# Patient Record
Sex: MALE | Race: BLACK OR AFRICAN AMERICAN | NOT HISPANIC OR LATINO | Employment: OTHER | URBAN - METROPOLITAN AREA
[De-identification: names, ages, dates, MRNs, and addresses within clinical notes are randomized per-mention and may not be internally consistent; named-entity substitution may affect disease eponyms.]

---

## 2017-10-02 ENCOUNTER — OFFICE VISIT (OUTPATIENT)
Dept: FAMILY MEDICINE | Facility: CLINIC | Age: 62
End: 2017-10-02
Payer: COMMERCIAL

## 2017-10-02 ENCOUNTER — LAB VISIT (OUTPATIENT)
Dept: LAB | Facility: HOSPITAL | Age: 62
End: 2017-10-02
Attending: FAMILY MEDICINE
Payer: COMMERCIAL

## 2017-10-02 VITALS
RESPIRATION RATE: 16 BRPM | HEIGHT: 74 IN | SYSTOLIC BLOOD PRESSURE: 190 MMHG | BODY MASS INDEX: 31.72 KG/M2 | DIASTOLIC BLOOD PRESSURE: 100 MMHG | WEIGHT: 247.13 LBS | OXYGEN SATURATION: 100 % | TEMPERATURE: 98 F

## 2017-10-02 DIAGNOSIS — R97.20 ELEVATED PSA: Primary | ICD-10-CM

## 2017-10-02 DIAGNOSIS — I10 HYPERTENSION, UNSPECIFIED TYPE: ICD-10-CM

## 2017-10-02 DIAGNOSIS — Z00.00 ANNUAL PHYSICAL EXAM: Primary | ICD-10-CM

## 2017-10-02 DIAGNOSIS — Z00.00 ANNUAL PHYSICAL EXAM: ICD-10-CM

## 2017-10-02 LAB
ALBUMIN SERPL BCP-MCNC: 3.8 G/DL
ALP SERPL-CCNC: 103 U/L
ALT SERPL W/O P-5'-P-CCNC: 23 U/L
ANION GAP SERPL CALC-SCNC: 11 MMOL/L
AST SERPL-CCNC: 16 U/L
BASOPHILS # BLD AUTO: 0.02 K/UL
BASOPHILS NFR BLD: 0.3 %
BILIRUB SERPL-MCNC: 0.4 MG/DL
BILIRUB UR QL STRIP: NEGATIVE
BUN SERPL-MCNC: 12 MG/DL
CALCIUM SERPL-MCNC: 10.3 MG/DL
CHLORIDE SERPL-SCNC: 101 MMOL/L
CLARITY UR REFRACT.AUTO: CLEAR
CO2 SERPL-SCNC: 28 MMOL/L
COLOR UR AUTO: YELLOW
COMPLEXED PSA SERPL-MCNC: 7.6 NG/ML
CREAT SERPL-MCNC: 1 MG/DL
DIFFERENTIAL METHOD: ABNORMAL
EOSINOPHIL # BLD AUTO: 0.1 K/UL
EOSINOPHIL NFR BLD: 2.1 %
ERYTHROCYTE [DISTWIDTH] IN BLOOD BY AUTOMATED COUNT: 16.3 %
EST. GFR  (AFRICAN AMERICAN): >60 ML/MIN/1.73 M^2
EST. GFR  (NON AFRICAN AMERICAN): >60 ML/MIN/1.73 M^2
GLUCOSE SERPL-MCNC: 91 MG/DL
GLUCOSE UR QL STRIP: NEGATIVE
HCT VFR BLD AUTO: 41.3 %
HGB BLD-MCNC: 13.4 G/DL
HGB UR QL STRIP: NEGATIVE
KETONES UR QL STRIP: NEGATIVE
LEUKOCYTE ESTERASE UR QL STRIP: NEGATIVE
LYMPHOCYTES # BLD AUTO: 2.1 K/UL
LYMPHOCYTES NFR BLD: 33.2 %
MCH RBC QN AUTO: 26.9 PG
MCHC RBC AUTO-ENTMCNC: 32.4 G/DL
MCV RBC AUTO: 83 FL
MICROSCOPIC COMMENT: NORMAL
MONOCYTES # BLD AUTO: 0.5 K/UL
MONOCYTES NFR BLD: 7.4 %
NEUTROPHILS # BLD AUTO: 3.5 K/UL
NEUTROPHILS NFR BLD: 56.8 %
NITRITE UR QL STRIP: NEGATIVE
PH UR STRIP: 5 [PH] (ref 5–8)
PLATELET # BLD AUTO: 279 K/UL
PMV BLD AUTO: 11.8 FL
POTASSIUM SERPL-SCNC: 3.7 MMOL/L
PROT SERPL-MCNC: 8.6 G/DL
PROT UR QL STRIP: NEGATIVE
RBC # BLD AUTO: 4.99 M/UL
RBC #/AREA URNS AUTO: 0 /HPF (ref 0–4)
SODIUM SERPL-SCNC: 140 MMOL/L
SP GR UR STRIP: 1.02 (ref 1–1.03)
SQUAMOUS #/AREA URNS AUTO: 0 /HPF
TSH SERPL DL<=0.005 MIU/L-ACNC: 2.67 UIU/ML
URN SPEC COLLECT METH UR: NORMAL
UROBILINOGEN UR STRIP-ACNC: NEGATIVE EU/DL
WBC # BLD AUTO: 6.18 K/UL
WBC #/AREA URNS AUTO: 1 /HPF (ref 0–5)

## 2017-10-02 PROCEDURE — 81001 URINALYSIS AUTO W/SCOPE: CPT

## 2017-10-02 PROCEDURE — 99999 PR PBB SHADOW E&M-NEW PATIENT-LVL III: CPT | Mod: PBBFAC,,, | Performed by: FAMILY MEDICINE

## 2017-10-02 PROCEDURE — 84153 ASSAY OF PSA TOTAL: CPT

## 2017-10-02 PROCEDURE — 99386 PREV VISIT NEW AGE 40-64: CPT | Mod: S$GLB,,, | Performed by: FAMILY MEDICINE

## 2017-10-02 PROCEDURE — 80053 COMPREHEN METABOLIC PANEL: CPT

## 2017-10-02 PROCEDURE — 84443 ASSAY THYROID STIM HORMONE: CPT

## 2017-10-02 PROCEDURE — 36415 COLL VENOUS BLD VENIPUNCTURE: CPT | Mod: PO

## 2017-10-02 PROCEDURE — 85025 COMPLETE CBC W/AUTO DIFF WBC: CPT

## 2017-10-02 RX ORDER — LISINOPRIL AND HYDROCHLOROTHIAZIDE 20; 25 MG/1; MG/1
1 TABLET ORAL DAILY
Qty: 30 TABLET | Refills: 0 | Status: SHIPPED | OUTPATIENT
Start: 2017-10-02 | End: 2017-10-29 | Stop reason: SDUPTHER

## 2017-10-02 NOTE — PROGRESS NOTES
Subjective:       Patient ID: Demario Live is a 61 y.o. male.    Chief Complaint: Hypertension      HPI  Mr. Live presents to clinic today for complaints of high blood pressure.   He state he has been off medication for 5 years.   He states he was on Zestoretic at one time, but got off.   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   His last colonoscopy was 12  Years ago.   He states the colonoscopy was within normal range.     He does not exercise currently.   He does not smoke.   He drinks on occasion.   He states he has been eating a lot of baked food.     Review of Systems   Constitutional: Negative for fever.   Respiratory: Negative for cough and shortness of breath.    Cardiovascular: Negative for chest pain.   Gastrointestinal: Negative for abdominal pain, diarrhea and vomiting.   Genitourinary: Negative for dysuria and hematuria.   Neurological: Negative for dizziness and headaches.       Family History   Problem Relation Age of Onset    Stroke Mother     Heart disease Father      Social History     Social History    Marital status:      Spouse name: N/A    Number of children: N/A    Years of education: N/A     Occupational History    Not on file.     Social History Main Topics    Smoking status: Never Smoker    Smokeless tobacco: Never Used    Alcohol use Yes      Comment: social     Drug use: No    Sexual activity: Yes     Partners: Female     Other Topics Concern    Not on file     Social History Narrative    No narrative on file     .soc  There is no problem list on file for this patient.        Objective:     Physical Exam   Constitutional: He is oriented to person, place, and time. He appears well-developed and well-nourished. No distress.   HENT:   Head: Normocephalic and atraumatic.   Right Ear: External ear normal.   Left Ear: External ear normal.   Mouth/Throat: Oropharynx is clear and moist. No oropharyngeal exudate.   Eyes: EOM are  normal. Right eye exhibits no discharge. Left eye exhibits no discharge.   Cardiovascular: Normal rate and regular rhythm.    Pulmonary/Chest: Effort normal and breath sounds normal. No respiratory distress. He has no wheezes.   Abdominal: Soft. Bowel sounds are normal. He exhibits no distension. There is no tenderness. There is no rebound and no guarding.   Musculoskeletal: He exhibits no edema.   Neurological: He is alert and oriented to person, place, and time.   Skin: Skin is warm and dry. He is not diaphoretic. No erythema.   Psychiatric: He has a normal mood and affect.   Vitals reviewed.    Vitals:    10/02/17 1343   BP: (!) 190/100   Resp: 16   Temp: 98.4 °F (36.9 °C)       Assessment/  PLAN     Annual physical exam  -     CBC auto differential; Future; Expected date: 10/02/2017  -     Comprehensive metabolic panel; Future; Expected date: 10/02/2017  -     TSH; Future; Expected date: 10/02/2017  -     Urinalysis  -     PSA, Screening; Future; Expected date: 10/02/2017    Hypertension, unspecified type  -     lisinopril-hydrochlorothiazide (PRINZIDE,ZESTORETIC) 20-25 mg Tab; Take 1 tablet by mouth once daily.  Dispense: 30 tablet; Refill: 0    Other orders  -     Urinalysis Microscopic      Plan as above   rtc 1 month for follow up on htn     Bull Morris MD  [facility]

## 2017-10-02 NOTE — PATIENT INSTRUCTIONS

## 2017-10-02 NOTE — LETTER
October 2, 2017             [facility]  Family Medicine  [address] [place] [address]  Phone: [phone]   October 2, 2017     Patient: Demario Live   YOB: 1955   Date of Visit: 10/2/2017       To Whom it May Concern:    Demario Live was seen in my clinic on 10/2/2017. He may return to work on 10/4/2017.    If you have any questions or concerns, please don't hesitate to call.    Sincerely,         Riki Grimaldo LPN

## 2017-10-05 PROBLEM — I10 HYPERTENSION: Status: ACTIVE | Noted: 2017-10-05

## 2017-10-06 ENCOUNTER — OFFICE VISIT (OUTPATIENT)
Dept: FAMILY MEDICINE | Facility: CLINIC | Age: 62
End: 2017-10-06
Payer: COMMERCIAL

## 2017-10-06 VITALS
TEMPERATURE: 98 F | BODY MASS INDEX: 32.08 KG/M2 | SYSTOLIC BLOOD PRESSURE: 140 MMHG | HEART RATE: 80 BPM | DIASTOLIC BLOOD PRESSURE: 86 MMHG | OXYGEN SATURATION: 99 % | HEIGHT: 73 IN | WEIGHT: 242.06 LBS | RESPIRATION RATE: 16 BRPM

## 2017-10-06 DIAGNOSIS — I10 HYPERTENSION, UNSPECIFIED TYPE: Primary | ICD-10-CM

## 2017-10-06 PROCEDURE — 99999 PR PBB SHADOW E&M-EST. PATIENT-LVL III: CPT | Mod: PBBFAC,,, | Performed by: FAMILY MEDICINE

## 2017-10-06 PROCEDURE — 99214 OFFICE O/P EST MOD 30 MIN: CPT | Mod: S$GLB,,, | Performed by: FAMILY MEDICINE

## 2017-10-06 RX ORDER — AMLODIPINE BESYLATE 5 MG/1
5 TABLET ORAL DAILY
Qty: 30 TABLET | Refills: 0 | Status: SHIPPED | OUTPATIENT
Start: 2017-10-06 | End: 2017-11-01 | Stop reason: SDUPTHER

## 2017-10-06 NOTE — PROGRESS NOTES
Subjective:       Patient ID: Demario Live is a 61 y.o. male.    Chief Complaint: Hypertension      HPI  Mr. Live presents to clinic today for complaints of high blood pressure. 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   He was noted to have 180/100 at the physical.   He states he has been going to [facility] and it is noted be in the 140 range.   He denies any side effects from the medication and states he is feeling great.     Review of Systems   Constitutional: Negative for fever.   Respiratory: Negative for cough and shortness of breath.    Cardiovascular: Negative for chest pain.   Gastrointestinal: Negative for abdominal pain and vomiting.   Neurological: Negative for dizziness and headaches.       Medication List with Changes/Refills   Current Medications    LISINOPRIL-HYDROCHLOROTHIAZIDE (PRINZIDE,ZESTORETIC) 20-25 MG TAB    Take 1 tablet by mouth once daily.       Patient Active Problem List   Diagnosis    Hypertension         Objective:     Physical Exam   Constitutional: He is oriented to person, place, and time. He appears well-developed and well-nourished. No distress.   HENT:   Head: Normocephalic and atraumatic.   Right Ear: External ear normal.   Left Ear: External ear normal.   Eyes: EOM are normal. Right eye exhibits no discharge. Left eye exhibits no discharge.   Cardiovascular: Normal rate and regular rhythm.    Pulmonary/Chest: Effort normal and breath sounds normal. No respiratory distress. He has no wheezes.   Musculoskeletal: He exhibits no edema.   Neurological: He is alert and oriented to person, place, and time.   Skin: Skin is warm and dry. He is not diaphoretic. No erythema.   Psychiatric: He has a normal mood and affect.   Vitals reviewed.    Vitals:    10/06/17 1032   BP: (!) 140/86   Pulse: 80   Resp: 16   Temp: 98.3 °F (36.8 °C)       Assessment/  PLAN     Hypertension, unspecified type  -     amlodipine (NORVASC) 5 MG tablet; Take 1 tablet (5 mg  total) by mouth once daily.  Dispense: 30 tablet; Refill: 0  - continue zestoretic     Plan as above   rtc in a few weeks for follow up on htn     Bull Morris MD  [facility]

## 2017-10-29 DIAGNOSIS — I10 HYPERTENSION, UNSPECIFIED TYPE: ICD-10-CM

## 2017-10-29 RX ORDER — LISINOPRIL AND HYDROCHLOROTHIAZIDE 20; 25 MG/1; MG/1
1 TABLET ORAL DAILY
Qty: 30 TABLET | Refills: 0 | Status: SHIPPED | OUTPATIENT
Start: 2017-10-29 | End: 2017-11-28 | Stop reason: SDUPTHER

## 2017-11-01 DIAGNOSIS — I10 HYPERTENSION, UNSPECIFIED TYPE: ICD-10-CM

## 2017-11-01 RX ORDER — AMLODIPINE BESYLATE 5 MG/1
TABLET ORAL
Qty: 30 TABLET | Refills: 0 | Status: SHIPPED | OUTPATIENT
Start: 2017-11-01 | End: 2017-11-24 | Stop reason: SDUPTHER

## 2017-11-03 ENCOUNTER — LAB VISIT (OUTPATIENT)
Dept: LAB | Facility: HOSPITAL | Age: 62
End: 2017-11-03
Attending: FAMILY MEDICINE
Payer: COMMERCIAL

## 2017-11-03 ENCOUNTER — OFFICE VISIT (OUTPATIENT)
Dept: FAMILY MEDICINE | Facility: CLINIC | Age: 62
End: 2017-11-03
Payer: COMMERCIAL

## 2017-11-03 ENCOUNTER — TELEPHONE (OUTPATIENT)
Dept: UROLOGY | Facility: CLINIC | Age: 62
End: 2017-11-03

## 2017-11-03 VITALS
SYSTOLIC BLOOD PRESSURE: 110 MMHG | HEIGHT: 73 IN | RESPIRATION RATE: 16 BRPM | OXYGEN SATURATION: 98 % | BODY MASS INDEX: 32.17 KG/M2 | DIASTOLIC BLOOD PRESSURE: 70 MMHG | WEIGHT: 242.75 LBS | HEART RATE: 80 BPM

## 2017-11-03 DIAGNOSIS — R09.81 NASAL CONGESTION: ICD-10-CM

## 2017-11-03 DIAGNOSIS — I10 ESSENTIAL HYPERTENSION: Primary | ICD-10-CM

## 2017-11-03 DIAGNOSIS — Z00.00 ANNUAL PHYSICAL EXAM: ICD-10-CM

## 2017-11-03 DIAGNOSIS — Z00.00 HEALTH CARE MAINTENANCE: ICD-10-CM

## 2017-11-03 LAB
CHOLEST SERPL-MCNC: 191 MG/DL
CHOLEST/HDLC SERPL: 5.3 {RATIO}
HDLC SERPL-MCNC: 36 MG/DL
HDLC SERPL: 18.8 %
LDLC SERPL CALC-MCNC: 131.2 MG/DL
NONHDLC SERPL-MCNC: 155 MG/DL
TRIGL SERPL-MCNC: 119 MG/DL

## 2017-11-03 PROCEDURE — 36415 COLL VENOUS BLD VENIPUNCTURE: CPT | Mod: PO

## 2017-11-03 PROCEDURE — 99214 OFFICE O/P EST MOD 30 MIN: CPT | Mod: S$GLB,,, | Performed by: FAMILY MEDICINE

## 2017-11-03 PROCEDURE — 99999 PR PBB SHADOW E&M-EST. PATIENT-LVL III: CPT | Mod: PBBFAC,,, | Performed by: FAMILY MEDICINE

## 2017-11-03 PROCEDURE — 80061 LIPID PANEL: CPT

## 2017-11-03 RX ORDER — AZELASTINE 1 MG/ML
1 SPRAY, METERED NASAL 2 TIMES DAILY
Qty: 30 ML | Refills: 0 | Status: SHIPPED | OUTPATIENT
Start: 2017-11-03 | End: 2017-11-07 | Stop reason: SDUPTHER

## 2017-11-03 NOTE — PROGRESS NOTES
Subjective:       Patient ID: Demario Live is a 61 y.o. male.    Chief Complaint: Follow-up (Hypertension)      HPI   Mr. Live presents to clinic today for follow up on his blood pressure.   He states he has been feeling great since his last visit.   He states his blood pressure is much better and he has been able to work.   He states on average he gets in the 128/75 range.   He does report some sinus issues.     Review of Systems   Constitutional: Negative for fever.   HENT: Positive for congestion. Negative for rhinorrhea and sore throat.    Respiratory: Negative for cough and shortness of breath.    Cardiovascular: Negative for chest pain.   Gastrointestinal: Negative for abdominal pain, blood in stool, diarrhea and vomiting.   Genitourinary: Negative for difficulty urinating, dysuria and hematuria.   Skin: Negative for rash.   Neurological: Negative for dizziness and headaches.       Medication List with Changes/Refills   New Medications    AZELASTINE (ASTELIN) 137 MCG (0.1 %) NASAL SPRAY    1 spray (137 mcg total) by Nasal route 2 (two) times daily.   Current Medications    AMLODIPINE (NORVASC) 5 MG TABLET    TAKE 1 TABLET (5 MG TOTAL) BY MOUTH ONCE DAILY.**NOT COVERED BY INSURANCE**    LISINOPRIL-HYDROCHLOROTHIAZIDE (PRINZIDE,ZESTORETIC) 20-25 MG TAB    TAKE 1 TABLET BY MOUTH ONCE DAILY.       Patient Active Problem List   Diagnosis    Hypertension         Objective:     Physical Exam   Constitutional: He is oriented to person, place, and time. He appears well-developed and well-nourished. No distress.   HENT:   Head: Normocephalic and atraumatic.   Right Ear: External ear normal.   Left Ear: External ear normal.   Eyes: EOM are normal. Right eye exhibits no discharge. Left eye exhibits no discharge.   Cardiovascular: Normal rate and regular rhythm.    Pulmonary/Chest: Effort normal and breath sounds normal. No respiratory distress. He has no wheezes.   Musculoskeletal: He exhibits no edema.   Neurological:  He is alert and oriented to person, place, and time.   Skin: Skin is warm and dry. He is not diaphoretic. No erythema.   Psychiatric: He has a normal mood and affect.   Vitals reviewed.    Vitals:    11/03/17 0837   BP: 110/70   Pulse: 80   Resp: 16       Assessment/  PLAN     Essential hypertension  - continue norvasc, zestoretic     Nasal congestion  -     azelastine (ASTELIN) 137 mcg (0.1 %) nasal spray; 1 spray (137 mcg total) by Nasal route 2 (two) times daily.  Dispense: 30 mL; Refill: 0    Annual physical exam  -     Lipid panel; Future; Expected date: 11/03/2017    Health care maintenance  -     Case request GI: COLONOSCOPY    Plan as above   rtc prn     Bull Morris MD  [facility]

## 2017-11-03 NOTE — PATIENT INSTRUCTIONS

## 2017-11-07 ENCOUNTER — TELEPHONE (OUTPATIENT)
Dept: FAMILY MEDICINE | Facility: CLINIC | Age: 62
End: 2017-11-07

## 2017-11-07 ENCOUNTER — TELEPHONE (OUTPATIENT)
Dept: UROLOGY | Facility: CLINIC | Age: 62
End: 2017-11-07

## 2017-11-07 DIAGNOSIS — R09.81 NASAL CONGESTION: ICD-10-CM

## 2017-11-07 DIAGNOSIS — E78.5 HYPERLIPIDEMIA, UNSPECIFIED HYPERLIPIDEMIA TYPE: Primary | ICD-10-CM

## 2017-11-07 RX ORDER — ATORVASTATIN CALCIUM 10 MG/1
10 TABLET, FILM COATED ORAL DAILY
Qty: 30 TABLET | Refills: 0 | Status: SHIPPED | OUTPATIENT
Start: 2017-11-07 | End: 2018-09-28 | Stop reason: ALTCHOICE

## 2017-11-07 RX ORDER — AZELASTINE 1 MG/ML
1 SPRAY, METERED NASAL 2 TIMES DAILY
Qty: 30 ML | Refills: 0 | Status: SHIPPED | OUTPATIENT
Start: 2017-11-07 | End: 2018-02-08 | Stop reason: ALTCHOICE

## 2017-11-07 RX ORDER — ATORVASTATIN CALCIUM 10 MG/1
10 TABLET, FILM COATED ORAL DAILY
Qty: 30 TABLET | Refills: 0 | Status: SHIPPED | OUTPATIENT
Start: 2017-11-07 | End: 2017-11-07 | Stop reason: SDUPTHER

## 2017-11-07 NOTE — TELEPHONE ENCOUNTER
----- Message from Mandy Krause sent at 11/7/2017 11:21 AM CST -----  Contact: pt  He's returning a missed call, please advise [phone] (home)

## 2017-11-24 DIAGNOSIS — I10 HYPERTENSION, UNSPECIFIED TYPE: ICD-10-CM

## 2017-11-24 RX ORDER — AMLODIPINE BESYLATE 5 MG/1
TABLET ORAL
Qty: 30 TABLET | Refills: 0 | Status: SHIPPED | OUTPATIENT
Start: 2017-11-24 | End: 2017-11-27 | Stop reason: SDUPTHER

## 2017-11-27 DIAGNOSIS — I10 HYPERTENSION, UNSPECIFIED TYPE: ICD-10-CM

## 2017-11-27 RX ORDER — AMLODIPINE BESYLATE 5 MG/1
TABLET ORAL
Qty: 30 TABLET | Refills: 0 | Status: SHIPPED | OUTPATIENT
Start: 2017-11-27 | End: 2018-01-02 | Stop reason: SDUPTHER

## 2017-11-28 DIAGNOSIS — I10 HYPERTENSION, UNSPECIFIED TYPE: ICD-10-CM

## 2017-11-28 RX ORDER — AMLODIPINE BESYLATE 5 MG/1
TABLET ORAL
Qty: 30 TABLET | Refills: 0 | Status: SHIPPED | OUTPATIENT
Start: 2017-11-28 | End: 2018-03-16 | Stop reason: SDUPTHER

## 2017-11-28 RX ORDER — LISINOPRIL AND HYDROCHLOROTHIAZIDE 20; 25 MG/1; MG/1
1 TABLET ORAL DAILY
Qty: 30 TABLET | Refills: 0 | Status: SHIPPED | OUTPATIENT
Start: 2017-11-28 | End: 2017-12-31 | Stop reason: SDUPTHER

## 2017-12-31 DIAGNOSIS — I10 HYPERTENSION, UNSPECIFIED TYPE: ICD-10-CM

## 2018-01-01 RX ORDER — LISINOPRIL AND HYDROCHLOROTHIAZIDE 20; 25 MG/1; MG/1
1 TABLET ORAL DAILY
Qty: 30 TABLET | Refills: 0 | Status: SHIPPED | OUTPATIENT
Start: 2018-01-01 | End: 2018-02-01 | Stop reason: SDUPTHER

## 2018-01-02 ENCOUNTER — LAB VISIT (OUTPATIENT)
Dept: LAB | Facility: HOSPITAL | Age: 63
End: 2018-01-02
Attending: UROLOGY
Payer: COMMERCIAL

## 2018-01-02 ENCOUNTER — OFFICE VISIT (OUTPATIENT)
Dept: UROLOGY | Facility: CLINIC | Age: 63
End: 2018-01-02
Payer: COMMERCIAL

## 2018-01-02 VITALS
SYSTOLIC BLOOD PRESSURE: 140 MMHG | BODY MASS INDEX: 32.87 KG/M2 | DIASTOLIC BLOOD PRESSURE: 88 MMHG | HEART RATE: 80 BPM | HEIGHT: 73 IN | WEIGHT: 248 LBS

## 2018-01-02 DIAGNOSIS — R97.20 ELEVATED PSA: Primary | ICD-10-CM

## 2018-01-02 DIAGNOSIS — R97.20 ELEVATED PSA: ICD-10-CM

## 2018-01-02 LAB
BILIRUB SERPL-MCNC: NORMAL MG/DL
BLOOD URINE, POC: NORMAL
COLOR, POC UA: YELLOW
COMPLEXED PSA SERPL-MCNC: 11.4 NG/ML
GLUCOSE UR QL STRIP: NORMAL
KETONES UR QL STRIP: NORMAL
LEUKOCYTE ESTERASE URINE, POC: NORMAL
NITRITE, POC UA: NORMAL
PH, POC UA: 6
PROTEIN, POC: NORMAL
SPECIFIC GRAVITY, POC UA: 1.01
UROBILINOGEN, POC UA: NORMAL

## 2018-01-02 PROCEDURE — 99244 OFF/OP CNSLTJ NEW/EST MOD 40: CPT | Mod: 25,S$GLB,, | Performed by: UROLOGY

## 2018-01-02 PROCEDURE — 81002 URINALYSIS NONAUTO W/O SCOPE: CPT | Mod: S$GLB,,, | Performed by: UROLOGY

## 2018-01-02 PROCEDURE — 84153 ASSAY OF PSA TOTAL: CPT

## 2018-01-02 PROCEDURE — 36415 COLL VENOUS BLD VENIPUNCTURE: CPT

## 2018-01-02 PROCEDURE — 99999 PR PBB SHADOW E&M-EST. PATIENT-LVL III: CPT | Mod: PBBFAC,,, | Performed by: UROLOGY

## 2018-01-02 RX ORDER — CIPROFLOXACIN 500 MG/1
500 TABLET ORAL EVERY 12 HOURS
Qty: 3 TABLET | Refills: 0 | Status: SHIPPED | OUTPATIENT
Start: 2018-01-02 | End: 2018-01-25

## 2018-01-02 NOTE — LETTER
January 2, 2018      Bull Morris MD  [address]  [facility] [address]           [facility]  [address] [place] [address]  Phone: [phone]  Fax: [phone]          Patient: Demario Live   MR Number: [id number]   YOB: 1955   Date of Visit: 1/2/2018       Dear Dr. Bull Morris:    Thank you for referring Demario Live to me for evaluation. Attached you will find relevant portions of my assessment and plan of care.    If you have questions, please do not hesitate to call me. I look forward to following Demario Live along with you.    Sincerely,    Tomi Gutierrez IV, MD    Enclosure  CC:  No Recipients    If you would like to receive this communication electronically, please contact [email or web address] or [phone] to request more information on [facility] access.    For providers and/or their staff who would like to refer a patient to [facility], please contact us through our one-stop-shop provider referral line, Cullen Escobar, at [phone].    If you feel you have received this communication in error or would no longer like to receive these types of communications, please e-mail [email or web address]

## 2018-01-02 NOTE — PROGRESS NOTES
Chief Complaint: Elevated PSA    HPI:   1/2/18: 61 yo man referred by Dr. Morris for elevated PSA of 7.6; was normal a few years ago.  No abd/pelvic pain and no exac/rel factors.  No hematuria.  No urolithiasis.  No urinary bother.  No  history.  Morning caffeine.    Allergies:  Patient has no known allergies.    Medications:  has a current medication list which includes the following prescription(s): amlodipine, atorvastatin, lisinopril-hydrochlorothiazide, azelastine, and ciprofloxacin hcl.    Review of Systems:  General: No fever, chills, fatigability, or weight loss.  Skin: No rashes, itching, or changes in color or texture of skin.  Chest: Denies CHATTERJEE, cyanosis, wheezing, cough, and sputum production.  Abdomen: Appetite fine. No weight loss. Denies diarrhea, abdominal pain, hematemesis, or blood in stool.  Musculoskeletal: No joint stiffness or swelling. Denies back pain.  : As above.  All other review of systems negative.    PMH:   has a past medical history of Hypertension.    PSH:   has a past surgical history that includes Lipoma resection.    FamHx: family history includes Heart disease in his father; Stroke in his mother.    SocHx:  reports that he has never smoked. He has never used smokeless tobacco. He reports that he drinks alcohol. He reports that he does not use drugs.      Physical Exam:  Vitals:    01/02/18 1526   BP: (!) 140/88   Pulse: 80     General: A&Ox3, no apparent distress, no deformities  Neck: No masses, normal thyroid  Lungs: normal inspiration, no use of accessory muscles  Heart: normal pulse, no arrhythmias  Abdomen: Soft, NT, ND, no masses, no hernias, no hepatosplenomegaly  Lymphatic: Neck and groin nodes negative  Skin: The skin is warm and dry. No jaundice.  Ext: No c/c/e.  : Test desc shellie, no abnormalities of epididymus. Penis normal, with normal penile and scrotal skin. Meatus normal. Normal rectal tone, no hemorrhoids. Prost 40 gm no nodules or masses appreciated. SV not  palpable. Perineum and anus normal.    Labs/Studies:   Urinalysis performed in clinic, summary: UA normal  PSA    10/17: 7.6    Impression/Plan:   1. Reviewed risk of prostate cancer with his PSA and recc biopsy.  Cipro rx and scheduled.  Will recheck PSA and amend plan if indicated.

## 2018-01-25 ENCOUNTER — OFFICE VISIT (OUTPATIENT)
Dept: UROLOGY | Facility: CLINIC | Age: 63
End: 2018-01-25
Payer: COMMERCIAL

## 2018-01-25 VITALS
HEIGHT: 73 IN | WEIGHT: 248 LBS | SYSTOLIC BLOOD PRESSURE: 142 MMHG | BODY MASS INDEX: 32.87 KG/M2 | HEART RATE: 93 BPM | DIASTOLIC BLOOD PRESSURE: 80 MMHG

## 2018-01-25 DIAGNOSIS — R97.20 ELEVATED PSA: Primary | ICD-10-CM

## 2018-01-25 LAB
BILIRUB SERPL-MCNC: NORMAL MG/DL
BLOOD URINE, POC: NORMAL
COLOR, POC UA: NORMAL
GLUCOSE UR QL STRIP: NORMAL
KETONES UR QL STRIP: NORMAL
LEUKOCYTE ESTERASE URINE, POC: NORMAL
NITRITE, POC UA: NORMAL
PH, POC UA: 5
PROTEIN, POC: NORMAL
SPECIFIC GRAVITY, POC UA: 1.02
UROBILINOGEN, POC UA: NORMAL

## 2018-01-25 PROCEDURE — 81002 URINALYSIS NONAUTO W/O SCOPE: CPT | Mod: S$GLB,,, | Performed by: UROLOGY

## 2018-01-25 PROCEDURE — 55700 PR BIOPSY OF PROSTATE,NEEDLE/PUNCH: CPT | Mod: S$GLB,,, | Performed by: UROLOGY

## 2018-01-25 PROCEDURE — 99999 PR PBB SHADOW E&M-EST. PATIENT-LVL III: CPT | Mod: PBBFAC,,, | Performed by: UROLOGY

## 2018-01-25 PROCEDURE — 99499 UNLISTED E&M SERVICE: CPT | Mod: S$GLB,,, | Performed by: UROLOGY

## 2018-01-25 PROCEDURE — 76872 US TRANSRECTAL: CPT | Mod: S$GLB,,, | Performed by: UROLOGY

## 2018-01-25 PROCEDURE — 88305 TISSUE EXAM BY PATHOLOGIST: CPT | Mod: 26,,, | Performed by: PATHOLOGY

## 2018-01-25 PROCEDURE — 76942 ECHO GUIDE FOR BIOPSY: CPT | Mod: 59,S$GLB,, | Performed by: UROLOGY

## 2018-01-25 PROCEDURE — 88305 TISSUE EXAM BY PATHOLOGIST: CPT | Performed by: PATHOLOGY

## 2018-01-25 RX ORDER — CIPROFLOXACIN 500 MG/1
500 TABLET ORAL
Qty: 1 TABLET | Refills: 0 | Status: SHIPPED | OUTPATIENT
Start: 2018-01-25 | End: 2018-02-04

## 2018-01-25 NOTE — PROGRESS NOTES
Chief Complaint: Elevated PSA    HPI:   1/25/18: TRUS/Bx today 49 gm  1/2/18: 61 yo man referred by Dr. Morris for elevated PSA of 7.6; was normal a few years ago.  No abd/pelvic pain and no exac/rel factors.  No hematuria.  No urolithiasis.  No urinary bother.  No  history.  Morning caffeine.    Allergies:  Patient has no known allergies.    Medications:  has a current medication list which includes the following prescription(s): amlodipine, atorvastatin, azelastine, and lisinopril-hydrochlorothiazide.    Review of Systems:  General: No fever, chills, fatigability, or weight loss.  Skin: No rashes, itching, or changes in color or texture of skin.  Chest: Denies CHATTERJEE, cyanosis, wheezing, cough, and sputum production.  Abdomen: Appetite fine. No weight loss. Denies diarrhea, abdominal pain, hematemesis, or blood in stool.  Musculoskeletal: No joint stiffness or swelling. Denies back pain.  : As above.  All other review of systems negative.    PMH:   has a past medical history of Hypertension.    PSH:   has a past surgical history that includes Lipoma resection.    FamHx: family history includes Heart disease in his father; Stroke in his mother.    SocHx:  reports that he has never smoked. He has never used smokeless tobacco. He reports that he drinks alcohol. He reports that he does not use drugs.      Physical Exam:  Vitals:    01/25/18 1552   BP: (!) 142/80   Pulse: 93     General: A&Ox3, no apparent distress, no deformities  Neck: No masses, normal thyroid  Lungs: normal inspiration, no use of accessory muscles  Heart: normal pulse, no arrhythmias  Abdomen: Soft, NT, ND, no masses, no hernias, no hepatosplenomegaly  Lymphatic: Neck and groin nodes negative  Skin: The skin is warm and dry. No jaundice.  Ext: No c/c/e.  : Test desc shellie, no abnormalities of epididymus. Penis normal, with normal penile and scrotal skin. Meatus normal. Normal rectal tone, no hemorrhoids. Prost 40 gm no nodules or masses  appreciated. SV not palpable. Perineum and anus normal.    Labs/Studies:   Urinalysis performed in clinic, summary: UA normal  PSA    10/17: 7.6    1/18: 11.4    Procedure: (1) Transrectal Prostate Biopsy                      (2) Transrectal ultrasound of prostate                     (3) Ultrasound Guidance of Prostate Biopsy needles    Detail: After proper consents were obtained, the patient was prepped and draped in normal fashion in the left lateral decubitus position for TRUS/Bx.  5 ml of lidocaine jelly was instilled in the rectum.  The U/S with rectal probe was used to size the prostate.  A spinal needle was used and 5ml of 1% lidocaine was instilled on the side of the prostate at the base of the seminal vesicles.  Biopsy was then performed using an 18Ga biopsy needle directed at the base, mid and apex bilaterally for a total of 12 cores. Antibiotic prophylaxis was provided using oral ciprofloxacin.    Findings: The prostate is 65W, 38H, and 39L for volume of 49 grams, with no abnl seen.    Impression/Plan:   1. RTC 10d to review results

## 2018-02-01 DIAGNOSIS — I10 HYPERTENSION, UNSPECIFIED TYPE: ICD-10-CM

## 2018-02-01 RX ORDER — LISINOPRIL AND HYDROCHLOROTHIAZIDE 20; 25 MG/1; MG/1
1 TABLET ORAL DAILY
Qty: 30 TABLET | Refills: 0 | Status: SHIPPED | OUTPATIENT
Start: 2018-02-01 | End: 2018-03-07 | Stop reason: SDUPTHER

## 2018-02-08 ENCOUNTER — OFFICE VISIT (OUTPATIENT)
Dept: UROLOGY | Facility: CLINIC | Age: 63
End: 2018-02-08
Payer: COMMERCIAL

## 2018-02-08 VITALS
SYSTOLIC BLOOD PRESSURE: 154 MMHG | HEART RATE: 100 BPM | HEIGHT: 73 IN | BODY MASS INDEX: 32.73 KG/M2 | DIASTOLIC BLOOD PRESSURE: 78 MMHG | WEIGHT: 246.94 LBS

## 2018-02-08 DIAGNOSIS — R97.20 ELEVATED PSA: Primary | ICD-10-CM

## 2018-02-08 LAB
BILIRUB SERPL-MCNC: NORMAL MG/DL
BLOOD URINE, POC: 250
COLOR, POC UA: YELLOW
GLUCOSE UR QL STRIP: NORMAL
KETONES UR QL STRIP: NORMAL
LEUKOCYTE ESTERASE URINE, POC: NORMAL
NITRITE, POC UA: NORMAL
PH, POC UA: 5
PROTEIN, POC: NORMAL
SPECIFIC GRAVITY, POC UA: 1.02
UROBILINOGEN, POC UA: NORMAL

## 2018-02-08 PROCEDURE — 81002 URINALYSIS NONAUTO W/O SCOPE: CPT | Mod: S$GLB,,, | Performed by: UROLOGY

## 2018-02-08 PROCEDURE — 99999 PR PBB SHADOW E&M-EST. PATIENT-LVL II: CPT | Mod: PBBFAC,,, | Performed by: UROLOGY

## 2018-02-08 PROCEDURE — 3008F BODY MASS INDEX DOCD: CPT | Mod: S$GLB,,, | Performed by: UROLOGY

## 2018-02-08 PROCEDURE — 99212 OFFICE O/P EST SF 10 MIN: CPT | Performed by: UROLOGY

## 2018-02-08 PROCEDURE — 99214 OFFICE O/P EST MOD 30 MIN: CPT | Mod: 25,S$GLB,, | Performed by: UROLOGY

## 2018-02-08 NOTE — PROGRESS NOTES
Chief Complaint: Elevated PSA    HPI:   2/8/18: Biopsy normal, no prostate cancer present  1/25/18: TRUS/Bx today 49 gm  1/2/18: 61 yo man referred by Dr. Morris for elevated PSA of 7.6; was normal a few years ago.  No abd/pelvic pain and no exac/rel factors.  No hematuria.  No urolithiasis.  No urinary bother.  No  history.  Morning caffeine.    Allergies:  Patient has no known allergies.    Medications:  has a current medication list which includes the following prescription(s): amlodipine, atorvastatin, and lisinopril-hydrochlorothiazide.    Review of Systems:  General: No fever, chills, fatigability, or weight loss.  Skin: No rashes, itching, or changes in color or texture of skin.  Chest: Denies CHATTERJEE, cyanosis, wheezing, cough, and sputum production.  Abdomen: Appetite fine. No weight loss. Denies diarrhea, abdominal pain, hematemesis, or blood in stool.  Musculoskeletal: No joint stiffness or swelling. Denies back pain.  : As above.  All other review of systems negative.    PMH:   has a past medical history of Hypertension.    PSH:   has a past surgical history that includes Lipoma resection.    FamHx: family history includes Heart disease in his father; Stroke in his mother.    SocHx:  reports that he has never smoked. He has never used smokeless tobacco. He reports that he drinks alcohol. He reports that he does not use drugs.      Physical Exam:  Vitals:    02/08/18 1544   BP: (!) 154/78   Pulse: 100     General: A&Ox3, no apparent distress, no deformities  Neck: No masses, normal thyroid  Lungs: normal inspiration, no use of accessory muscles  Heart: normal pulse, no arrhythmias  Abdomen: Soft, NT, ND  Skin: The skin is warm and dry. No jaundice.  Ext: No c/c/e.  :   1/18: Test desc shellie, no abnormalities of epididymus. Penis normal, with normal penile and scrotal skin. Meatus normal. Normal rectal tone, no hemorrhoids. Prost 40 gm no nodules or masses appreciated. SV not palpable. Perineum and anus  normal.    Labs/Studies:   Urinalysis performed in clinic, summary: UA normal  PSA    10/17: 7.6    1/18: 11.4    Impression/Plan:   1. PSA elevated, no cancer found.  Will monitor PSA 6/12 with RTC 12 mo.  If further rise will get MRI as next step.

## 2018-02-09 ENCOUNTER — PATIENT OUTREACH (OUTPATIENT)
Dept: ADMINISTRATIVE | Facility: HOSPITAL | Age: 63
End: 2018-02-09

## 2018-03-07 DIAGNOSIS — I10 HYPERTENSION, UNSPECIFIED TYPE: ICD-10-CM

## 2018-03-07 RX ORDER — LISINOPRIL AND HYDROCHLOROTHIAZIDE 20; 25 MG/1; MG/1
1 TABLET ORAL DAILY
Qty: 30 TABLET | Refills: 0 | Status: SHIPPED | OUTPATIENT
Start: 2018-03-07 | End: 2018-04-08 | Stop reason: SDUPTHER

## 2018-03-16 DIAGNOSIS — I10 HYPERTENSION, UNSPECIFIED TYPE: ICD-10-CM

## 2018-03-16 RX ORDER — AMLODIPINE BESYLATE 5 MG/1
TABLET ORAL
Qty: 30 TABLET | Refills: 0 | Status: SHIPPED | OUTPATIENT
Start: 2018-03-16 | End: 2018-05-12 | Stop reason: SDUPTHER

## 2018-04-08 DIAGNOSIS — I10 HYPERTENSION, UNSPECIFIED TYPE: ICD-10-CM

## 2018-04-09 RX ORDER — LISINOPRIL AND HYDROCHLOROTHIAZIDE 20; 25 MG/1; MG/1
1 TABLET ORAL DAILY
Qty: 30 TABLET | Refills: 0 | Status: SHIPPED | OUTPATIENT
Start: 2018-04-09 | End: 2018-05-12 | Stop reason: SDUPTHER

## 2018-05-12 ENCOUNTER — TELEPHONE (OUTPATIENT)
Dept: FAMILY MEDICINE | Facility: CLINIC | Age: 63
End: 2018-05-12

## 2018-05-12 DIAGNOSIS — I10 HYPERTENSION, UNSPECIFIED TYPE: ICD-10-CM

## 2018-05-13 RX ORDER — LISINOPRIL AND HYDROCHLOROTHIAZIDE 20; 25 MG/1; MG/1
1 TABLET ORAL DAILY
Qty: 30 TABLET | Refills: 0 | Status: SHIPPED | OUTPATIENT
Start: 2018-05-13 | End: 2018-06-11 | Stop reason: SDUPTHER

## 2018-05-13 RX ORDER — AMLODIPINE BESYLATE 5 MG/1
TABLET ORAL
Qty: 30 TABLET | Refills: 0 | Status: SHIPPED | OUTPATIENT
Start: 2018-05-13 | End: 2018-06-11 | Stop reason: SDUPTHER

## 2018-06-11 ENCOUNTER — TELEPHONE (OUTPATIENT)
Dept: FAMILY MEDICINE | Facility: CLINIC | Age: 63
End: 2018-06-11

## 2018-06-11 DIAGNOSIS — I10 HYPERTENSION, UNSPECIFIED TYPE: ICD-10-CM

## 2018-06-11 RX ORDER — LISINOPRIL AND HYDROCHLOROTHIAZIDE 20; 25 MG/1; MG/1
1 TABLET ORAL DAILY
Qty: 30 TABLET | Refills: 0 | Status: SHIPPED | OUTPATIENT
Start: 2018-06-11 | End: 2018-07-16 | Stop reason: SDUPTHER

## 2018-06-11 RX ORDER — AMLODIPINE BESYLATE 5 MG/1
TABLET ORAL
Qty: 30 TABLET | Refills: 0 | Status: SHIPPED | OUTPATIENT
Start: 2018-06-11 | End: 2018-07-16 | Stop reason: SDUPTHER

## 2018-06-11 NOTE — TELEPHONE ENCOUNTER
Advised pt that he will no longer be able to get a refill on his medicine until he has been seen. States that he fighting insurance on the previous appointments he had. Offered billing number, says he will work on it.

## 2018-06-11 NOTE — TELEPHONE ENCOUNTER
----- Message from Laquita Mclaughlin sent at 6/11/2018  1:59 PM CDT -----  Contact: Pt  Please give pt a call at ..[phone] (home) he was returning the nurse call.

## 2018-06-11 NOTE — TELEPHONE ENCOUNTER
Will mail a letter (Only option) besides a not to the pharmacy. Last time he requested refills, I advised that the number on file is invalid.

## 2018-07-16 DIAGNOSIS — I10 HYPERTENSION, UNSPECIFIED TYPE: ICD-10-CM

## 2018-07-16 RX ORDER — AMLODIPINE BESYLATE 5 MG/1
TABLET ORAL
Qty: 30 TABLET | Refills: 0 | Status: SHIPPED | OUTPATIENT
Start: 2018-07-16 | End: 2018-08-19 | Stop reason: SDUPTHER

## 2018-07-16 RX ORDER — LISINOPRIL AND HYDROCHLOROTHIAZIDE 20; 25 MG/1; MG/1
TABLET ORAL
Qty: 30 TABLET | Refills: 0 | Status: SHIPPED | OUTPATIENT
Start: 2018-07-16 | End: 2018-08-19 | Stop reason: SDUPTHER

## 2018-08-19 DIAGNOSIS — I10 HYPERTENSION, UNSPECIFIED TYPE: ICD-10-CM

## 2018-08-19 RX ORDER — AMLODIPINE BESYLATE 5 MG/1
TABLET ORAL
Qty: 30 TABLET | Refills: 0 | Status: SHIPPED | OUTPATIENT
Start: 2018-08-19 | End: 2018-09-28

## 2018-08-19 RX ORDER — LISINOPRIL AND HYDROCHLOROTHIAZIDE 20; 25 MG/1; MG/1
TABLET ORAL
Qty: 30 TABLET | Refills: 0 | Status: SHIPPED | OUTPATIENT
Start: 2018-08-19 | End: 2018-09-28

## 2018-09-28 ENCOUNTER — OFFICE VISIT (OUTPATIENT)
Dept: FAMILY MEDICINE | Facility: CLINIC | Age: 63
End: 2018-09-28
Payer: COMMERCIAL

## 2018-09-28 VITALS
TEMPERATURE: 99 F | HEART RATE: 102 BPM | WEIGHT: 241.38 LBS | SYSTOLIC BLOOD PRESSURE: 168 MMHG | BODY MASS INDEX: 31.99 KG/M2 | DIASTOLIC BLOOD PRESSURE: 89 MMHG | OXYGEN SATURATION: 96 % | HEIGHT: 73 IN

## 2018-09-28 DIAGNOSIS — Z12.11 SCREENING FOR COLON CANCER: ICD-10-CM

## 2018-09-28 DIAGNOSIS — Z00.00 ENCOUNTER FOR WELLNESS EXAMINATION IN ADULT: Primary | ICD-10-CM

## 2018-09-28 DIAGNOSIS — E78.5 HYPERLIPIDEMIA, UNSPECIFIED HYPERLIPIDEMIA TYPE: ICD-10-CM

## 2018-09-28 DIAGNOSIS — Z11.59 ENCOUNTER FOR HEPATITIS C SCREENING TEST FOR LOW RISK PATIENT: ICD-10-CM

## 2018-09-28 DIAGNOSIS — I10 ESSENTIAL HYPERTENSION: ICD-10-CM

## 2018-09-28 DIAGNOSIS — I10 HYPERTENSION, UNSPECIFIED TYPE: ICD-10-CM

## 2018-09-28 PROCEDURE — 99999 PR PBB SHADOW E&M-EST. PATIENT-LVL III: CPT | Mod: PBBFAC,,, | Performed by: FAMILY MEDICINE

## 2018-09-28 PROCEDURE — 99213 OFFICE O/P EST LOW 20 MIN: CPT | Mod: 25,S$GLB,, | Performed by: FAMILY MEDICINE

## 2018-09-28 PROCEDURE — 99396 PREV VISIT EST AGE 40-64: CPT | Mod: 25,S$GLB,, | Performed by: FAMILY MEDICINE

## 2018-09-28 RX ORDER — ASPIRIN 81 MG/1
81 TABLET ORAL DAILY
COMMUNITY
End: 2023-06-30

## 2018-09-28 RX ORDER — LISINOPRIL AND HYDROCHLOROTHIAZIDE 20; 25 MG/1; MG/1
1 TABLET ORAL DAILY
Qty: 30 TABLET | Refills: 11 | Status: SHIPPED | OUTPATIENT
Start: 2018-09-28 | End: 2018-10-12 | Stop reason: SDUPTHER

## 2018-09-28 RX ORDER — AMLODIPINE BESYLATE 5 MG/1
5 TABLET ORAL DAILY
Qty: 30 TABLET | Refills: 11 | Status: SHIPPED | OUTPATIENT
Start: 2018-09-28 | End: 2018-10-12 | Stop reason: DRUGHIGH

## 2018-09-28 NOTE — PROGRESS NOTES
Subjective:     Patient ID: Demario Live is a 62 y.o. male.    Chief Complaint: Annual Exam and Medication Refill    HPI     Here for annual exam and HTN medication refills    HTN   --out of medications for the last month   --was out of town and couldn't refill medications  --has been watching his salt in his diet   --norvasc 5 and lisinopril-HCTZ 20-25   --notes blood pressure at home has been running 160-170 systolic   --no hx of heart disease in self  --denies any headaches, sob, changes in vision or chest pain     HLD   --supposed to be on lipitor, but not taking because of miscommunication    --will recheck lipid panel and add medication as needed   --hx of increased cholesterol   --reviewed most recent lipid panel myself     Patient also taking daily baby aspirin     Father  of Heart failure at 56  Mother  of CVA 61     Patient never smoked   Drinks alcohol occasionally - one beer per 2 months   No illicit drugs     pt being followed by urology for PSA - biopsy normal per pt at beginning of the year   Needs colonoscopy     Surgeries  30 years ago lipoma removal on back     Hospitalization - none      with 3 boys     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     Usually exercises a few times a week - walking but has been difficult lately due to his traveling     Past Medical History:   Diagnosis Date    Hypertension      Past Surgical History:   Procedure Laterality Date    LIPOMA RESECTION       Family History   Problem Relation Age of Onset    Stroke Mother     Heart disease Father      Social History     Socioeconomic History    Marital status:      Spouse name: Not on file    Number of children: 3    Years of education: Not on file    Highest education level: Not on file   Social Needs    Financial resource strain: Not on file    Food insecurity - worry: Not on file    Food insecurity - inability: Not on file    Transportation needs - medical: Not on file    Transportation needs  - non-medical: Not on file   Occupational History    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 If you have any questions, ask your nurse or doctor.               CHANGE how you take these medications    amLODIPine 5 MG tablet  Commonly known as:  NORVASC  Take 1 tablet (5 mg total) by mouth once daily.  What changed:  See the new instructions.  Changed by:  Sheeba Gonsales MD     lisinopril-hydrochlorothiazide 20-25 mg Tab  Commonly known as:  PRINREALZESTORETIC  Take 1 tablet by mouth once daily.  What changed:  See the new instructions.  Changed by:  Sheeba Gonsales MD        CONTINUE taking these medications    aspirin 81 MG EC tablet  Commonly known as:  ECOTRIN        STOP taking these medications    atorvastatin 10 MG tablet  Commonly known as:  LIPITOR  Stopped by:  Sheeba Gonsales MD           Where to Get Your Medications      These medications were sent to [facility]/pharmacy #[id number] - [facility] - [address] AT [facility]  [address], [place] [address]    Phone:  [phone]   · amLODIPine 5 MG tablet  · lisinopril-hydrochlorothiazide 20-25 mg Tab       Review of patient's allergies indicates:  No Known Allergies  Review of Systems   Constitutional: Negative for fever and weight loss.   HENT: Negative for ear pain and hearing loss.    Eyes: Negative for blurred vision and pain.   Respiratory: Negative for cough and shortness of breath.    Cardiovascular: Negative  for chest pain and leg swelling.   Gastrointestinal: Negative for abdominal pain, constipation, diarrhea, nausea and vomiting.   Genitourinary: Negative for dysuria.   Skin: Negative for rash.   Neurological: Negative for headaches.   Psychiatric/Behavioral: Negative for depression.       Objective:   Body mass index is 31.85 kg/m².  Vitals:    09/28/18 0740   BP: (!) 168/89   Pulse: 102   Temp: 98.9 °F (37.2 °C)           Physical Exam   Constitutional: He is oriented to person, place, and time. He appears well-developed and well-nourished. No distress.   HENT:   Head: Normocephalic and atraumatic.   Right Ear: External ear normal.   Left Ear: External ear normal.   Nose: Nose normal.   Mouth/Throat: Oropharynx is clear and moist.   Eyes: Conjunctivae and EOM are normal. Pupils are equal, round, and reactive to light.   Neck: Normal range of motion. Neck supple.   Cardiovascular: Normal rate, regular rhythm, normal heart sounds and intact distal pulses.   No murmur heard.  Pulmonary/Chest: Effort normal and breath sounds normal. No respiratory distress. He has no wheezes. He has no rales. He exhibits no tenderness.   Abdominal: Soft. Bowel sounds are normal. He exhibits no distension. There is no tenderness.   Musculoskeletal: He exhibits no edema.   Lymphadenopathy:     He has no cervical adenopathy.   Neurological: He is alert and oriented to person, place, and time.   Skin: Skin is warm and dry.   Psychiatric: He has a normal mood and affect.         Assessment and Plan      Essential hypertension  --will restart medications  --advised low salt diet, increased physical activity and weight loss   --review handouts  --rtc in 2 weeks for follow up     Hyperlipidemia, unspecified hyperlipidemia type   --will review lipid panel and decide what dosage of statin patient should be on   -     Lipid panel; Future; Expected date: 09/28/2018    Encounter for wellness examination in adult  -     CBC auto differential;  Future; Expected date: 09/28/2018  -     Comprehensive metabolic panel; Future; Expected date: 09/28/2018  -     Lipid panel; Future; Expected date: 09/28/2018  -     POCT urinalysis, dipstick or tablet reag    Other orders  -     amLODIPine (NORVASC) 5 MG tablet; Take 1 tablet (5 mg total) by mouth once daily.  Dispense: 30 tablet; Refill: 11  -     lisinopril-hydrochlorothiazide (PRINZIDE,ZESTORETIC) 20-25 mg Tab; Take 1 tablet by mouth once daily.  Dispense: 30 tablet; Refill: 11    *patient declined flu vaccine and shingles vaccine  *will screen for hep c  *pt referred for colonoscopy  *pt being followed by urology for PSA - biopsy normal per pt at beginning of the year     Follow-up in about 2 weeks (around 10/12/2018) for HTN .

## 2018-10-03 ENCOUNTER — DOCUMENTATION ONLY (OUTPATIENT)
Dept: ENDOSCOPY | Facility: HOSPITAL | Age: 63
End: 2018-10-03

## 2018-10-12 ENCOUNTER — LAB VISIT (OUTPATIENT)
Dept: LAB | Facility: HOSPITAL | Age: 63
End: 2018-10-12
Attending: FAMILY MEDICINE
Payer: COMMERCIAL

## 2018-10-12 ENCOUNTER — OFFICE VISIT (OUTPATIENT)
Dept: FAMILY MEDICINE | Facility: CLINIC | Age: 63
End: 2018-10-12
Payer: COMMERCIAL

## 2018-10-12 VITALS
HEIGHT: 73 IN | SYSTOLIC BLOOD PRESSURE: 140 MMHG | DIASTOLIC BLOOD PRESSURE: 82 MMHG | HEART RATE: 84 BPM | OXYGEN SATURATION: 98 % | WEIGHT: 239.19 LBS | BODY MASS INDEX: 31.7 KG/M2 | TEMPERATURE: 98 F

## 2018-10-12 DIAGNOSIS — Z00.00 ENCOUNTER FOR WELLNESS EXAMINATION IN ADULT: ICD-10-CM

## 2018-10-12 DIAGNOSIS — Z11.59 ENCOUNTER FOR HEPATITIS C SCREENING TEST FOR LOW RISK PATIENT: ICD-10-CM

## 2018-10-12 DIAGNOSIS — E78.5 HYPERLIPIDEMIA, UNSPECIFIED HYPERLIPIDEMIA TYPE: ICD-10-CM

## 2018-10-12 DIAGNOSIS — I10 ESSENTIAL HYPERTENSION: ICD-10-CM

## 2018-10-12 DIAGNOSIS — I10 HYPERTENSION, UNSPECIFIED TYPE: Primary | ICD-10-CM

## 2018-10-12 LAB
ALBUMIN SERPL BCP-MCNC: 3.5 G/DL
ALP SERPL-CCNC: 99 U/L
ALT SERPL W/O P-5'-P-CCNC: 18 U/L
ANION GAP SERPL CALC-SCNC: 8 MMOL/L
AST SERPL-CCNC: 16 U/L
BASOPHILS # BLD AUTO: 0.03 K/UL
BASOPHILS NFR BLD: 0.4 %
BILIRUB SERPL-MCNC: 0.3 MG/DL
BUN SERPL-MCNC: 17 MG/DL
CALCIUM SERPL-MCNC: 9.8 MG/DL
CHLORIDE SERPL-SCNC: 103 MMOL/L
CHOLEST SERPL-MCNC: 202 MG/DL
CHOLEST/HDLC SERPL: 4.6 {RATIO}
CO2 SERPL-SCNC: 28 MMOL/L
CREAT SERPL-MCNC: 1 MG/DL
DIFFERENTIAL METHOD: ABNORMAL
EOSINOPHIL # BLD AUTO: 0.2 K/UL
EOSINOPHIL NFR BLD: 2.4 %
ERYTHROCYTE [DISTWIDTH] IN BLOOD BY AUTOMATED COUNT: 15.7 %
EST. GFR  (AFRICAN AMERICAN): >60 ML/MIN/1.73 M^2
EST. GFR  (NON AFRICAN AMERICAN): >60 ML/MIN/1.73 M^2
GLUCOSE SERPL-MCNC: 90 MG/DL
HCT VFR BLD AUTO: 39.3 %
HDLC SERPL-MCNC: 44 MG/DL
HDLC SERPL: 21.8 %
HGB BLD-MCNC: 12.2 G/DL
IMM GRANULOCYTES # BLD AUTO: 0.03 K/UL
IMM GRANULOCYTES NFR BLD AUTO: 0.4 %
LDLC SERPL CALC-MCNC: 138.4 MG/DL
LYMPHOCYTES # BLD AUTO: 2.1 K/UL
LYMPHOCYTES NFR BLD: 28.2 %
MCH RBC QN AUTO: 26.1 PG
MCHC RBC AUTO-ENTMCNC: 31 G/DL
MCV RBC AUTO: 84 FL
MONOCYTES # BLD AUTO: 0.6 K/UL
MONOCYTES NFR BLD: 8.1 %
NEUTROPHILS # BLD AUTO: 4.6 K/UL
NEUTROPHILS NFR BLD: 60.5 %
NONHDLC SERPL-MCNC: 158 MG/DL
NRBC BLD-RTO: 0 /100 WBC
PLATELET # BLD AUTO: 346 K/UL
PMV BLD AUTO: 12.3 FL
POTASSIUM SERPL-SCNC: 4.3 MMOL/L
PROT SERPL-MCNC: 7.7 G/DL
RBC # BLD AUTO: 4.68 M/UL
SODIUM SERPL-SCNC: 139 MMOL/L
TRIGL SERPL-MCNC: 98 MG/DL
WBC # BLD AUTO: 7.54 K/UL

## 2018-10-12 PROCEDURE — 86803 HEPATITIS C AB TEST: CPT

## 2018-10-12 PROCEDURE — 80061 LIPID PANEL: CPT

## 2018-10-12 PROCEDURE — 99999 PR PBB SHADOW E&M-EST. PATIENT-LVL III: CPT | Mod: PBBFAC,,, | Performed by: FAMILY MEDICINE

## 2018-10-12 PROCEDURE — 85025 COMPLETE CBC W/AUTO DIFF WBC: CPT

## 2018-10-12 PROCEDURE — 36415 COLL VENOUS BLD VENIPUNCTURE: CPT | Mod: PO

## 2018-10-12 PROCEDURE — 80053 COMPREHEN METABOLIC PANEL: CPT

## 2018-10-12 PROCEDURE — 99213 OFFICE O/P EST LOW 20 MIN: CPT | Mod: S$GLB,,, | Performed by: FAMILY MEDICINE

## 2018-10-12 RX ORDER — LISINOPRIL AND HYDROCHLOROTHIAZIDE 20; 25 MG/1; MG/1
1 TABLET ORAL DAILY
Qty: 30 TABLET | Refills: 11 | Status: SHIPPED | OUTPATIENT
Start: 2018-10-12 | End: 2018-11-09 | Stop reason: SDUPTHER

## 2018-10-12 RX ORDER — AMLODIPINE BESYLATE 10 MG/1
10 TABLET ORAL DAILY
Qty: 30 TABLET | Refills: 11 | Status: SHIPPED | OUTPATIENT
Start: 2018-10-12 | End: 2018-11-01 | Stop reason: SDUPTHER

## 2018-10-12 NOTE — PATIENT INSTRUCTIONS

## 2018-10-12 NOTE — PROGRESS NOTES
Subjective:     Patient ID: Demario Live is a 62 y.o. male.    Chief Complaint: Follow-up (blood pressure)    HPI    Patient here for blood pressure follow up and lab work     HTN   --on norvasc 5 and lisinopril-hctz 20-25 combo   --blood pressure better than last visit when patient was not taking medications  --pt has lost weight, trying to eat better and is motivated to start working out   --home blood pressure readings - systolic in 140's/80's   --denies any chest pain, sob, headaches, or changes in vision   --pt still needs lab work, fasting today     Past Medical History:   Diagnosis Date    Hypertension      Past Surgical History:   Procedure Laterality Date    LIPOMA RESECTION       Family History   Problem Relation Age of Onset    Stroke Mother     Heart disease Father      Social History     Socioeconomic History    Marital status:      Spouse name: Not on file    Number of children: 3    Years of education: Not on file    Highest education level: Not on file   Social Needs    Financial resource strain: Not on file    Food insecurity - worry: Not on file    Food insecurity - inability: Not on file    Transportation needs - medical: Not on file    Transportation needs - non-medical: Not on file   Occupational History    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 If you have any questions, ask your nurse or doctor.               CHANGE how you take these medications    amLODIPine 10 MG tablet  Commonly  known as:  NORVASC  Take 1 tablet (10 mg total) by mouth once daily.  What changed:    · medication strength  · how much to take  Changed by:  Sheeba Gonsales MD        CONTINUE taking these medications    aspirin 81 MG EC tablet  Commonly known as:  ECOTRIN     lisinopril-hydrochlorothiazide 20-25 mg Tab  Commonly known as:  PRINZIDE,ZESTORETIC  Take 1 tablet by mouth once daily.           Where to Get Your Medications      These medications were sent to [facility]/pharmacy #[id number] - [place] - [address] AT [facility]  [address], [place] [address]    Phone:  [phone]   · amLODIPine 10 MG tablet  · lisinopril-hydrochlorothiazide 20-25 mg Tab       Review of patient's allergies indicates:  No Known Allergies  Review of Systems   Eyes: Negative for blurred vision and pain.   Respiratory: Negative for shortness of breath.    Cardiovascular: Negative for chest pain and leg swelling.   Genitourinary: Negative for dysuria.   Neurological: Negative for headaches.       Objective:   Body mass index is 31.56 kg/m².  Vitals:    10/12/18 0740   BP: (!) 140/82   Pulse: 84   Temp: 98 °F (36.7 °C)           Physical Exam   Constitutional: He is oriented to person, place, and time. He appears well-developed and well-nourished. No distress.   HENT:   Head: Normocephalic.   Cardiovascular: Normal rate, regular rhythm and normal heart sounds.   No murmur heard.  Pulmonary/Chest: Effort normal and breath sounds normal. No respiratory distress. He has no wheezes.   Abdominal: Soft. Bowel sounds are normal. There is no tenderness.   Neurological: He is alert and oriented to person, place, and time.   Skin: Skin is warm and dry. No erythema.   Psychiatric: He has a normal mood and affect.         Assessment and Plan      Hypertension, unspecified type    Other orders  -     lisinopril-hydrochlorothiazide (PRINZIDE,ZESTORETIC) 20-25 mg Tab; Take 1 tablet by mouth once daily.   Dispense: 30 tablet; Refill: 11  -     amLODIPine (NORVASC) 10 MG tablet; Take 1 tablet (10 mg total) by mouth once daily.  Dispense: 30 tablet; Refill: 11    will increase norvasc to 10 mg   Continue to lose weight   Advised of signs of low blood pressure as well   Pt to take blood pressure at home and bring in at next visit   Decrease salt in diet     Follow-up in about 4 weeks (around 11/9/2018) for HTN .

## 2018-10-15 ENCOUNTER — TELEPHONE (OUTPATIENT)
Dept: FAMILY MEDICINE | Facility: CLINIC | Age: 63
End: 2018-10-15

## 2018-10-15 LAB — HCV AB SERPL QL IA: NEGATIVE

## 2018-10-15 RX ORDER — ATORVASTATIN CALCIUM 10 MG/1
10 TABLET, FILM COATED ORAL DAILY
Qty: 30 TABLET | Refills: 11 | Status: SHIPPED | OUTPATIENT
Start: 2018-10-15 | End: 2019-02-15 | Stop reason: SDUPTHER

## 2018-10-19 ENCOUNTER — TELEPHONE (OUTPATIENT)
Dept: FAMILY MEDICINE | Facility: CLINIC | Age: 63
End: 2018-10-19

## 2018-10-19 NOTE — TELEPHONE ENCOUNTER
Spoke with patient about results   Will start statin   Pt is mildly anemic, will get colonoscopy. He is not able to get it until after the first of the year due to insurance changes.

## 2018-10-25 DIAGNOSIS — I10 HYPERTENSION, UNSPECIFIED TYPE: ICD-10-CM

## 2018-10-25 RX ORDER — AMLODIPINE BESYLATE 5 MG/1
TABLET ORAL
Qty: 30 TABLET | Refills: 0 | OUTPATIENT
Start: 2018-10-25

## 2018-10-25 RX ORDER — LISINOPRIL AND HYDROCHLOROTHIAZIDE 20; 25 MG/1; MG/1
TABLET ORAL
Qty: 30 TABLET | Refills: 0 | OUTPATIENT
Start: 2018-10-25

## 2018-11-02 RX ORDER — AMLODIPINE BESYLATE 10 MG/1
10 TABLET ORAL DAILY
Qty: 90 TABLET | Refills: 1 | Status: SHIPPED | OUTPATIENT
Start: 2018-11-02 | End: 2018-11-09 | Stop reason: SDUPTHER

## 2018-11-02 RX ORDER — AMLODIPINE BESYLATE 10 MG/1
10 TABLET ORAL DAILY
Qty: 90 TABLET | Refills: 1 | Status: SHIPPED | OUTPATIENT
Start: 2018-11-02 | End: 2018-11-02 | Stop reason: SDUPTHER

## 2018-11-02 NOTE — TELEPHONE ENCOUNTER
----- Message from Sheeba Gonsales MD sent at 11/2/2018 11:22 AM CDT -----  Can you call the pharmacy for this patient and see if he has the refill for the norvasc 10mg. I tried to send it twice electronically but it doesn't seem to be going through.

## 2018-11-09 ENCOUNTER — OFFICE VISIT (OUTPATIENT)
Dept: FAMILY MEDICINE | Facility: CLINIC | Age: 63
End: 2018-11-09
Payer: COMMERCIAL

## 2018-11-09 VITALS
HEIGHT: 73 IN | SYSTOLIC BLOOD PRESSURE: 130 MMHG | TEMPERATURE: 98 F | DIASTOLIC BLOOD PRESSURE: 81 MMHG | WEIGHT: 240.5 LBS | BODY MASS INDEX: 31.87 KG/M2 | HEART RATE: 98 BPM | OXYGEN SATURATION: 98 %

## 2018-11-09 DIAGNOSIS — N52.9 ERECTILE DYSFUNCTION, UNSPECIFIED ERECTILE DYSFUNCTION TYPE: ICD-10-CM

## 2018-11-09 DIAGNOSIS — E78.5 HYPERLIPIDEMIA, UNSPECIFIED HYPERLIPIDEMIA TYPE: ICD-10-CM

## 2018-11-09 DIAGNOSIS — I10 ESSENTIAL HYPERTENSION: Primary | ICD-10-CM

## 2018-11-09 PROCEDURE — 99999 PR PBB SHADOW E&M-EST. PATIENT-LVL III: CPT | Mod: PBBFAC,,, | Performed by: FAMILY MEDICINE

## 2018-11-09 PROCEDURE — 99214 OFFICE O/P EST MOD 30 MIN: CPT | Mod: S$GLB,,, | Performed by: FAMILY MEDICINE

## 2018-11-09 RX ORDER — LISINOPRIL AND HYDROCHLOROTHIAZIDE 20; 25 MG/1; MG/1
1 TABLET ORAL DAILY
Qty: 30 TABLET | Refills: 11 | Status: SHIPPED | OUTPATIENT
Start: 2018-11-09 | End: 2019-02-15 | Stop reason: SDUPTHER

## 2018-11-09 RX ORDER — AMLODIPINE BESYLATE 10 MG/1
10 TABLET ORAL DAILY
Qty: 90 TABLET | Refills: 1 | Status: SHIPPED | OUTPATIENT
Start: 2018-11-09 | End: 2019-02-15 | Stop reason: SDUPTHER

## 2018-11-09 NOTE — PROGRESS NOTES
Subjective:     Patient ID: Demario Live is a 62 y.o. male.    Chief Complaint: Follow-up (blood pressure)    HPI     Patient here for follow up of blood pressure and starting new medications for HLD     HTN   --increased norvasc to 10 mg at last visit   --patient also taking lisinopril-hctz  --blood pressure at goal today   --he is watching the salt in his diet and trying to lose weight     HLD   --patient started on statin at last visit   --patient taking daily  --tolerating well     ED  --patient notes that he has been having issues with desire and getting erections for the past year  --he does see a urologist and has had biopsies of his prostate in the past - but everything came back benign   --he does have a wife that understands and wanted to just mention it to me   --it does cause him some concern and he understands it could be due to some medications he is on   --does not have a hx of low t     Patient due for a colonoscopy but will wait until the beginning of the year to get this scheduled.     Past Medical History:   Diagnosis Date    Hypertension      Past Surgical History:   Procedure Laterality Date    LIPOMA RESECTION       Family History   Problem Relation Age of Onset    Stroke Mother     Heart disease Father      Social History     Socioeconomic History    Marital status:      Spouse name: Not on file    Number of children: 3    Years of education: Not on file    Highest education level: Not on file   Social Needs    Financial resource strain: Not on file    Food insecurity - worry: Not on file    Food insecurity - inability: Not on file    Transportation needs - medical: Not on file    Transportation needs - non-medical: Not on file   Occupational History    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 If you have any questions, ask your nurse or doctor.               CONTINUE taking these medications    amLODIPine 10 MG tablet  Commonly known as:  NORVASC  Take 1 tablet (10 mg total) by mouth once daily.     aspirin 81 MG EC tablet  Commonly known as:  ECOTRIN     atorvastatin 10 MG tablet  Commonly known as:  LIPITOR  Take 1 tablet (10 mg total) by mouth once daily.     lisinopril-hydrochlorothiazide 20-25 mg Tab  Commonly known as:  PRINZIDE,ZESTORETIC  Take 1 tablet by mouth once daily.           Where to Get Your Medications      These medications were sent to [facility]/pharmacy #[id number] - [facility] - [address] [facility]  [address], [place] [address]    Phone:  [phone]   · amLODIPine 10 MG tablet  · lisinopril-hydrochlorothiazide 20-25 mg Tab       Review of patient's allergies indicates:  No Known Allergies  Review of Systems   Constitutional: Negative for fever.   HENT: Negative for ear pain.    Eyes: Negative for blurred vision and pain.   Respiratory: Negative for cough and shortness of breath.    Cardiovascular: Negative for chest pain and leg swelling.   Gastrointestinal: Negative for abdominal pain, constipation, diarrhea, nausea and vomiting.   Genitourinary: Negative for dysuria.   Skin: Negative for rash.       Objective:   Body mass index is 31.73 kg/m².  Vitals:    11/09/18 0741   BP: 130/81   Pulse: 98   Temp: 98.2 °F (36.8 °C)           Physical Exam   Constitutional: He is oriented to person, place, and time. He appears well-developed and well-nourished. No distress.   HENT:   Head: Normocephalic.   Cardiovascular: Normal rate,  regular rhythm and normal heart sounds.   No murmur heard.  Pulmonary/Chest: Effort normal and breath sounds normal. No respiratory distress. He has no wheezes.   Abdominal: Soft. Bowel sounds are normal. There is no tenderness.   Neurological: He is alert and oriented to person, place, and time.   Skin: Skin is warm and dry.   Psychiatric: He has a normal mood and affect.   Nursing note and vitals reviewed.        Assessment and Plan      Essential hypertension   --at goal today, continue with low salt diet and weight loss    --will refill medications     Hyperlipidemia, unspecified hyperlipidemia type   --doing ok on medicine, will continue, low fat/cholesterol diet     Erectile dysfunction, unspecified erectile dysfunction type   --advised patient that issues could be related to blood pressure medication, recent prostate issues or possible low testosterone    --I offered to screen for low T today, but patient would like to think about it and decide over the next 3 months   --he does not want to change any of his blood pressure medications at this time as he feels good that his bp is at goal     Other orders  -     amLODIPine (NORVASC) 10 MG tablet; Take 1 tablet (10 mg total) by mouth once daily.  Dispense: 90 tablet; Refill: 1  -     lisinopril-hydrochlorothiazide (PRINZIDE,ZESTORETIC) 20-25 mg Tab; Take 1 tablet by mouth once daily.  Dispense: 30 tablet; Refill: 11        Follow-up in about 3 months (around 2/9/2019) for HTN and HLD .

## 2019-02-15 ENCOUNTER — OFFICE VISIT (OUTPATIENT)
Dept: FAMILY MEDICINE | Facility: CLINIC | Age: 64
End: 2019-02-15
Payer: COMMERCIAL

## 2019-02-15 ENCOUNTER — LAB VISIT (OUTPATIENT)
Dept: LAB | Facility: HOSPITAL | Age: 64
End: 2019-02-15
Attending: FAMILY MEDICINE
Payer: COMMERCIAL

## 2019-02-15 VITALS
HEIGHT: 73 IN | OXYGEN SATURATION: 98 % | WEIGHT: 239.88 LBS | DIASTOLIC BLOOD PRESSURE: 75 MMHG | HEART RATE: 82 BPM | TEMPERATURE: 98 F | BODY MASS INDEX: 31.79 KG/M2 | SYSTOLIC BLOOD PRESSURE: 127 MMHG

## 2019-02-15 DIAGNOSIS — N52.9 ERECTILE DYSFUNCTION, UNSPECIFIED ERECTILE DYSFUNCTION TYPE: ICD-10-CM

## 2019-02-15 DIAGNOSIS — E78.5 HYPERLIPIDEMIA, UNSPECIFIED HYPERLIPIDEMIA TYPE: ICD-10-CM

## 2019-02-15 DIAGNOSIS — I10 HYPERTENSION, UNSPECIFIED TYPE: Primary | ICD-10-CM

## 2019-02-15 DIAGNOSIS — R68.82 LOW LIBIDO: ICD-10-CM

## 2019-02-15 PROCEDURE — 99999 PR PBB SHADOW E&M-EST. PATIENT-LVL III: ICD-10-PCS | Mod: PBBFAC,,, | Performed by: FAMILY MEDICINE

## 2019-02-15 PROCEDURE — 93000 EKG 12-LEAD: ICD-10-PCS | Mod: S$GLB,,, | Performed by: INTERNAL MEDICINE

## 2019-02-15 PROCEDURE — 93005 ELECTROCARDIOGRAM TRACING: CPT | Mod: S$GLB,,, | Performed by: FAMILY MEDICINE

## 2019-02-15 PROCEDURE — 36415 COLL VENOUS BLD VENIPUNCTURE: CPT | Mod: PO

## 2019-02-15 PROCEDURE — 93000 ELECTROCARDIOGRAM COMPLETE: CPT | Mod: S$GLB,,, | Performed by: INTERNAL MEDICINE

## 2019-02-15 PROCEDURE — 99214 PR OFFICE/OUTPT VISIT, EST, LEVL IV, 30-39 MIN: ICD-10-PCS | Mod: S$GLB,,, | Performed by: FAMILY MEDICINE

## 2019-02-15 PROCEDURE — 99999 PR PBB SHADOW E&M-EST. PATIENT-LVL III: CPT | Mod: PBBFAC,,, | Performed by: FAMILY MEDICINE

## 2019-02-15 PROCEDURE — 93005 EKG 12-LEAD: ICD-10-PCS | Mod: S$GLB,,, | Performed by: FAMILY MEDICINE

## 2019-02-15 PROCEDURE — 99214 OFFICE O/P EST MOD 30 MIN: CPT | Mod: S$GLB,,, | Performed by: FAMILY MEDICINE

## 2019-02-15 PROCEDURE — 82040 ASSAY OF SERUM ALBUMIN: CPT

## 2019-02-15 RX ORDER — SILDENAFIL CITRATE 20 MG/1
20 TABLET ORAL 3 TIMES DAILY
Qty: 30 TABLET | Refills: 2 | Status: SHIPPED | OUTPATIENT
Start: 2019-02-15 | End: 2019-08-16 | Stop reason: SDUPTHER

## 2019-02-15 RX ORDER — SODIUM, POTASSIUM,MAG SULFATES 17.5-3.13G
1 SOLUTION, RECONSTITUTED, ORAL ORAL DAILY
Qty: 1 KIT | Refills: 0 | Status: CANCELLED | OUTPATIENT
Start: 2019-02-15 | End: 2019-02-17

## 2019-02-15 RX ORDER — AMLODIPINE BESYLATE 10 MG/1
10 TABLET ORAL DAILY
Qty: 90 TABLET | Refills: 1 | Status: SHIPPED | OUTPATIENT
Start: 2019-02-15 | End: 2019-08-16 | Stop reason: SDUPTHER

## 2019-02-15 RX ORDER — ATORVASTATIN CALCIUM 10 MG/1
10 TABLET, FILM COATED ORAL DAILY
Qty: 90 TABLET | Refills: 1 | Status: SHIPPED | OUTPATIENT
Start: 2019-02-15 | End: 2019-08-16 | Stop reason: SDUPTHER

## 2019-02-15 RX ORDER — LISINOPRIL AND HYDROCHLOROTHIAZIDE 20; 25 MG/1; MG/1
1 TABLET ORAL DAILY
Qty: 90 TABLET | Refills: 1 | Status: SHIPPED | OUTPATIENT
Start: 2019-02-15 | End: 2019-08-16 | Stop reason: SDUPTHER

## 2019-02-15 NOTE — PROGRESS NOTES
Subjective:     Patient ID: Demario Live is a 63 y.o. male.    Chief Complaint: Follow-up    HPI    Patient here for follow up of blood pressure and HLD and screening colonoscopy     HTN   --blood pressure controlled   --eating better and watching salt in diet   --no chest pain, sob, no changes in vision, no headaches    HLD   --watching cholesterol in diet   --taking Lipitor daily     ED  --notes it is becoming more of an issue now  --would like something to help with this   --has taken viagra in the past but doesn't have any at this time  --unable to get an erection any more  --has prostate surgery and biopsies 1 year ago, on 3 different blood pressure medications as well, does not have DM  --denies chest pain at rest or during exertion, no hx of MI or CVA,     Patient may be going out of town for 3 months - wants to hold off screening colonoscopy for now     Past Medical History:   Diagnosis Date    HLD (hyperlipidemia) 2/15/2019    Hypertension      Past Surgical History:   Procedure Laterality Date    LIPOMA RESECTION       Family History   Problem Relation Age of Onset    Stroke Mother     Heart disease Father      Social History     Socioeconomic History    Marital status:      Spouse name: Not on file    Number of children: 3    Years of education: Not on file    Highest education level: Not on file   Social Needs    Financial resource strain: Not on file    Food insecurity - worry: Not on file    Food insecurity - inability: Not on file    Transportation needs - medical: Not on file    Transportation needs - non-medical: Not on file   Occupational History    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   Current Medications    ASPIRIN (ECOTRIN) 81 MG EC TABLET    Take 81 mg by mouth once daily.   Changed and/or Refilled Medications    Modified Medication Previous Medication    AMLODIPINE (NORVASC) 10 MG TABLET amLODIPine (NORVASC) 10 MG tablet       Take 1 tablet (10 mg total) by mouth once daily.    Take 1 tablet (10 mg total) by mouth once daily.    ATORVASTATIN (LIPITOR) 10 MG TABLET atorvastatin (LIPITOR) 10 MG tablet       Take 1 tablet (10 mg total) by mouth once daily.    Take 1 tablet (10 mg total) by mouth once daily.    LISINOPRIL-HYDROCHLOROTHIAZIDE (PRINZIDE,ZESTORETIC) 20-25 MG TAB lisinopril-hydrochlorothiazide (PRINZIDE,ZESTORETIC) 20-25 mg Tab       Take 1 tablet by mouth once daily.    Take 1 tablet by mouth once daily.     Review of patient's allergies indicates:  No Known Allergies  Review of Systems   Constitutional: Negative for chills, diaphoresis and fever.   Eyes: Negative for blurred vision.   Respiratory: Negative for shortness of breath.    Cardiovascular: Negative for chest pain, palpitations and leg swelling.   Gastrointestinal: Negative for abdominal pain, constipation and diarrhea.   Genitourinary: Negative for dysuria, frequency, hematuria and urgency.        Erectile dysfunction   Neurological: Negative for headaches.       Objective:   Body mass index is 31.65 kg/m².  Vitals:    02/15/19 0746   BP: 127/75   Pulse:    Temp:            Physical Exam   Constitutional: He is oriented to person, place, and time. He appears well-developed and well-nourished. No distress.   HENT:   Head: Normocephalic.   Eyes: Conjunctivae are normal.   Cardiovascular: Normal rate, regular rhythm and normal heart  sounds.   No murmur heard.  Pulmonary/Chest: Effort normal and breath sounds normal. No respiratory distress. He has no wheezes.   Abdominal: Soft. Bowel sounds are normal. There is no tenderness.   Musculoskeletal: He exhibits no edema.   Neurological: He is alert and oriented to person, place, and time.   Skin: Skin is warm and dry.   Psychiatric: He has a normal mood and affect.   Nursing note and vitals reviewed.        Assessment and Plan      Hypertension, unspecified type   --at goal, continue with current medical therapy    --salt restriction and weight loss  -     IN OFFICE EKG 12-LEAD (to [facility])    Hyperlipidemia, unspecified hyperlipidemia type   --c/w statin, low cholesterol/low fat diet     Erectile dysfunction, unspecified erectile dysfunction type   --will start slidenafil - advised patient to try half a pill first and increase as needed (max 2 pills at one time). Take one hour prior to activity    --if experience any chest pain or other side effects explained to patient - go to ER - he understands and agrees with plan   --missed apt with urology last week, will make him one.   -     Testosterone Panel; Future; Expected date: 02/15/2019  -     IN OFFICE EKG 12-LEAD (to [facility])    Low libido  -     Testosterone Panel; Future; Expected date: 02/15/2019  -     IN OFFICE EKG 12-LEAD (to [place])    Other orders  -     amLODIPine (NORVASC) 10 MG tablet; Take 1 tablet (10 mg total) by mouth once daily.  Dispense: 90 tablet; Refill: 1  -     atorvastatin (LIPITOR) 10 MG tablet; Take 1 tablet (10 mg total) by mouth once daily.  Dispense: 90 tablet; Refill: 1  -     lisinopril-hydrochlorothiazide (PRINZIDE,ZESTORETIC) 20-25 mg Tab; Take 1 tablet by mouth once daily.  Dispense: 90 tablet; Refill: 1  -     sildenafil (REVATIO) 20 mg Tab; Take 1 tablet (20 mg total) by mouth 3 (three) times daily.  Dispense: 30 tablet; Refill: 2               Follow-up in about 6 months (around 8/15/2019).

## 2019-02-18 ENCOUNTER — TELEPHONE (OUTPATIENT)
Dept: FAMILY MEDICINE | Facility: CLINIC | Age: 64
End: 2019-02-18

## 2019-02-18 NOTE — TELEPHONE ENCOUNTER
----- Message from Lucy Hernandezite sent at 2/18/2019  1:25 PM CST -----  Contact: Pt   Pt called and stated he has questions regarding a sildenafil medication he was prescribed.  He can be reached at [phone].    Thanks,  TF     .

## 2019-02-18 NOTE — TELEPHONE ENCOUNTER
Pt states that the Rx sidlenafil says take it 3x daily pt states that you told him to take it once daily. Pt wants to check and se how you want him to take it

## 2019-02-18 NOTE — TELEPHONE ENCOUNTER
"Tell him only take it as needed (not daily) only when he thinks he will "need it". I prescribed it that way to give him more pills per prescription. "

## 2019-02-20 LAB
ALBUMIN SERPL-MCNC: 4.1 G/DL (ref 3.6–5.1)
SHBG SERPL-SCNC: 26 NMOL/L (ref 22–77)
TESTOST FREE SERPL-MCNC: 48.2 PG/ML (ref 46–224)
TESTOST SERPL-MCNC: 306 NG/DL (ref 250–1100)
TESTOSTERONE.FREE+WB SERPL-MCNC: 90.8 NG/DL (ref 110–575)

## 2019-02-21 ENCOUNTER — TELEPHONE (OUTPATIENT)
Dept: FAMILY MEDICINE | Facility: CLINIC | Age: 64
End: 2019-02-21

## 2019-08-16 ENCOUNTER — TELEPHONE (OUTPATIENT)
Dept: FAMILY MEDICINE | Facility: CLINIC | Age: 64
End: 2019-08-16

## 2019-08-16 ENCOUNTER — OFFICE VISIT (OUTPATIENT)
Dept: FAMILY MEDICINE | Facility: CLINIC | Age: 64
End: 2019-08-16
Payer: COMMERCIAL

## 2019-08-16 ENCOUNTER — LAB VISIT (OUTPATIENT)
Dept: LAB | Facility: HOSPITAL | Age: 64
End: 2019-08-16
Payer: COMMERCIAL

## 2019-08-16 VITALS
BODY MASS INDEX: 31.7 KG/M2 | WEIGHT: 239.19 LBS | DIASTOLIC BLOOD PRESSURE: 67 MMHG | OXYGEN SATURATION: 96 % | HEART RATE: 88 BPM | TEMPERATURE: 99 F | HEIGHT: 73 IN | SYSTOLIC BLOOD PRESSURE: 135 MMHG

## 2019-08-16 DIAGNOSIS — I10 HYPERTENSION, UNSPECIFIED TYPE: ICD-10-CM

## 2019-08-16 DIAGNOSIS — N52.9 ERECTILE DYSFUNCTION, UNSPECIFIED ERECTILE DYSFUNCTION TYPE: ICD-10-CM

## 2019-08-16 DIAGNOSIS — E78.5 HYPERLIPIDEMIA, UNSPECIFIED HYPERLIPIDEMIA TYPE: ICD-10-CM

## 2019-08-16 DIAGNOSIS — R22.1 NECK SWELLING: ICD-10-CM

## 2019-08-16 DIAGNOSIS — Z00.00 ENCOUNTER FOR WELLNESS EXAMINATION: Primary | ICD-10-CM

## 2019-08-16 DIAGNOSIS — Z12.5 SCREENING FOR PROSTATE CANCER: ICD-10-CM

## 2019-08-16 DIAGNOSIS — Z00.00 ENCOUNTER FOR WELLNESS EXAMINATION: ICD-10-CM

## 2019-08-16 DIAGNOSIS — R22.1 LUMP IN NECK: ICD-10-CM

## 2019-08-16 DIAGNOSIS — Z12.11 SCREENING FOR COLON CANCER: ICD-10-CM

## 2019-08-16 LAB
ALBUMIN SERPL BCP-MCNC: 3.8 G/DL (ref 3.5–5.2)
ALP SERPL-CCNC: 102 U/L (ref 55–135)
ALT SERPL W/O P-5'-P-CCNC: 31 U/L (ref 10–44)
ANION GAP SERPL CALC-SCNC: 9 MMOL/L (ref 8–16)
AST SERPL-CCNC: 19 U/L (ref 10–40)
BASOPHILS # BLD AUTO: 0.03 K/UL (ref 0–0.2)
BASOPHILS NFR BLD: 0.4 % (ref 0–1.9)
BILIRUB SERPL-MCNC: 0.4 MG/DL (ref 0.1–1)
BUN SERPL-MCNC: 17 MG/DL (ref 8–23)
CALCIUM SERPL-MCNC: 10.2 MG/DL (ref 8.7–10.5)
CHLORIDE SERPL-SCNC: 103 MMOL/L (ref 95–110)
CHOLEST SERPL-MCNC: 151 MG/DL (ref 120–199)
CHOLEST/HDLC SERPL: 3.8 {RATIO} (ref 2–5)
CO2 SERPL-SCNC: 27 MMOL/L (ref 23–29)
COMPLEXED PSA SERPL-MCNC: 11.7 NG/ML (ref 0–4)
CREAT SERPL-MCNC: 1.2 MG/DL (ref 0.5–1.4)
DIFFERENTIAL METHOD: ABNORMAL
EOSINOPHIL # BLD AUTO: 0.1 K/UL (ref 0–0.5)
EOSINOPHIL NFR BLD: 1.2 % (ref 0–8)
ERYTHROCYTE [DISTWIDTH] IN BLOOD BY AUTOMATED COUNT: 16.7 % (ref 11.5–14.5)
EST. GFR  (AFRICAN AMERICAN): >60 ML/MIN/1.73 M^2
EST. GFR  (NON AFRICAN AMERICAN): >60 ML/MIN/1.73 M^2
ESTIMATED AVG GLUCOSE: 120 MG/DL (ref 68–131)
GLUCOSE SERPL-MCNC: 96 MG/DL (ref 70–110)
HBA1C MFR BLD HPLC: 5.8 % (ref 4–5.6)
HCT VFR BLD AUTO: 43.5 % (ref 40–54)
HDLC SERPL-MCNC: 40 MG/DL (ref 40–75)
HDLC SERPL: 26.5 % (ref 20–50)
HGB BLD-MCNC: 13.1 G/DL (ref 14–18)
IMM GRANULOCYTES # BLD AUTO: 0.03 K/UL (ref 0–0.04)
IMM GRANULOCYTES NFR BLD AUTO: 0.4 % (ref 0–0.5)
LDLC SERPL CALC-MCNC: 89.2 MG/DL (ref 63–159)
LYMPHOCYTES # BLD AUTO: 2.3 K/UL (ref 1–4.8)
LYMPHOCYTES NFR BLD: 30.1 % (ref 18–48)
MCH RBC QN AUTO: 26.8 PG (ref 27–31)
MCHC RBC AUTO-ENTMCNC: 30.1 G/DL (ref 32–36)
MCV RBC AUTO: 89 FL (ref 82–98)
MONOCYTES # BLD AUTO: 0.7 K/UL (ref 0.3–1)
MONOCYTES NFR BLD: 8.9 % (ref 4–15)
NEUTROPHILS # BLD AUTO: 4.5 K/UL (ref 1.8–7.7)
NEUTROPHILS NFR BLD: 59 % (ref 38–73)
NONHDLC SERPL-MCNC: 111 MG/DL
NRBC BLD-RTO: 0 /100 WBC
PLATELET # BLD AUTO: 260 K/UL (ref 150–350)
PMV BLD AUTO: 13.5 FL (ref 9.2–12.9)
POTASSIUM SERPL-SCNC: 3.9 MMOL/L (ref 3.5–5.1)
PROT SERPL-MCNC: 7.8 G/DL (ref 6–8.4)
RBC # BLD AUTO: 4.88 M/UL (ref 4.6–6.2)
SODIUM SERPL-SCNC: 139 MMOL/L (ref 136–145)
TRIGL SERPL-MCNC: 109 MG/DL (ref 30–150)
WBC # BLD AUTO: 7.54 K/UL (ref 3.9–12.7)

## 2019-08-16 PROCEDURE — 80061 LIPID PANEL: CPT

## 2019-08-16 PROCEDURE — 84153 ASSAY OF PSA TOTAL: CPT

## 2019-08-16 PROCEDURE — 99999 PR PBB SHADOW E&M-EST. PATIENT-LVL III: CPT | Mod: PBBFAC,,, | Performed by: FAMILY MEDICINE

## 2019-08-16 PROCEDURE — 85025 COMPLETE CBC W/AUTO DIFF WBC: CPT

## 2019-08-16 PROCEDURE — 80053 COMPREHEN METABOLIC PANEL: CPT

## 2019-08-16 PROCEDURE — 99396 PR PREVENTIVE VISIT,EST,40-64: ICD-10-PCS | Mod: S$GLB,,, | Performed by: FAMILY MEDICINE

## 2019-08-16 PROCEDURE — 99396 PREV VISIT EST AGE 40-64: CPT | Mod: S$GLB,,, | Performed by: FAMILY MEDICINE

## 2019-08-16 PROCEDURE — 99999 PR PBB SHADOW E&M-EST. PATIENT-LVL III: ICD-10-PCS | Mod: PBBFAC,,, | Performed by: FAMILY MEDICINE

## 2019-08-16 PROCEDURE — 36415 COLL VENOUS BLD VENIPUNCTURE: CPT | Mod: PO

## 2019-08-16 PROCEDURE — 83036 HEMOGLOBIN GLYCOSYLATED A1C: CPT

## 2019-08-16 RX ORDER — SODIUM, POTASSIUM,MAG SULFATES 17.5-3.13G
1 SOLUTION, RECONSTITUTED, ORAL ORAL DAILY
Qty: 1 KIT | Refills: 0 | Status: SHIPPED | OUTPATIENT
Start: 2019-08-16 | End: 2019-08-18

## 2019-08-16 RX ORDER — LISINOPRIL AND HYDROCHLOROTHIAZIDE 20; 25 MG/1; MG/1
1 TABLET ORAL DAILY
Qty: 90 TABLET | Refills: 1 | Status: SHIPPED | OUTPATIENT
Start: 2019-08-16 | End: 2020-06-19

## 2019-08-16 RX ORDER — SILDENAFIL CITRATE 20 MG/1
20 TABLET ORAL 3 TIMES DAILY
Qty: 60 TABLET | Refills: 5 | Status: SHIPPED | OUTPATIENT
Start: 2019-08-16 | End: 2020-02-17

## 2019-08-16 RX ORDER — ATORVASTATIN CALCIUM 10 MG/1
10 TABLET, FILM COATED ORAL DAILY
Qty: 90 TABLET | Refills: 1 | Status: SHIPPED | OUTPATIENT
Start: 2019-08-16 | End: 2021-01-26 | Stop reason: SDUPTHER

## 2019-08-16 RX ORDER — AMLODIPINE BESYLATE 10 MG/1
10 TABLET ORAL DAILY
Qty: 90 TABLET | Refills: 1 | Status: SHIPPED | OUTPATIENT
Start: 2019-08-16 | End: 2020-06-22

## 2019-08-16 NOTE — PROGRESS NOTES
Subjective:      Patient ID: Demario Live is a 63 y.o. male.    Chief Complaint: Follow-up    HPI    Patient here today for wellness exam     HTN - controlled - norvasc and lisinopril/hctz - kept his weight off that he lost a few months ago   HLD - needs repeat lipid function panel - lipitor   ED - sildenafil - taking one pill isn't enough - would like to increase     Patient with no complaints       Past Medical History:   Diagnosis Date    HLD (hyperlipidemia) 2/15/2019    Hypertension        Past Surgical History:   Procedure Laterality Date    LIPOMA RESECTION         Family History   Problem Relation Age of Onset    Stroke Mother     Heart disease Father        Social History     Socioeconomic History    Marital status:      Spouse name: Not on file    Number of children: 3    Years of education: Not on file    Highest education level: Not on file   Occupational History    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   Social Needs    Financial resource strain: Not on file    Food insecurity:     Worry: Not on file     Inability: Not on file    Transportation needs:     Medical: Not on file     Non-medical: Not on file   Tobacco Use    Smoking status: Never Smoker    Smokeless tobacco: Never Used   Substance and Sexual Activity    Alcohol use: Yes     Comment: 1 beer every 3-6 months    Drug use: No    Sexual activity: Yes     Partners: Female   Lifestyle    Physical activity:     Days per week: Not on file     Minutes per session: Not on file    Stress: Not on file   Relationships    Social connections:     Talks on phone: Not on file     Gets together: Not on file     Attends Christian service: Not on file     Active member of club or organization: Not on file     Attends meetings of clubs or organizations: Not on file     Relationship status: Not on file   Other Topics Concern    Not on file   Social History Narrative    Not on file       Health Maintenance Topics with due status:  Not Due       Topic Last Completion Date    Lipid Panel 10/12/2018       Medication List with Changes/Refills   New Medications    SODIUM,POTASSIUM,MAG SULFATES (SUPREP BOWEL PREP KIT) 17.5-3.13-1.6 GRAM SOLR    Take 177 mLs by mouth once daily. for 2 days   Current Medications    ASPIRIN (ECOTRIN) 81 MG EC TABLET    Take 81 mg by mouth once daily.   Changed and/or Refilled Medications    Modified Medication Previous Medication    AMLODIPINE (NORVASC) 10 MG TABLET amLODIPine (NORVASC) 10 MG tablet       Take 1 tablet (10 mg total) by mouth once daily.    Take 1 tablet (10 mg total) by mouth once daily.    ATORVASTATIN (LIPITOR) 10 MG TABLET atorvastatin (LIPITOR) 10 MG tablet       Take 1 tablet (10 mg total) by mouth once daily.    Take 1 tablet (10 mg total) by mouth once daily.    LISINOPRIL-HYDROCHLOROTHIAZIDE (PRINZIDE,ZESTORETIC) 20-25 MG TAB lisinopril-hydrochlorothiazide (PRINZIDE,ZESTORETIC) 20-25 mg Tab       Take 1 tablet by mouth once daily.    Take 1 tablet by mouth once daily.    SILDENAFIL (REVATIO) 20 MG TAB sildenafil (REVATIO) 20 mg Tab       Take 1 tablet (20 mg total) by mouth 3 (three) times daily.    Take 1 tablet (20 mg total) by mouth 3 (three) times daily.       Review of patient's allergies indicates:  No Known Allergies    Review of Systems   Constitutional: Negative for chills, fever, malaise/fatigue and weight loss.   HENT: Negative for congestion.    Eyes: Negative for blurred vision.   Respiratory: Negative for shortness of breath.    Cardiovascular: Negative for chest pain, palpitations and leg swelling.   Gastrointestinal: Negative for abdominal pain, constipation and diarrhea.   Genitourinary: Negative for dysuria.        ED     Musculoskeletal: Negative for myalgias.   Skin: Negative for rash.   Neurological: Negative for headaches.       Objective:     Vitals:    08/16/19 0737   BP: 135/67   Pulse: 88   Temp: 98.5 °F (36.9 °C)     Body mass index is 31.56 kg/m².    Physical Exam    Constitutional: He is oriented to person, place, and time. He appears well-developed and well-nourished. No distress.   HENT:   Head: Normocephalic and atraumatic.   Right Ear: External ear normal.   Left Ear: External ear normal.   Nose: Nose normal.   Mouth/Throat: Oropharynx is clear and moist.   Eyes: Pupils are equal, round, and reactive to light. Conjunctivae and EOM are normal.   Neck: No tracheal tenderness present. No thyromegaly present.       Enlarged nodule in left side of neck   Patient denies tenderness - notes it does swell and then go back to normal x a few months    Cardiovascular: Normal rate, regular rhythm, normal heart sounds and intact distal pulses.   No murmur heard.  Pulmonary/Chest: Effort normal and breath sounds normal. No respiratory distress. He has no wheezes. He has no rales. He exhibits no tenderness.   Abdominal: Soft. Bowel sounds are normal. He exhibits no distension. There is no tenderness.   Musculoskeletal: He exhibits no edema.   Lymphadenopathy:     He has no cervical adenopathy.   Neurological: He is alert and oriented to person, place, and time.   Skin: Skin is warm and dry.   Psychiatric: He has a normal mood and affect.   Nursing note and vitals reviewed.      Assessment and Plan:     Encounter for wellness examination  Patient advised to continue weight loss - increase physical activity   -     Case request GI: COLONOSCOPY  -     sodium,potassium,mag sulfates (SUPREP BOWEL PREP KIT) 17.5-3.13-1.6 gram SolR; Take 177 mLs by mouth once daily. for 2 days  Dispense: 1 kit; Refill: 0  -     CBC auto differential; Future; Expected date: 08/16/2019  -     Comprehensive metabolic panel; Future; Expected date: 08/16/2019  -     Lipid panel; Future; Expected date: 08/16/2019    Hypertension, unspecified type  Blood pressure controlled at today's visit   Continue with current medications   Ambulatory logs of blood pressure readings recommended   DASH diet, weight loss, exercise      Hyperlipidemia, unspecified hyperlipidemia type  Will get lipid panel today   Will refill atorvastatin     Erectile Dysfunction  Increased sildenafil - 40 mg as needed     Swelling of neck/lump in neck   Will get ultrasound   No hx of lymphoma in the family     Screening for colon cancer  -     Case request GI: COLONOSCOPY  -     sodium,potassium,mag sulfates (SUPREP BOWEL PREP KIT) 17.5-3.13-1.6 gram SolR; Take 177 mLs by mouth once daily. for 2 days  Dispense: 1 kit; Refill: 0    Screening for prostate cancer  -     PSA, Screening; Future; Expected date: 08/16/2019    Other orders  -     lisinopril-hydrochlorothiazide (PRINZIDE,ZESTORETIC) 20-25 mg Tab; Take 1 tablet by mouth once daily.  Dispense: 90 tablet; Refill: 1  -     amLODIPine (NORVASC) 10 MG tablet; Take 1 tablet (10 mg total) by mouth once daily.  Dispense: 90 tablet; Refill: 1  -     atorvastatin (LIPITOR) 10 MG tablet; Take 1 tablet (10 mg total) by mouth once daily.  Dispense: 90 tablet; Refill: 1  -     sildenafil (REVATIO) 20 mg Tab; Take 1 tablet (20 mg total) by mouth 3 (three) times daily.  Dispense: 60 tablet; Refill: 5      Follow up in about 6 months (around 2/16/2020).

## 2019-08-16 NOTE — TELEPHONE ENCOUNTER
----- Message from Mary Zelaya sent at 8/16/2019  2:49 PM CDT -----  Contact: pt  Please call pt @ [phone], pt was in clinic this morning, pt states he have some questions for nurse.

## 2019-09-05 ENCOUNTER — TELEPHONE (OUTPATIENT)
Dept: RADIOLOGY | Facility: HOSPITAL | Age: 64
End: 2019-09-05

## 2019-09-06 ENCOUNTER — TELEPHONE (OUTPATIENT)
Dept: FAMILY MEDICINE | Facility: CLINIC | Age: 64
End: 2019-09-06

## 2019-09-06 ENCOUNTER — HOSPITAL ENCOUNTER (OUTPATIENT)
Dept: RADIOLOGY | Facility: HOSPITAL | Age: 64
Discharge: HOME | End: 2019-09-06
Attending: FAMILY MEDICINE
Payer: COMMERCIAL

## 2019-09-06 DIAGNOSIS — R22.1 NECK MASS: ICD-10-CM

## 2019-09-06 DIAGNOSIS — R22.1 NECK SWELLING: ICD-10-CM

## 2019-09-06 DIAGNOSIS — R22.1 LUMP IN NECK: ICD-10-CM

## 2019-09-06 DIAGNOSIS — R59.9 SWOLLEN LYMPH NODES: ICD-10-CM

## 2019-09-06 DIAGNOSIS — R22.1 NECK SWELLING: Primary | ICD-10-CM

## 2019-09-06 PROCEDURE — 76536 US EXAM OF HEAD AND NECK: CPT | Mod: TC

## 2019-09-06 PROCEDURE — 76536 US EXAM OF HEAD AND NECK: CPT | Mod: 26,,, | Performed by: RADIOLOGY

## 2019-09-06 PROCEDURE — 76536 US SOFT TISSUE HEAD NECK THYROID: ICD-10-PCS | Mod: 26,,, | Performed by: RADIOLOGY

## 2019-09-06 NOTE — TELEPHONE ENCOUNTER
Spoke with patient about results   Will refer to ENT   Will get CT of neck scheduled  Answered all questions

## 2019-09-09 NOTE — TELEPHONE ENCOUNTER
Phoned pt to inform of CT and ENT appt as scheduled. No answer. Voicemail left with appt information.

## 2019-09-11 ENCOUNTER — TELEPHONE (OUTPATIENT)
Dept: FAMILY MEDICINE | Facility: CLINIC | Age: 64
End: 2019-09-11

## 2019-09-11 NOTE — TELEPHONE ENCOUNTER
----- Message from Francesca Shaffer, RT sent at 9/11/2019 12:53 PM CDT -----  Contact: Radiology  2ND REQUEST  Patient is scheduled for a CT test  and insurance DENIED test, an in basket was sent to providers office for an appeal. Please determine what provider plans on doing and please call patient to get appt cancel and discuss other options if necessary. Any questions please call radiology at ext [phone]

## 2019-09-11 NOTE — TELEPHONE ENCOUNTER
Unable to contact insurance company at this time due to closing time.     Spoke with pt informing that CT scan was denied and cancelled due to denial. Advised will contact insurance company on tomorrow to discuss denial and what's needed to get an approval for the imaging to be completed. Voiced understanding request finding out what his out of pocket cost would be for this imaging. Ensured will have all information needed upon return call on tomorrow to discuss in further detail. Voiced understanding. Call concluded.

## 2019-09-12 NOTE — TELEPHONE ENCOUNTER
Can you call imaging and see how much the scan would cost patient without insurance. Do they have payment plans.

## 2019-09-13 ENCOUNTER — OFFICE VISIT (OUTPATIENT)
Dept: OTOLARYNGOLOGY | Facility: CLINIC | Age: 64
End: 2019-09-13
Payer: COMMERCIAL

## 2019-09-13 ENCOUNTER — TELEPHONE (OUTPATIENT)
Dept: FAMILY MEDICINE | Facility: CLINIC | Age: 64
End: 2019-09-13

## 2019-09-13 VITALS — HEIGHT: 73 IN | WEIGHT: 239.19 LBS | BODY MASS INDEX: 31.7 KG/M2

## 2019-09-13 DIAGNOSIS — R22.1 NECK MASS: ICD-10-CM

## 2019-09-13 DIAGNOSIS — R59.0 SUBMANDIBULAR LYMPHADENOPATHY: Primary | ICD-10-CM

## 2019-09-13 PROCEDURE — 99244 OFF/OP CNSLTJ NEW/EST MOD 40: CPT | Mod: S$GLB,,, | Performed by: PHYSICIAN ASSISTANT

## 2019-09-13 PROCEDURE — 99999 PR PBB SHADOW E&M-EST. PATIENT-LVL III: ICD-10-PCS | Mod: PBBFAC,,, | Performed by: PHYSICIAN ASSISTANT

## 2019-09-13 PROCEDURE — 99999 PR PBB SHADOW E&M-EST. PATIENT-LVL III: CPT | Mod: PBBFAC,,, | Performed by: PHYSICIAN ASSISTANT

## 2019-09-13 PROCEDURE — 99244 PR OFFICE CONSULTATION,LEVEL IV: ICD-10-PCS | Mod: S$GLB,,, | Performed by: PHYSICIAN ASSISTANT

## 2019-09-13 NOTE — TELEPHONE ENCOUNTER
Spoke with pt and verbalized the cost of the CT and he said out of my pocket and I said yes sir. Pt said that he went to the ear nose and throat doctor this morning  and they said they were going fax Dr. Gonsales  the paperwork today cause that doctor  will be out on Monday and Tuesday of next week. He said he will call us back on Monday to schedule a appt. Pt understood.

## 2019-09-13 NOTE — Clinical Note
Agree with getting CT Neck with contrast and likely US guided FNA pending those results.  Thanks for the referral

## 2019-09-13 NOTE — PROGRESS NOTES
"Subjective:       Patient ID: Demario Live is a 63 y.o. male.    Chief Complaint: Consult (swelling in neck)    Patient is a very pleasant 63 year old gentleman here to see me today for the first time in consultation at the request of Dr. Gonsales for evaluation at left neck mass.  Patient reports feeling a small knot under his left mandible about one year ago.  He says it's gradually increased in size over the past 3 months.  Denies any pain associated with it.  Denies any redness or drainage thru the overlying skin.  Denies dysphagia with solids or liquids.  Denies hoarseness.  He has never been a smoker.  No recent fever or weight loss.  Denies otalgia or trismus.  He had CT Neck scheduled yesterday but it was not done (issues with insurance).  He has not had recent dental exam.  He denies dental pain but says he knows he has "bad teeth" that need work.    Review of Systems   Constitutional: Negative for activity change, appetite change, fever and unexpected weight change.   HENT: Positive for congestion and dental problem ("bad teeth" that he need repair ). Negative for ear discharge, ear pain, facial swelling, mouth sores, nosebleeds, rhinorrhea, sinus pressure, sinus pain, sore throat, trouble swallowing and voice change.    Eyes: Negative for discharge and visual disturbance.   Respiratory: Negative for cough and shortness of breath.    Cardiovascular: Negative for chest pain and palpitations.   Gastrointestinal: Negative for diarrhea, nausea and vomiting.   Musculoskeletal: Negative for neck pain.   Allergic/Immunologic: Negative for food allergies.   Neurological: Negative for dizziness, light-headedness and headaches.   Hematological: Positive for adenopathy (left neck).   Psychiatric/Behavioral: Negative for confusion.       Objective:      Physical Exam   Constitutional: He is oriented to person, place, and time. Vital signs are normal. He appears well-developed and well-nourished. He is cooperative. No " distress.   HENT:   Head: Normocephalic and atraumatic.   Right Ear: Tympanic membrane, external ear and ear canal normal. Tympanic membrane is not erythematous. No middle ear effusion.   Left Ear: Tympanic membrane, external ear and ear canal normal. Tympanic membrane is not erythematous.  No middle ear effusion.   Nose: No mucosal edema, rhinorrhea, nasal deformity or septal deviation. Right sinus exhibits no maxillary sinus tenderness and no frontal sinus tenderness. Left sinus exhibits no maxillary sinus tenderness and no frontal sinus tenderness.   Mouth/Throat: Uvula is midline, oropharynx is clear and moist and mucous membranes are normal. No trismus in the jaw. Abnormal dentition (significant gingivitis with white debris along gumline but no tenderness with palpation; broken left lower molar ). Dental caries present. No uvula swelling. No oropharyngeal exudate or posterior oropharyngeal edema.   Eyes: Pupils are equal, round, and reactive to light. Conjunctivae and EOM are normal. Right eye exhibits no chemosis. Left eye exhibits no chemosis. Right conjunctiva is not injected. Left conjunctiva is not injected. No scleral icterus.   eyeglasses   Neck: Trachea normal and phonation normal. No tracheal tenderness present. No tracheal deviation present. No thyroid mass and no thyromegaly present.   Cardiovascular: Intact distal pulses.   Pulmonary/Chest: Effort normal. No accessory muscle usage or stridor. No respiratory distress.   Lymphadenopathy:        Head (right side): No submental, no submandibular, no preauricular and no posterior auricular adenopathy present.        Head (left side): Submandibular (firm, mobile 2.5 cm mass; not tender; no erythema or ulceration of overlying skin) adenopathy present. No submental, no preauricular and no posterior auricular adenopathy present.     He has no cervical adenopathy.        Right cervical: No superficial cervical and no deep cervical adenopathy present.        "Left cervical: No superficial cervical and no deep cervical adenopathy present.   Neurological: He is alert and oriented to person, place, and time. No cranial nerve deficit.   Skin: Skin is warm and dry. No rash noted. No erythema.   Psychiatric: He has a normal mood and affect. His behavior is normal. Thought content normal.         Recent US 9/6/2019:  EXAMINATION:  US SOFT TISSUE HEAD NECK THYROID    CLINICAL HISTORY:  Localized swelling, mass and lump, neck    TECHNIQUE:  Soft tissue imaging performed    COMPARISON:  None.    FINDINGS:  Solid hypoechoic masses present in the left submandibular region measuring 2.4 and 1.4 cm respectively.  Similar smaller left parotid masses noted.  Findings likely represent abnormal lymph nodes.  Correlate with CT soft tissue neck with/without contrast.            Assessment:       1. Submandibular lymphadenopathy    2. Neck mass        Plan:         Mr. Live presents today for initial evaluation of a neck mass.  This represents a problem of high complexity.      We discussed the differential diagnosis of masses present for greater than 2-3 weeks within the neck and lower face is quite broad.     In children under 16, the majority of masses are congenital (branchial cleft cyst, thyroglossal duct cyst) or inflammatory.  Patients between 16-40 have a somewhat higher risk of neoplastic masses, but the majority remain the same as their younger counterparts.  In adults over 40, 80% of neck masses are neoplastic and 80% of those are malignant (excluding thyroid and parotid masses).     Inflammatory "masses" commonly occur where lymph nodes have enlarged while forming the inflammatory response to a pathogen.  Typically, patients with this have acute onset with associated fever, tenderness, erythema, rubor and commonly evidence of a primary infection source (odontogenic, pharyngitis, etc).  Where there is no real evidence to support an infectious source, empiric antibiotics should be " foregone and further workup carried out as most neck masses in adults are neoplastic.     Noninfectious inflammatory lymphadenopathy may occur in patients with sarcoidosis or other granulomatous diseases.    Neoplasms of the neck can be divided into malignant and nonmalignant entitities.  Non malignant masses most often occur in the thyroid or parotid glands. Neck masses that are non-infectious; have been present for =2 weeks or an unknown duration; and are fixed, firm, >1.5 cm in size, and with ulceration of overlying skin are at increased risk of being malignant.   Lymphoma is one of the few entities where malignancy occurs in a lymph node of the neck that is not metastatic in nature; therefore a thorough search must be made to identify the primary site.  Patient risk factors and the location of the lymph node can be helpful in directing this search.    Ultimately, in the vast majority of cases, evaluation should begin with a thorough head and neck exam, flexible fiberoptic laryngoscopy, imaging, typically CT (except for thyroid) with contrast and fine needle aspiration.  Further evaluation is determined based on the results of this.        He is waiting to hear back about his out-of-pocket expense for CT Neck.  I recommend CT Neck with contrast for further evaluation and discussed with patient that pending those result he likely needs US guided FNA and then RTC with Dr. Sanz or Rinku to review results and for scope exam.  He voiced understanding.

## 2019-09-13 NOTE — TELEPHONE ENCOUNTER
Spoke with pt informed that no testing will be scheduled until ENT appt completed. Voiced understanding stating ENT appt completed today. States dr stated that she will discuss findings with Dr. Gonsales but wanted to do the CT before moving forward. Advised pt that once providers come to an agreement of tx we will reach out to inform of decisions and what's next with the plan of care. Voiced understanding. Call concluded.

## 2019-09-13 NOTE — TELEPHONE ENCOUNTER
Phoned pre service center to find out out of pocket cost for pt to have CT completed. Transferred to Central pricing office at [phone] for further details. Spoke with Desire in central pricing office quotes CT priced as self pay estimate $435.42. Testing needs to be scheduled before payment plan can be put into place.

## 2019-09-16 DIAGNOSIS — Z79.899 LONG TERM USE OF DRUG: ICD-10-CM

## 2019-09-16 DIAGNOSIS — R93.89 IMAGING ABNORMALITY: Primary | ICD-10-CM

## 2019-09-16 NOTE — TELEPHONE ENCOUNTER
Phoned pt to schedule CT w/ Contrast. Agreed to Friday 9/20/19 at 7:30am at [facility] to be completed.     Please enter STAT CMP that is usually required when imaging is completed with Contrast for radiology.     Thanks.

## 2019-09-18 ENCOUNTER — TELEPHONE (OUTPATIENT)
Dept: FAMILY MEDICINE | Facility: CLINIC | Age: 64
End: 2019-09-18

## 2019-09-18 DIAGNOSIS — Z79.899 LONG TERM USE OF DRUG: ICD-10-CM

## 2019-09-18 DIAGNOSIS — R93.89 IMAGING ABNORMALITY: Primary | ICD-10-CM

## 2019-09-18 NOTE — TELEPHONE ENCOUNTER
----- Message from RT Regulo sent at 9/18/2019  2:25 PM CDT -----  Regarding: pt needs STAT creatinine and lab appointment booked  Mr Live needs STAT creatinine ordered before his CT scan Friday at 7:30am. Please have patient come tomorrow to get lab work completed so labs can be received for his 7:30am exam.  Please make sure they are STAT.     Please contact patient to make them aware of the changes. We can not do patient until lab results are received.     Patients 60 years and older must have labs within a 30 day window.         Thanks Elena Chu   Please call with any question [phone]

## 2019-09-19 ENCOUNTER — TELEPHONE (OUTPATIENT)
Dept: FAMILY MEDICINE | Facility: CLINIC | Age: 64
End: 2019-09-19

## 2019-09-19 ENCOUNTER — LAB VISIT (OUTPATIENT)
Dept: LAB | Facility: HOSPITAL | Age: 64
End: 2019-09-19
Attending: FAMILY MEDICINE
Payer: COMMERCIAL

## 2019-09-19 ENCOUNTER — TELEPHONE (OUTPATIENT)
Dept: RADIOLOGY | Facility: HOSPITAL | Age: 64
End: 2019-09-19

## 2019-09-19 DIAGNOSIS — Z79.899 LONG TERM USE OF DRUG: ICD-10-CM

## 2019-09-19 DIAGNOSIS — R93.89 IMAGING ABNORMALITY: ICD-10-CM

## 2019-09-19 PROCEDURE — 80053 COMPREHEN METABOLIC PANEL: CPT

## 2019-09-19 PROCEDURE — 36415 COLL VENOUS BLD VENIPUNCTURE: CPT | Mod: PO

## 2019-09-19 NOTE — TELEPHONE ENCOUNTER
Spoke with pt states that he will be in around 4pm today to have labs completed. States has meetings today at work and unable to come in earlier to have labs completed.

## 2019-09-19 NOTE — TELEPHONE ENCOUNTER
----- Message from Mary Zelaya sent at 9/19/2019  8:37 AM CDT -----  Contact: pt  Type:  Patient Returning Call    Who Called:Patient  Who Left Message for Patient:Elisa  Does the patient know what this is regarding?:na  Would the patient rather a call back or a response via [facility]? Call back  Best Call Back Number:[phone]  Additional Information: na

## 2019-09-19 NOTE — TELEPHONE ENCOUNTER
----- Message from Sheeba Gonsales MD sent at 9/19/2019  7:19 AM CDT -----  Please make sure patient gets lab work done today. I placed lab order. Needs to come in to get it before tomorrow.

## 2019-09-20 ENCOUNTER — HOSPITAL ENCOUNTER (OUTPATIENT)
Dept: RADIOLOGY | Facility: HOSPITAL | Age: 64
Discharge: HOME | End: 2019-09-20
Attending: FAMILY MEDICINE
Payer: COMMERCIAL

## 2019-09-20 ENCOUNTER — TELEPHONE (OUTPATIENT)
Dept: FAMILY MEDICINE | Facility: CLINIC | Age: 64
End: 2019-09-20

## 2019-09-20 DIAGNOSIS — R59.9 SWOLLEN LYMPH NODES: ICD-10-CM

## 2019-09-20 DIAGNOSIS — R22.1 NECK MASS: ICD-10-CM

## 2019-09-20 DIAGNOSIS — R22.1 NECK SWELLING: ICD-10-CM

## 2019-09-20 LAB
ALBUMIN SERPL BCP-MCNC: 3.7 G/DL (ref 3.5–5.2)
ALP SERPL-CCNC: 99 U/L (ref 55–135)
ALT SERPL W/O P-5'-P-CCNC: 19 U/L (ref 10–44)
ANION GAP SERPL CALC-SCNC: 16 MMOL/L (ref 8–16)
AST SERPL-CCNC: 18 U/L (ref 10–40)
BILIRUB SERPL-MCNC: 0.3 MG/DL (ref 0.1–1)
BUN SERPL-MCNC: 21 MG/DL (ref 8–23)
CALCIUM SERPL-MCNC: 10.1 MG/DL (ref 8.7–10.5)
CHLORIDE SERPL-SCNC: 103 MMOL/L (ref 95–110)
CO2 SERPL-SCNC: 22 MMOL/L (ref 23–29)
CREAT SERPL-MCNC: 1.2 MG/DL (ref 0.5–1.4)
CREAT SERPL-MCNC: 1.2 MG/DL (ref 0.5–1.4)
EST. GFR  (AFRICAN AMERICAN): >60 ML/MIN/1.73 M^2
EST. GFR  (AFRICAN AMERICAN): >60 ML/MIN/1.73 M^2
EST. GFR  (NON AFRICAN AMERICAN): >60 ML/MIN/1.73 M^2
EST. GFR  (NON AFRICAN AMERICAN): >60 ML/MIN/1.73 M^2
GLUCOSE SERPL-MCNC: 81 MG/DL (ref 70–110)
POTASSIUM SERPL-SCNC: 3.7 MMOL/L (ref 3.5–5.1)
PROT SERPL-MCNC: 7.7 G/DL (ref 6–8.4)
SODIUM SERPL-SCNC: 141 MMOL/L (ref 136–145)

## 2019-09-20 PROCEDURE — 70491 CT SOFT TISSUE NECK W/DYE: CPT | Mod: TC

## 2019-09-20 PROCEDURE — 70491 CT SOFT TISSUE NECK WITH CONTRAST: ICD-10-PCS | Mod: 26,,, | Performed by: RADIOLOGY

## 2019-09-20 PROCEDURE — 25500020 PHARM REV CODE 255: Performed by: FAMILY MEDICINE

## 2019-09-20 PROCEDURE — 70491 CT SOFT TISSUE NECK W/DYE: CPT | Mod: 26,,, | Performed by: RADIOLOGY

## 2019-09-20 RX ADMIN — IOHEXOL 75 ML: 350 INJECTION, SOLUTION INTRAVENOUS at 07:09

## 2019-09-20 NOTE — PROGRESS NOTES
Mr. Live just complete CT of neck today. These are his results. Should we schedule follow up with you for further workup/biopsy?

## 2019-09-20 NOTE — TELEPHONE ENCOUNTER
Spoke with pt and verbalized that his labs came back ok. And he good to do his CT.  Pt verbalized that he did his CT this morning. Pt understood.

## 2019-09-25 ENCOUNTER — TELEPHONE (OUTPATIENT)
Dept: OTOLARYNGOLOGY | Facility: CLINIC | Age: 64
End: 2019-09-25

## 2019-09-25 DIAGNOSIS — R22.1 NECK MASS: ICD-10-CM

## 2019-09-25 DIAGNOSIS — R59.0 SUBMANDIBULAR LYMPHADENOPATHY: Primary | ICD-10-CM

## 2019-09-25 NOTE — TELEPHONE ENCOUNTER
Please let patient know that his recent CT Neck shows mass in LEFT submandibular area, more suggestive of an abnormal lymph node or possible neoplasm.  Recommend biopsy as we discussed in clinic.  Please call patient to schedule US guided FNA with Radiology and then RTC with Dr. Sanz one week later to review results.

## 2019-09-26 ENCOUNTER — TELEPHONE (OUTPATIENT)
Dept: OTOLARYNGOLOGY | Facility: CLINIC | Age: 64
End: 2019-09-26

## 2019-09-26 NOTE — TELEPHONE ENCOUNTER
----- Message from Otoniel Krishna sent at 9/26/2019 10:32 AM CDT -----  Contact: [phone]  .Type:  Patient Returning Call    Who Called:Demario Live  Who Left Message for Patient:Faby   Does the patient know what this is regarding?:Unsure   Would the patient rather a call back or a response via [facility]? Call back   Best Call Back Number:.[phone] (home)   Additional Information:

## 2019-09-26 NOTE — TELEPHONE ENCOUNTER
I called and discussed results of CT Neck with patient and my recommendation for US guided FNA/biopsy and then RTC with  to review results.  He wishes to discuss further with his wife prior to scheduling.  He says this area has been present for quite some time and is not bothering him therefore he's unsure about proceeding with biopsy.  I discussed that without a biopsy there's potential for a missed malignancy which could spread to other areas of his body.  He voiced understanding and says he'll call back to schedule when ready but he wants to discuss with wife first.

## 2019-09-26 NOTE — TELEPHONE ENCOUNTER
I conveyed the message below to the patient.  He asked exactly how they would obtain the biopsy.  I told him they would numb the area up and take a small sample.  The patient would really like to speak with you about this in more detail.  Then after he gets more information he wants to talk it over with his wife before scheduling. Please call him back at [phone].  Thanks

## 2019-10-03 ENCOUNTER — TELEPHONE (OUTPATIENT)
Dept: OTOLARYNGOLOGY | Facility: CLINIC | Age: 64
End: 2019-10-03

## 2019-10-03 NOTE — TELEPHONE ENCOUNTER
----- Message from Arely Onofre sent at 10/3/2019  1:31 PM CDT -----  Contact: pt  Pt request call back from nurse to schedule biopsy ... Call back: [phone] ([facility])

## 2019-10-03 NOTE — TELEPHONE ENCOUNTER
Patient would like call back in 20min to write down number to radiology [phone] to schedule FNA.

## 2019-10-04 ENCOUNTER — TELEPHONE (OUTPATIENT)
Dept: UROLOGY | Facility: CLINIC | Age: 64
End: 2019-10-04

## 2019-10-18 ENCOUNTER — HOSPITAL ENCOUNTER (OUTPATIENT)
Dept: RADIOLOGY | Facility: HOSPITAL | Age: 64
Discharge: HOME | End: 2019-10-18
Attending: PHYSICIAN ASSISTANT
Payer: COMMERCIAL

## 2019-10-18 DIAGNOSIS — R22.1 NECK MASS: ICD-10-CM

## 2019-10-18 DIAGNOSIS — R59.0 SUBMANDIBULAR LYMPHADENOPATHY: ICD-10-CM

## 2019-10-18 PROCEDURE — 10005 FNA BX W/US GDN 1ST LES: CPT

## 2019-10-18 PROCEDURE — 88189 FLOWCYTOMETRY/READ 16 & >: CPT | Mod: ,,, | Performed by: PATHOLOGY

## 2019-10-18 PROCEDURE — 88189 PR  FLOWCYTOMETRY/READ, 16 & > MARKERS: ICD-10-PCS | Mod: ,,, | Performed by: PATHOLOGY

## 2019-10-18 PROCEDURE — 88185 FLOWCYTOMETRY/TC ADD-ON: CPT | Performed by: PATHOLOGY

## 2019-10-18 PROCEDURE — 88173 CYTOPATH EVAL FNA REPORT: CPT | Performed by: PATHOLOGY

## 2019-10-18 PROCEDURE — 88173 CYTOLOGY SPECIMEN-FNA-RADIOLOGY ASSISTED WITH PATH ADEQUACY-CORE BX: ICD-10-PCS | Mod: 26,,, | Performed by: PATHOLOGY

## 2019-10-18 PROCEDURE — 88184 FLOWCYTOMETRY/ TC 1 MARKER: CPT | Performed by: PATHOLOGY

## 2019-10-18 PROCEDURE — 88173 CYTOPATH EVAL FNA REPORT: CPT | Mod: 26,,, | Performed by: PATHOLOGY

## 2019-10-18 NOTE — DISCHARGE SUMMARY
Pre Op Diagnosis: left submandibular mass     Post Op Diagnosis: same     Procedure:  FNA      Procedure performed by: Nasir SCRUGGS, Aida COATS     Written Informed Consent Obtained: Yes     Specimen Removed:  yes     Estimated Blood Loss:  minimal     Findings: Local anesthesia and moderate sedation were used.     The patient tolerated the procedure well and there were no complications.      Sterile technique was performed in the left submandibular area, lidocaine was used as a local anesthetic.  Multiple samples taken from left submandibular mass.  Pt tolerated the procedure well without immediate complications.  Please see radiologist report for details. F/u with PCP and/or ordering physician.

## 2019-10-18 NOTE — H&P
[facility] -   History & Physical    Subjective:      Chief Complaint/Reason for Admission: left submandibular mass    Demario Live is a 63 y.o. male.    Past Medical History:   Diagnosis Date    HLD (hyperlipidemia) 2/15/2019    Hypertension      Past Surgical History:   Procedure Laterality Date    LIPOMA RESECTION       Family History   Problem Relation Age of Onset    Stroke Mother     Heart disease Father      Social History     Tobacco Use    Smoking status: Never Smoker    Smokeless tobacco: Never Used   Substance Use Topics    Alcohol use: Yes     Comment: 1 beer every 3-6 months    Drug use: No         (Not in a hospital admission)  Review of patient's allergies indicates:  No Known Allergies     Review of Systems   Constitutional: Negative.    HENT: Negative.    Eyes: Negative.    Respiratory: Negative.    Skin: Negative.        Objective:      Vital Signs (Most Recent)       Vital Signs Range (Last 24H):  BP: ()/()   Arterial Line BP: ()/()     Physical Exam   Constitutional: He appears well-nourished.   HENT:   Head: Atraumatic.   Eyes: EOM are normal.   Neck: Neck supple.   Pulmonary/Chest: Effort normal.       Data Review:    CBC:   Lab Results   Component Value Date    WBC 7.54 08/16/2019    RBC 4.88 08/16/2019    HGB 13.1 (L) 08/16/2019    HCT 43.5 08/16/2019     08/16/2019      ECG: no prior ECG.     Assessment:      There are no hospital problems to display for this patient.      Plan:    U/s fna left submandibular mass

## 2019-10-21 LAB
FLOW CYTOMETRY ANTIBODIES ANALYZED - TISSUE: NORMAL
FLOW CYTOMETRY COMMENT - TISSUE: NORMAL
FLOW CYTOMETRY INTERPRETATION - TISSUE: NORMAL
SPECIMEN TYPE - TISSUE: NORMAL

## 2019-10-25 ENCOUNTER — TELEPHONE (OUTPATIENT)
Dept: OTOLARYNGOLOGY | Facility: CLINIC | Age: 64
End: 2019-10-25

## 2019-10-25 DIAGNOSIS — R59.0 SUBMANDIBULAR LYMPHADENOPATHY: Primary | ICD-10-CM

## 2019-10-30 NOTE — TELEPHONE ENCOUNTER
Called and reviewed results of recent FNA with patient.   I have reviewed FNA results with Dr. Dobbs who thinks the CT report warrants followup despite benign FNA.   Recommend repeat US in 2 months and then appointment with Dr. Sanz afterwards to review results.  Patient aware of this plan and asked that you call him with appointment times.

## 2019-10-30 NOTE — TELEPHONE ENCOUNTER
I left a message for the patient letting him know that I will book the ultrasound and follow up with Dr. Sanz for 2 months from now.  I advised that I will place them in the mail.  I asked that he call back to reschedule if the dates or times are not good for him.

## 2019-11-07 DIAGNOSIS — Z12.11 COLON CANCER SCREENING: ICD-10-CM

## 2019-12-19 ENCOUNTER — PATIENT OUTREACH (OUTPATIENT)
Dept: ADMINISTRATIVE | Facility: HOSPITAL | Age: 64
End: 2019-12-19

## 2019-12-26 ENCOUNTER — TELEPHONE (OUTPATIENT)
Dept: RADIOLOGY | Facility: HOSPITAL | Age: 64
End: 2019-12-26

## 2019-12-27 ENCOUNTER — HOSPITAL ENCOUNTER (OUTPATIENT)
Dept: RADIOLOGY | Facility: HOSPITAL | Age: 64
Discharge: HOME | End: 2019-12-27
Attending: PHYSICIAN ASSISTANT
Payer: MEDICARE

## 2019-12-27 ENCOUNTER — TELEPHONE (OUTPATIENT)
Dept: OTOLARYNGOLOGY | Facility: CLINIC | Age: 64
End: 2019-12-27

## 2019-12-27 DIAGNOSIS — R59.0 SUBMANDIBULAR LYMPHADENOPATHY: ICD-10-CM

## 2019-12-27 PROCEDURE — 76536 US EXAM OF HEAD AND NECK: CPT | Mod: TC

## 2019-12-27 PROCEDURE — 76536 US SOFT TISSUE HEAD NECK THYROID: ICD-10-PCS | Mod: 26,,, | Performed by: RADIOLOGY

## 2019-12-27 PROCEDURE — 76536 US EXAM OF HEAD AND NECK: CPT | Mod: 26,,, | Performed by: RADIOLOGY

## 2019-12-27 NOTE — TELEPHONE ENCOUNTER
Conveyed US results to patient. He voiced understanding and keep scheduled appt with Dr. Sanz on 12/31/19.

## 2019-12-31 ENCOUNTER — OFFICE VISIT (OUTPATIENT)
Dept: OTOLARYNGOLOGY | Facility: CLINIC | Age: 64
End: 2019-12-31
Payer: MEDICARE

## 2019-12-31 VITALS
DIASTOLIC BLOOD PRESSURE: 76 MMHG | BODY MASS INDEX: 31 KG/M2 | SYSTOLIC BLOOD PRESSURE: 160 MMHG | TEMPERATURE: 98 F | WEIGHT: 233.94 LBS | HEART RATE: 92 BPM | HEIGHT: 73 IN

## 2019-12-31 DIAGNOSIS — K11.8 SUBMANDIBULAR GLAND MASS: Primary | ICD-10-CM

## 2019-12-31 PROCEDURE — 99214 PR OFFICE/OUTPT VISIT, EST, LEVL IV, 30-39 MIN: ICD-10-PCS | Mod: S$GLB,,, | Performed by: OTOLARYNGOLOGY

## 2019-12-31 PROCEDURE — 3077F PR MOST RECENT SYSTOLIC BLOOD PRESSURE >= 140 MM HG: ICD-10-PCS | Mod: CPTII,S$GLB,, | Performed by: OTOLARYNGOLOGY

## 2019-12-31 PROCEDURE — 3078F PR MOST RECENT DIASTOLIC BLOOD PRESSURE < 80 MM HG: ICD-10-PCS | Mod: CPTII,S$GLB,, | Performed by: OTOLARYNGOLOGY

## 2019-12-31 PROCEDURE — 3078F DIAST BP <80 MM HG: CPT | Mod: CPTII,S$GLB,, | Performed by: OTOLARYNGOLOGY

## 2019-12-31 PROCEDURE — 99999 PR PBB SHADOW E&M-EST. PATIENT-LVL III: ICD-10-PCS | Mod: PBBFAC,,, | Performed by: OTOLARYNGOLOGY

## 2019-12-31 PROCEDURE — 3008F BODY MASS INDEX DOCD: CPT | Mod: CPTII,S$GLB,, | Performed by: OTOLARYNGOLOGY

## 2019-12-31 PROCEDURE — 99214 OFFICE O/P EST MOD 30 MIN: CPT | Mod: S$GLB,,, | Performed by: OTOLARYNGOLOGY

## 2019-12-31 PROCEDURE — 3077F SYST BP >= 140 MM HG: CPT | Mod: CPTII,S$GLB,, | Performed by: OTOLARYNGOLOGY

## 2019-12-31 PROCEDURE — 3008F PR BODY MASS INDEX (BMI) DOCUMENTED: ICD-10-PCS | Mod: CPTII,S$GLB,, | Performed by: OTOLARYNGOLOGY

## 2019-12-31 PROCEDURE — 99999 PR PBB SHADOW E&M-EST. PATIENT-LVL III: CPT | Mod: PBBFAC,,, | Performed by: OTOLARYNGOLOGY

## 2019-12-31 PROCEDURE — 1101F PT FALLS ASSESS-DOCD LE1/YR: CPT | Mod: CPTII,S$GLB,, | Performed by: OTOLARYNGOLOGY

## 2019-12-31 PROCEDURE — 1101F PR PT FALLS ASSESS DOC 0-1 FALLS W/OUT INJ PAST YR: ICD-10-PCS | Mod: CPTII,S$GLB,, | Performed by: OTOLARYNGOLOGY

## 2019-12-31 NOTE — PROGRESS NOTES
"Subjective:   Patient: Demario Live [id number], :1955   Visit date:2019 11:50 AM    Chief Complaint:  Follow-up (rev u/s)    HPI:  Demario is a 64 y.o. male who is here for follow-up. Patient is a very pleasant 63 year old gentleman here to see me today for the first time in consultation at the request of Dr. Gonsales for evaluation at left neck mass.  Patient reports feeling a small knot under his left mandible about one year ago.  He says it's gradually increased in size over the past 3 months.  Denies any pain associated with it.  Denies any redness or drainage thru the overlying skin.  Denies dysphagia with solids or liquids.  Denies hoarseness.  He has never been a smoker.  No recent fever or weight loss.  Denies otalgia or trismus.  He had CT Neck scheduled yesterday but it was not done (issues with insurance).  He has not had recent dental exam.  He denies dental pain but says he knows he has "bad teeth" that need work.        Review of Systems:  -     Allergic/Immunologic: has No Known Allergies..  -     Constitutional: Current temp: 98.1 °F (36.7 °C) (Tympanic)    His meds, allergies, medical, surgical, social & family histories were reviewed & updated:  -     He has a current medication list which includes the following prescription(s): amlodipine, aspirin, atorvastatin, lisinopril-hydrochlorothiazide, and sildenafil.  -     He  has a past medical history of HLD (hyperlipidemia) (2/15/2019) and Hypertension.   -     He does not have any pertinent problems on file.   -     He  has a past surgical history that includes Lipoma resection.  -     He  reports that he has never smoked. He has never used smokeless tobacco. He reports that he drinks alcohol. He reports that he does not use drugs.  -     His family history includes Heart disease in his father; Stroke in his mother.  -     He has No Known Allergies.    Objective:     Physical Exam:  Vitals:  BP (!) 160/76   Pulse 92   Temp 98.1 °F (36.7 " "°C) (Tympanic)   Ht 6' 1" (1.854 m)   Wt 106.1 kg (233 lb 14.5 oz)   BMI 30.86 kg/m²   Appearance:  Well-developed, well-nourished.  Communication:  Able to communicate, no hoarseness.  Head & Face:  Normocephalic, atraumatic, no sinus tenderness, normal facial strength.  Eyes:  Extraocular motions intact.  Ears:  Otoscopy of external auditory canals and tympanic membranes was normal, clinical speech reception thresholds grossly intact, no mass/lesion of auricle.  Nose:  No masses/lesions of external nose, nasal mucosa, septum, and turbinates were within normal limits.  Mouth:  No mass/lesion of lips, teeth, gums, hard/soft palate, tongue, tonsils, or oropharynx.  Neck & Lymphatics:  No cervical lymphadenopathy, no neck mass/crepitus/ asymmetry, trachea is midline, no thyroid enlargement/tenderness/mass.  Palpable mass in left level 1A.  This is nontender and mobile.  Neuro/Psych: Alert with normal mood and affect.   Abdominal: Normal appearance.   Respiration/Chest:  Symmetric expansion during respiration, normal respiratory effort.  Skin:  Warm and intact  Cardiovascular:  No peripheral vascular edema or varicosities.    CT Neck with Contrast  Results for orders placed during the hospital encounter of 09/20/19   CT Soft Tissue Neck With Contrast    Narrative EXAMINATION:  CT SOFT TISSUE NECK WITH CONTRAST    CLINICAL HISTORY:  neck mass;Localized swelling, mass and lump, neck    TECHNIQUE:  Low dose axial images as well as sagittal and coronal reconstructions were performed from the skull base to the clavicles following the intravenous administration of 75 mL of Omnipaque 350.    COMPARISON:  Thyroid ultrasound from 09/06/2019    FINDINGS:  BB marker has been placed adjacent to the left submandibular gland.  There is a hypoattenuating mass in the left submandibular gland.  For example, please see image 84 of series 2.  Alternatively, see image 49 of the coronal sequence.  This measures 18 x 12 x 16 mm.  Margins " appear slightly irregular as seen on ultrasound as well.  Largest submandibular mass seen on the recent ultrasound measured 24 x 17 x 20 mm.  Adjacent to the left submandibular gland are small lymph nodes.  One of these measures 14 by 9 mm and correlates to the ultrasound.  No right submandibular gland mass is seen.  No glandular enlargement.    Multiple intraparotid lymph nodes are also seen, left greater than right.  The largest lymph node is in the left parotid gland and measures 11 by 11 by 10 mm.  For reference, see image 67 of series 2 or image 86 of the coronal sequence.  Findings correlate well with ultrasound.  A few small scattered right parotid gland lymph nodes are also noted.    Thyroid gland is within normal limits.    The visualized lung apices appear clear.  The visualized portions of the brain parenchyma are within normal limits.  Visualized orbits remain intact.  Intracranial vessels remain grossly patent as visualized.  Skull is intact.  The visualized paranasal sinuses are clear aside from mild mucoperiosteal thickening of the maxillary sinuses.  Mastoid air cells are normally pneumatized.  Streak artifact from dental hardware is present multilevel discogenic degenerative changes of the cervical spine are seen.    The nasopharynx oropharynx and hypopharynx and subglottic trachea are patent.  Vocal cords are symmetric.  Valleculae and piriform sinuses are within normal limits.  The epiglottis demonstrates a normal appearance.  The parapharyngeal and retropharyngeal fat planes are preserved.  Mild deviation of the uvula is seen.  Clinical correlation is suggested.  A few small scattered cervical lymph nodes are seen the visualized vessels of the neck remain grossly patent.      Impression Hypoattenuating mass in the left submandibular gland corresponding to the palpable abnormality and correlates well with the recent ultrasound from 09/06/2019.  Mass measures slightly smaller although differences  could be related to in differences in modality.  Differential should include lymph node although salivary gland neoplasm would be difficult to exclude.  The appearance on ultrasound was more suggestive of an abnormal lymph node/neoplasm.  Additional findings as above.      Electronically signed by: Eliseo Anderson MD  Date:    09/20/2019  Time:    08:24     FINAL PATHOLOGIC DIAGNOSIS  Left submandibular region, FNA:  Negative for malignant cells.  Cyst contents (macrophages and proteinaceous debris only.  [facility]  Diagnosed by: Roberto Schumacher M.D.  (Electronically Signed: 2019-10-25 11:58:41)      Assessment & Plan:   Demario was seen today for follow-up.    Diagnoses and all orders for this visit:    Submandibular gland mass      I discussed with the patient the options of further observation versus left submandibular gland excision.  I would recommend left submandibular gland excision.  We discussed the risks of bleeding, infection, scarring, weakness of the lip or tongue, numbness of the tongue, xerostomia.  He would like to discuss this further with his family in contact me if he desires to proceed with surgery.

## 2020-02-17 ENCOUNTER — OFFICE VISIT (OUTPATIENT)
Dept: FAMILY MEDICINE | Facility: CLINIC | Age: 65
End: 2020-02-17
Payer: MEDICARE

## 2020-02-17 VITALS
HEIGHT: 73 IN | HEART RATE: 100 BPM | DIASTOLIC BLOOD PRESSURE: 83 MMHG | BODY MASS INDEX: 31.18 KG/M2 | WEIGHT: 235.25 LBS | SYSTOLIC BLOOD PRESSURE: 142 MMHG | TEMPERATURE: 98 F | OXYGEN SATURATION: 98 %

## 2020-02-17 DIAGNOSIS — Z23 NEED FOR VACCINATION AGAINST STREPTOCOCCUS PNEUMONIAE USING PNEUMOCOCCAL CONJUGATE VACCINE 13: ICD-10-CM

## 2020-02-17 DIAGNOSIS — M10.9 GOUT, UNSPECIFIED CAUSE, UNSPECIFIED CHRONICITY, UNSPECIFIED SITE: ICD-10-CM

## 2020-02-17 DIAGNOSIS — E78.5 HYPERLIPIDEMIA, UNSPECIFIED HYPERLIPIDEMIA TYPE: ICD-10-CM

## 2020-02-17 DIAGNOSIS — I10 HYPERTENSION, UNSPECIFIED TYPE: Primary | ICD-10-CM

## 2020-02-17 DIAGNOSIS — N52.9 ERECTILE DYSFUNCTION, UNSPECIFIED ERECTILE DYSFUNCTION TYPE: ICD-10-CM

## 2020-02-17 DIAGNOSIS — Z12.11 SCREENING FOR COLON CANCER: ICD-10-CM

## 2020-02-17 PROCEDURE — 3008F PR BODY MASS INDEX (BMI) DOCUMENTED: ICD-10-PCS | Mod: CPTII,S$GLB,, | Performed by: FAMILY MEDICINE

## 2020-02-17 PROCEDURE — 99999 PR PBB SHADOW E&M-EST. PATIENT-LVL IV: ICD-10-PCS | Mod: PBBFAC,,, | Performed by: FAMILY MEDICINE

## 2020-02-17 PROCEDURE — 99214 OFFICE O/P EST MOD 30 MIN: CPT | Mod: 25,S$GLB,, | Performed by: FAMILY MEDICINE

## 2020-02-17 PROCEDURE — 1101F PR PT FALLS ASSESS DOC 0-1 FALLS W/OUT INJ PAST YR: ICD-10-PCS | Mod: CPTII,S$GLB,, | Performed by: FAMILY MEDICINE

## 2020-02-17 PROCEDURE — 1101F PT FALLS ASSESS-DOCD LE1/YR: CPT | Mod: CPTII,S$GLB,, | Performed by: FAMILY MEDICINE

## 2020-02-17 PROCEDURE — G0009 ADMIN PNEUMOCOCCAL VACCINE: HCPCS | Mod: S$GLB,,, | Performed by: FAMILY MEDICINE

## 2020-02-17 PROCEDURE — 3079F PR MOST RECENT DIASTOLIC BLOOD PRESSURE 80-89 MM HG: ICD-10-PCS | Mod: CPTII,S$GLB,, | Performed by: FAMILY MEDICINE

## 2020-02-17 PROCEDURE — 99214 PR OFFICE/OUTPT VISIT, EST, LEVL IV, 30-39 MIN: ICD-10-PCS | Mod: 25,S$GLB,, | Performed by: FAMILY MEDICINE

## 2020-02-17 PROCEDURE — 90670 PNEUMOCOCCAL CONJUGATE VACCINE 13-VALENT LESS THAN 5YO & GREATER THAN: ICD-10-PCS | Mod: S$GLB,,, | Performed by: FAMILY MEDICINE

## 2020-02-17 PROCEDURE — 3079F DIAST BP 80-89 MM HG: CPT | Mod: CPTII,S$GLB,, | Performed by: FAMILY MEDICINE

## 2020-02-17 PROCEDURE — G0009 PNEUMOCOCCAL CONJUGATE VACCINE 13-VALENT LESS THAN 5YO & GREATER THAN: ICD-10-PCS | Mod: S$GLB,,, | Performed by: FAMILY MEDICINE

## 2020-02-17 PROCEDURE — 99999 PR PBB SHADOW E&M-EST. PATIENT-LVL IV: CPT | Mod: PBBFAC,,, | Performed by: FAMILY MEDICINE

## 2020-02-17 PROCEDURE — 99499 UNLISTED E&M SERVICE: CPT | Mod: S$GLB,,, | Performed by: FAMILY MEDICINE

## 2020-02-17 PROCEDURE — 90670 PCV13 VACCINE IM: CPT | Mod: S$GLB,,, | Performed by: FAMILY MEDICINE

## 2020-02-17 PROCEDURE — 99499 RISK ADDL DX/OHS AUDIT: ICD-10-PCS | Mod: S$GLB,,, | Performed by: FAMILY MEDICINE

## 2020-02-17 PROCEDURE — 3077F SYST BP >= 140 MM HG: CPT | Mod: CPTII,S$GLB,, | Performed by: FAMILY MEDICINE

## 2020-02-17 PROCEDURE — 3077F PR MOST RECENT SYSTOLIC BLOOD PRESSURE >= 140 MM HG: ICD-10-PCS | Mod: CPTII,S$GLB,, | Performed by: FAMILY MEDICINE

## 2020-02-17 PROCEDURE — 3008F BODY MASS INDEX DOCD: CPT | Mod: CPTII,S$GLB,, | Performed by: FAMILY MEDICINE

## 2020-02-17 RX ORDER — SODIUM, POTASSIUM,MAG SULFATES 17.5-3.13G
1 SOLUTION, RECONSTITUTED, ORAL ORAL DAILY
Qty: 1 KIT | Refills: 0 | Status: SHIPPED | OUTPATIENT
Start: 2020-02-17 | End: 2020-02-19

## 2020-02-17 RX ORDER — SILDENAFIL 50 MG/1
50 TABLET, FILM COATED ORAL DAILY PRN
Qty: 30 TABLET | Refills: 2 | Status: SHIPPED | OUTPATIENT
Start: 2020-02-17 | End: 2021-07-26

## 2020-02-17 RX ORDER — COLCHICINE 0.6 MG/1
TABLET ORAL
Qty: 10 TABLET | Refills: 2 | Status: SHIPPED | OUTPATIENT
Start: 2020-02-17 | End: 2020-10-06

## 2020-02-17 NOTE — PROGRESS NOTES
Subjective:      Patient ID: Demario Live is a 64 y.o. male.    Chief Complaint: Follow-up (6 mo f/u )    HPI    Patient here today to follow up of HTN, HLD, and ED    HTN - taking all medications as needed, no cp, sob, no headache, no changes in vision. Ate crawfish yesterday.   HLD - Lipitor   ED - as needed sildenafil - hx of prostate biopsy in the past - 40 mg of viagra works sometimes and some times it doesn't.   Gout - usually gets 1 flare a year. Patient doesn't drink alcohol any longer. Unsure when it flares up. Usually in right big toe. Usually uses ibuprofen as needed. Swelling decreases and improves with rest as well. Would like medication for as needed incase of flare.     Needs pneumonia vaccine  Needs shingles vaccine - but patient notes he doesn't remember having chicken pox   Needs colonoscopy     Past Medical History:   Diagnosis Date    HLD (hyperlipidemia) 2/15/2019    Hypertension        Past Surgical History:   Procedure Laterality Date    LIPOMA RESECTION         Family History   Problem Relation Age of Onset    Stroke Mother     Heart disease Father        Social History     Socioeconomic History    Marital status:      Spouse name: Not on file    Number of children: 3    Years of education: Not on file    Highest education level: Not on file   Occupational History    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   Social Needs    Financial resource strain: Not on file    Food insecurity:     Worry: Not on file     Inability: Not on file    Transportation needs:     Medical: Not on file     Non-medical: Not on file   Tobacco Use    Smoking status: Never Smoker    Smokeless tobacco: Never Used   Substance and Sexual Activity    Alcohol use: Yes     Comment: 1 beer every 3-6 months    Drug use: No    Sexual activity: Yes     Partners: Female   Lifestyle    Physical activity:     Days per week: Not on file     Minutes per session: Not on file    Stress: Not on file    Relationships    Social connections:     Talks on phone: Not on file     Gets together: Not on file     Attends Gnosticism service: Not on file     Active member of club or organization: Not on file     Attends meetings of clubs or organizations: Not on file     Relationship status: Not on file   Other Topics Concern    Not on file   Social History Narrative    Not on file       Health Maintenance Topics with due status: Not Due       Topic Last Completion Date    Lipid Panel 08/16/2019       Medication List with Changes/Refills   New Medications    COLCHICINE (COLCRYS) 0.6 MG TABLET    Take 2 pills at first sign of flare and then take one pill one hour later.    SILDENAFIL (VIAGRA) 50 MG TABLET    Take 1 tablet (50 mg total) by mouth daily as needed for Erectile Dysfunction.    SODIUM,POTASSIUM,MAG SULFATES (SUPREP BOWEL PREP KIT) 17.5-3.13-1.6 GRAM SOLR    Take 177 mLs by mouth once daily. for 2 days   Current Medications    AMLODIPINE (NORVASC) 10 MG TABLET    Take 1 tablet (10 mg total) by mouth once daily.    ASPIRIN (ECOTRIN) 81 MG EC TABLET    Take 81 mg by mouth once daily.    ATORVASTATIN (LIPITOR) 10 MG TABLET    Take 1 tablet (10 mg total) by mouth once daily.    LISINOPRIL-HYDROCHLOROTHIAZIDE (PRINZIDE,ZESTORETIC) 20-25 MG TAB    Take 1 tablet by mouth once daily.   Discontinued Medications    SILDENAFIL (REVATIO) 20 MG TAB    Take 1 tablet (20 mg total) by mouth 3 (three) times daily.       Review of patient's allergies indicates:  No Known Allergies    Review of Systems   Constitutional: Negative for chills and fever.   HENT: Negative for congestion.    Eyes: Negative for blurred vision.   Respiratory: Negative for shortness of breath.    Cardiovascular: Negative for chest pain and leg swelling.   Gastrointestinal: Negative for abdominal pain, constipation and diarrhea.   Genitourinary: Negative for dysuria.        ED   Skin: Negative for rash.   Neurological: Negative for headaches.        Objective:     Vitals:    02/17/20 0738   BP: (!) 142/83   Pulse: 100   Temp: 98.4 °F (36.9 °C)     Body mass index is 31.03 kg/m².    Physical Exam   Constitutional: He is oriented to person, place, and time. He appears well-developed and well-nourished. No distress.   HENT:   Head: Normocephalic and atraumatic.   Right Ear: External ear normal.   Left Ear: External ear normal.   Nose: Nose normal.   Mouth/Throat: Oropharynx is clear and moist.   Eyes: Pupils are equal, round, and reactive to light. Conjunctivae and EOM are normal.   Neck: No thyromegaly present.   Cardiovascular: Normal rate, regular rhythm, normal heart sounds and intact distal pulses.   No murmur heard.  Pulmonary/Chest: Effort normal and breath sounds normal. No respiratory distress. He has no wheezes. He has no rales. He exhibits no tenderness.   Abdominal: Soft. Bowel sounds are normal. He exhibits no distension. There is no tenderness.   Musculoskeletal: He exhibits no edema.   Lymphadenopathy:     He has no cervical adenopathy.   Neurological: He is alert and oriented to person, place, and time.   Skin: Skin is warm and dry.   Psychiatric: He has a normal mood and affect.   Nursing note and vitals reviewed.      Assessment and Plan:     Hypertension, unspecified type  Not controlled - unclear why - did eat salty foods yesterday  Will take bp at home - bring back log   Will recheck in 4 weeks - will increase at next visit if continues to be elevated  Advised DASH diet and weight loss  rtc sooner if bp remains high at home   Need for vaccination against Streptococcus pneumoniae using pneumococcal conjugate vaccine 13  -     (In Office Administered) Pneumococcal Conjugate Vaccine (13 Valent) (IM)    Screening for colon cancer  -     Case request GI: COLONOSCOPY  -     sodium,potassium,mag sulfates (SUPREP BOWEL PREP KIT) 17.5-3.13-1.6 gram SolR; Take 177 mLs by mouth once daily. for 2 days  Dispense: 1 kit; Refill: 0    Hyperlipidemia,  unspecified hyperlipidemia type  Stable - continue with statin medication    Erectile dysfunction, unspecified erectile dysfunction type  Will increase viagra to 50 mg   Advised risk/side effects     Gout, unspecified cause, unspecified chronicity, unspecified site  As needed colcrys   Diet changes   Increase water intake    Other orders  -     sildenafil (VIAGRA) 50 MG tablet; Take 1 tablet (50 mg total) by mouth daily as needed for Erectile Dysfunction.  Dispense: 30 tablet; Refill: 2  -     colchicine (COLCRYS) 0.6 mg tablet; Take 2 pills at first sign of flare and then take one pill one hour later.  Dispense: 10 tablet; Refill: 2        Follow up in about 4 weeks (around 3/16/2020) for htn .    @@

## 2020-03-02 ENCOUNTER — PATIENT OUTREACH (OUTPATIENT)
Dept: ADMINISTRATIVE | Facility: HOSPITAL | Age: 65
End: 2020-03-02

## 2020-03-02 NOTE — PROGRESS NOTES
PreVisit Chart Audit Performed          Katelin WEI LPN Care Coordinator  Care Coordination Department  [facility]  [phone]\

## 2020-04-03 ENCOUNTER — TELEPHONE (OUTPATIENT)
Dept: ENDOSCOPY | Facility: HOSPITAL | Age: 65
End: 2020-04-03

## 2020-04-03 NOTE — TELEPHONE ENCOUNTER
Attempted to inform patient of pending cancellation of procedure with Dr. Morales on April 28, 2020 due to Covid-19. No answer, left message to return call- number provided.

## 2020-04-21 ENCOUNTER — TELEPHONE (OUTPATIENT)
Dept: ENDOSCOPY | Facility: HOSPITAL | Age: 65
End: 2020-04-21

## 2020-04-23 ENCOUNTER — TELEPHONE (OUTPATIENT)
Dept: ENDOSCOPY | Facility: HOSPITAL | Age: 65
End: 2020-04-23

## 2020-04-23 NOTE — TELEPHONE ENCOUNTER
Attempted to reschedule /cancel procedure, no answer. Left message on emergency contact's VM  to return call number provided. dw

## 2020-05-15 ENCOUNTER — TELEPHONE (OUTPATIENT)
Dept: ENDOSCOPY | Facility: HOSPITAL | Age: 65
End: 2020-05-15

## 2020-05-15 NOTE — TELEPHONE ENCOUNTER
Attempted to reschedule procedure with patient, no answer.  Left message to call office, number provided.

## 2020-06-08 ENCOUNTER — TELEPHONE (OUTPATIENT)
Dept: FAMILY MEDICINE | Facility: CLINIC | Age: 65
End: 2020-06-08

## 2020-06-08 NOTE — TELEPHONE ENCOUNTER
Pt called in wanting a appt. To be seen today. I told pt dr. soriano is out for 2 weeks. And our NP is out for 3 weeks. Pt understood. I advised him to go see [facility].

## 2020-12-17 RX ORDER — AMLODIPINE BESYLATE 10 MG/1
TABLET ORAL
Qty: 30 TABLET | Refills: 0 | Status: SHIPPED | OUTPATIENT
Start: 2020-12-17 | End: 2021-01-11

## 2020-12-17 NOTE — TELEPHONE ENCOUNTER
----- Message from [place] sent at 12/17/2020  9:28 AM CST -----  Regarding: rtn call  Contact: pt  Returning a call. [phone]

## 2021-01-11 RX ORDER — AMLODIPINE BESYLATE 10 MG/1
TABLET ORAL
Qty: 30 TABLET | Refills: 0 | Status: SHIPPED | OUTPATIENT
Start: 2021-01-11 | End: 2021-01-26 | Stop reason: SDUPTHER

## 2021-01-26 ENCOUNTER — LAB VISIT (OUTPATIENT)
Dept: LAB | Facility: HOSPITAL | Age: 66
End: 2021-01-26
Attending: REGISTERED NURSE
Payer: MEDICARE

## 2021-01-26 ENCOUNTER — OFFICE VISIT (OUTPATIENT)
Dept: FAMILY MEDICINE | Facility: CLINIC | Age: 66
End: 2021-01-26
Payer: MEDICARE

## 2021-01-26 VITALS
SYSTOLIC BLOOD PRESSURE: 130 MMHG | OXYGEN SATURATION: 97 % | WEIGHT: 226.19 LBS | TEMPERATURE: 98 F | DIASTOLIC BLOOD PRESSURE: 85 MMHG | BODY MASS INDEX: 29.98 KG/M2 | HEART RATE: 102 BPM | HEIGHT: 73 IN

## 2021-01-26 DIAGNOSIS — M10.9 GOUT, UNSPECIFIED CAUSE, UNSPECIFIED CHRONICITY, UNSPECIFIED SITE: ICD-10-CM

## 2021-01-26 DIAGNOSIS — Z00.00 ENCOUNTER FOR WELLNESS EXAMINATION: ICD-10-CM

## 2021-01-26 DIAGNOSIS — Z79.899 LONG TERM USE OF DRUG: ICD-10-CM

## 2021-01-26 DIAGNOSIS — R53.83 FATIGUE, UNSPECIFIED TYPE: ICD-10-CM

## 2021-01-26 DIAGNOSIS — Z12.5 SCREENING FOR PROSTATE CANCER: ICD-10-CM

## 2021-01-26 DIAGNOSIS — Z00.00 ENCOUNTER FOR WELLNESS EXAMINATION: Primary | ICD-10-CM

## 2021-01-26 DIAGNOSIS — I10 HYPERTENSION, UNSPECIFIED TYPE: ICD-10-CM

## 2021-01-26 DIAGNOSIS — R97.20 ELEVATED PSA: Primary | ICD-10-CM

## 2021-01-26 DIAGNOSIS — E78.5 HYPERLIPIDEMIA, UNSPECIFIED HYPERLIPIDEMIA TYPE: ICD-10-CM

## 2021-01-26 LAB
BACTERIA #/AREA URNS AUTO: ABNORMAL /HPF
BILIRUB UR QL STRIP: NEGATIVE
CHOLEST SERPL-MCNC: 208 MG/DL (ref 120–199)
CHOLEST/HDLC SERPL: 4.6 {RATIO} (ref 2–5)
CLARITY UR REFRACT.AUTO: ABNORMAL
COLOR UR AUTO: YELLOW
ERYTHROCYTE [DISTWIDTH] IN BLOOD BY AUTOMATED COUNT: 16.8 % (ref 11.5–14.5)
GLUCOSE UR QL STRIP: NEGATIVE
HCT VFR BLD AUTO: 44.2 % (ref 40–54)
HDLC SERPL-MCNC: 45 MG/DL (ref 40–75)
HDLC SERPL: 21.6 % (ref 20–50)
HGB BLD-MCNC: 13.1 G/DL (ref 14–18)
HGB UR QL STRIP: NEGATIVE
HYALINE CASTS UR QL AUTO: 19 /LPF
KETONES UR QL STRIP: NEGATIVE
LDLC SERPL CALC-MCNC: 127.8 MG/DL (ref 63–159)
LEUKOCYTE ESTERASE UR QL STRIP: ABNORMAL
MCH RBC QN AUTO: 27 PG (ref 27–31)
MCHC RBC AUTO-ENTMCNC: 29.6 G/DL (ref 32–36)
MCV RBC AUTO: 91 FL (ref 82–98)
MICROSCOPIC COMMENT: ABNORMAL
NITRITE UR QL STRIP: NEGATIVE
NONHDLC SERPL-MCNC: 163 MG/DL
PH UR STRIP: 5 [PH] (ref 5–8)
PLATELET # BLD AUTO: 286 K/UL (ref 150–350)
PMV BLD AUTO: 12.9 FL (ref 9.2–12.9)
PROT UR QL STRIP: NEGATIVE
RBC # BLD AUTO: 4.86 M/UL (ref 4.6–6.2)
RBC #/AREA URNS AUTO: 1 /HPF (ref 0–4)
SP GR UR STRIP: 1.02 (ref 1–1.03)
SQUAMOUS #/AREA URNS AUTO: 0 /HPF
TRIGL SERPL-MCNC: 176 MG/DL (ref 30–150)
TSH SERPL DL<=0.005 MIU/L-ACNC: 2.69 UIU/ML (ref 0.4–4)
URN SPEC COLLECT METH UR: ABNORMAL
WBC # BLD AUTO: 7.52 K/UL (ref 3.9–12.7)
WBC #/AREA URNS AUTO: 30 /HPF (ref 0–5)

## 2021-01-26 PROCEDURE — 1101F PT FALLS ASSESS-DOCD LE1/YR: CPT | Mod: CPTII,S$GLB,, | Performed by: FAMILY MEDICINE

## 2021-01-26 PROCEDURE — 36415 COLL VENOUS BLD VENIPUNCTURE: CPT | Mod: PO

## 2021-01-26 PROCEDURE — 99214 OFFICE O/P EST MOD 30 MIN: CPT | Mod: S$GLB,,, | Performed by: FAMILY MEDICINE

## 2021-01-26 PROCEDURE — 3079F PR MOST RECENT DIASTOLIC BLOOD PRESSURE 80-89 MM HG: ICD-10-PCS | Mod: CPTII,S$GLB,, | Performed by: FAMILY MEDICINE

## 2021-01-26 PROCEDURE — 85027 COMPLETE CBC AUTOMATED: CPT

## 2021-01-26 PROCEDURE — 3075F SYST BP GE 130 - 139MM HG: CPT | Mod: CPTII,S$GLB,, | Performed by: FAMILY MEDICINE

## 2021-01-26 PROCEDURE — 99999 PR PBB SHADOW E&M-EST. PATIENT-LVL III: ICD-10-PCS | Mod: PBBFAC,,, | Performed by: FAMILY MEDICINE

## 2021-01-26 PROCEDURE — 84153 ASSAY OF PSA TOTAL: CPT

## 2021-01-26 PROCEDURE — 99999 PR PBB SHADOW E&M-EST. PATIENT-LVL III: CPT | Mod: PBBFAC,,, | Performed by: FAMILY MEDICINE

## 2021-01-26 PROCEDURE — 3008F PR BODY MASS INDEX (BMI) DOCUMENTED: ICD-10-PCS | Mod: CPTII,S$GLB,, | Performed by: FAMILY MEDICINE

## 2021-01-26 PROCEDURE — 1126F AMNT PAIN NOTED NONE PRSNT: CPT | Mod: S$GLB,,, | Performed by: FAMILY MEDICINE

## 2021-01-26 PROCEDURE — 1126F PR PAIN SEVERITY QUANTIFIED, NO PAIN PRESENT: ICD-10-PCS | Mod: S$GLB,,, | Performed by: FAMILY MEDICINE

## 2021-01-26 PROCEDURE — 3075F PR MOST RECENT SYSTOLIC BLOOD PRESS GE 130-139MM HG: ICD-10-PCS | Mod: CPTII,S$GLB,, | Performed by: FAMILY MEDICINE

## 2021-01-26 PROCEDURE — 1101F PR PT FALLS ASSESS DOC 0-1 FALLS W/OUT INJ PAST YR: ICD-10-PCS | Mod: CPTII,S$GLB,, | Performed by: FAMILY MEDICINE

## 2021-01-26 PROCEDURE — 80053 COMPREHEN METABOLIC PANEL: CPT

## 2021-01-26 PROCEDURE — 3079F DIAST BP 80-89 MM HG: CPT | Mod: CPTII,S$GLB,, | Performed by: FAMILY MEDICINE

## 2021-01-26 PROCEDURE — 83036 HEMOGLOBIN GLYCOSYLATED A1C: CPT

## 2021-01-26 PROCEDURE — 80061 LIPID PANEL: CPT

## 2021-01-26 PROCEDURE — 3288F FALL RISK ASSESSMENT DOCD: CPT | Mod: CPTII,S$GLB,, | Performed by: FAMILY MEDICINE

## 2021-01-26 PROCEDURE — 84443 ASSAY THYROID STIM HORMONE: CPT

## 2021-01-26 PROCEDURE — 81001 URINALYSIS AUTO W/SCOPE: CPT

## 2021-01-26 PROCEDURE — 99214 PR OFFICE/OUTPT VISIT, EST, LEVL IV, 30-39 MIN: ICD-10-PCS | Mod: S$GLB,,, | Performed by: FAMILY MEDICINE

## 2021-01-26 PROCEDURE — 3288F PR FALLS RISK ASSESSMENT DOCUMENTED: ICD-10-PCS | Mod: CPTII,S$GLB,, | Performed by: FAMILY MEDICINE

## 2021-01-26 PROCEDURE — 3008F BODY MASS INDEX DOCD: CPT | Mod: CPTII,S$GLB,, | Performed by: FAMILY MEDICINE

## 2021-01-26 RX ORDER — ATORVASTATIN CALCIUM 10 MG/1
10 TABLET, FILM COATED ORAL DAILY
Qty: 90 TABLET | Refills: 3 | Status: SHIPPED | OUTPATIENT
Start: 2021-01-26 | End: 2022-05-31 | Stop reason: SDUPTHER

## 2021-01-26 RX ORDER — LISINOPRIL AND HYDROCHLOROTHIAZIDE 20; 25 MG/1; MG/1
1 TABLET ORAL DAILY
Qty: 90 TABLET | Refills: 3 | Status: SHIPPED | OUTPATIENT
Start: 2021-01-26 | End: 2022-05-31 | Stop reason: SDUPTHER

## 2021-01-26 RX ORDER — COLCHICINE 0.6 MG/1
TABLET ORAL
Qty: 10 TABLET | Refills: 2 | Status: SHIPPED | OUTPATIENT
Start: 2021-01-26 | End: 2022-05-31 | Stop reason: SDUPTHER

## 2021-01-26 RX ORDER — AMLODIPINE BESYLATE 10 MG/1
10 TABLET ORAL DAILY
Qty: 90 TABLET | Refills: 3 | Status: SHIPPED | OUTPATIENT
Start: 2021-01-26 | End: 2022-01-31

## 2021-01-27 LAB
ALBUMIN SERPL BCP-MCNC: 3.6 G/DL (ref 3.5–5.2)
ALP SERPL-CCNC: 96 U/L (ref 55–135)
ALT SERPL W/O P-5'-P-CCNC: 25 U/L (ref 10–44)
ANION GAP SERPL CALC-SCNC: 10 MMOL/L (ref 8–16)
AST SERPL-CCNC: 15 U/L (ref 10–40)
BILIRUB SERPL-MCNC: 0.5 MG/DL (ref 0.1–1)
BUN SERPL-MCNC: 19 MG/DL (ref 8–23)
CALCIUM SERPL-MCNC: 10.7 MG/DL (ref 8.7–10.5)
CHLORIDE SERPL-SCNC: 103 MMOL/L (ref 95–110)
CO2 SERPL-SCNC: 28 MMOL/L (ref 23–29)
COMPLEXED PSA SERPL-MCNC: 12.7 NG/ML (ref 0–4)
CREAT SERPL-MCNC: 1.2 MG/DL (ref 0.5–1.4)
EST. GFR  (AFRICAN AMERICAN): >60 ML/MIN/1.73 M^2
EST. GFR  (NON AFRICAN AMERICAN): >60 ML/MIN/1.73 M^2
ESTIMATED AVG GLUCOSE: 120 MG/DL (ref 68–131)
GLUCOSE SERPL-MCNC: 99 MG/DL (ref 70–110)
HBA1C MFR BLD HPLC: 5.8 % (ref 4–5.6)
POTASSIUM SERPL-SCNC: 4.1 MMOL/L (ref 3.5–5.1)
PROT SERPL-MCNC: 8.4 G/DL (ref 6–8.4)
SODIUM SERPL-SCNC: 141 MMOL/L (ref 136–145)

## 2021-01-28 RX ORDER — CIPROFLOXACIN 500 MG/1
500 TABLET ORAL 2 TIMES DAILY
Qty: 14 TABLET | Refills: 0 | Status: SHIPPED | OUTPATIENT
Start: 2021-01-28 | End: 2021-02-04

## 2021-04-07 ENCOUNTER — PATIENT OUTREACH (OUTPATIENT)
Dept: ADMINISTRATIVE | Facility: CLINIC | Age: 66
End: 2021-04-07

## 2021-04-21 PROBLEM — C7A.8 NEUROENDOCRINE CARCINOMA OF PANCREAS: Status: ACTIVE | Noted: 2021-04-21

## 2021-04-21 PROBLEM — D3A.8 NEUROENDOCRINE TUMOR OF PANCREAS: Status: ACTIVE | Noted: 2021-04-21

## 2021-12-13 ENCOUNTER — TELEPHONE (OUTPATIENT)
Dept: FAMILY MEDICINE | Facility: CLINIC | Age: 66
End: 2021-12-13
Payer: MEDICARE

## 2022-01-18 ENCOUNTER — PES CALL (OUTPATIENT)
Dept: ADMINISTRATIVE | Facility: CLINIC | Age: 67
End: 2022-01-18
Payer: MEDICARE

## 2022-02-02 DIAGNOSIS — I10 HYPERTENSION: ICD-10-CM

## 2022-02-03 ENCOUNTER — PATIENT OUTREACH (OUTPATIENT)
Dept: ADMINISTRATIVE | Facility: HOSPITAL | Age: 67
End: 2022-02-03
Payer: MEDICARE

## 2022-02-03 NOTE — PROGRESS NOTES
Working HTN Report       Called pt for home BP Reading, No answer, L/M        Katelin WEI LPN Care Coordinator  Care Coordination Department  [facility]  [phone]

## 2022-02-18 ENCOUNTER — PES CALL (OUTPATIENT)
Dept: ADMINISTRATIVE | Facility: CLINIC | Age: 67
End: 2022-02-18
Payer: MEDICARE

## 2022-04-29 ENCOUNTER — PATIENT OUTREACH (OUTPATIENT)
Dept: ADMINISTRATIVE | Facility: HOSPITAL | Age: 67
End: 2022-04-29
Payer: MEDICARE

## 2022-04-29 NOTE — PROGRESS NOTES
HTN Report: Attempting to contact pt to schedule overdue annual exam/HTN followup. Unable to reach patient at this time. Left voicemail.

## 2022-05-19 ENCOUNTER — PATIENT OUTREACH (OUTPATIENT)
Dept: ADMINISTRATIVE | Facility: HOSPITAL | Age: 67
End: 2022-05-19
Payer: MEDICARE

## 2022-05-24 ENCOUNTER — TELEPHONE (OUTPATIENT)
Dept: ADMINISTRATIVE | Facility: HOSPITAL | Age: 67
End: 2022-05-24
Payer: MEDICARE

## 2022-05-31 ENCOUNTER — LAB VISIT (OUTPATIENT)
Dept: LAB | Facility: HOSPITAL | Age: 67
End: 2022-05-31
Payer: MEDICARE

## 2022-05-31 ENCOUNTER — OFFICE VISIT (OUTPATIENT)
Dept: FAMILY MEDICINE | Facility: CLINIC | Age: 67
End: 2022-05-31
Payer: MEDICARE

## 2022-05-31 VITALS
SYSTOLIC BLOOD PRESSURE: 135 MMHG | HEART RATE: 100 BPM | DIASTOLIC BLOOD PRESSURE: 79 MMHG | WEIGHT: 245.13 LBS | HEIGHT: 73 IN | OXYGEN SATURATION: 99 % | BODY MASS INDEX: 32.49 KG/M2 | TEMPERATURE: 98 F

## 2022-05-31 DIAGNOSIS — I10 HYPERTENSION, UNSPECIFIED TYPE: ICD-10-CM

## 2022-05-31 DIAGNOSIS — Z00.00 ENCOUNTER FOR WELLNESS EXAMINATION: ICD-10-CM

## 2022-05-31 DIAGNOSIS — M10.9 GOUT, UNSPECIFIED CAUSE, UNSPECIFIED CHRONICITY, UNSPECIFIED SITE: ICD-10-CM

## 2022-05-31 DIAGNOSIS — C25.9 MALIGNANT NEOPLASM OF PANCREAS, UNSPECIFIED LOCATION OF MALIGNANCY: ICD-10-CM

## 2022-05-31 DIAGNOSIS — Z23 NEED FOR PNEUMOCOCCAL VACCINATION: ICD-10-CM

## 2022-05-31 DIAGNOSIS — Z12.5 SCREENING FOR PROSTATE CANCER: ICD-10-CM

## 2022-05-31 DIAGNOSIS — E78.5 HYPERLIPIDEMIA, UNSPECIFIED HYPERLIPIDEMIA TYPE: ICD-10-CM

## 2022-05-31 DIAGNOSIS — Z00.00 ENCOUNTER FOR WELLNESS EXAMINATION: Primary | ICD-10-CM

## 2022-05-31 LAB
ALBUMIN SERPL BCP-MCNC: 3.7 G/DL (ref 3.5–5.2)
ALP SERPL-CCNC: 124 U/L (ref 55–135)
ALT SERPL W/O P-5'-P-CCNC: 21 U/L (ref 10–44)
ANION GAP SERPL CALC-SCNC: 10 MMOL/L (ref 8–16)
AST SERPL-CCNC: 14 U/L (ref 10–40)
BILIRUB SERPL-MCNC: 0.3 MG/DL (ref 0.1–1)
BUN SERPL-MCNC: 18 MG/DL (ref 8–23)
CALCIUM SERPL-MCNC: 9.5 MG/DL (ref 8.7–10.5)
CHLORIDE SERPL-SCNC: 106 MMOL/L (ref 95–110)
CHOLEST SERPL-MCNC: 146 MG/DL (ref 120–199)
CHOLEST/HDLC SERPL: 3.9 {RATIO} (ref 2–5)
CO2 SERPL-SCNC: 25 MMOL/L (ref 23–29)
COMPLEXED PSA SERPL-MCNC: 16.4 NG/ML (ref 0–4)
CREAT SERPL-MCNC: 1.3 MG/DL (ref 0.5–1.4)
ERYTHROCYTE [DISTWIDTH] IN BLOOD BY AUTOMATED COUNT: 14.8 % (ref 11.5–14.5)
EST. GFR  (AFRICAN AMERICAN): >60 ML/MIN/1.73 M^2
EST. GFR  (NON AFRICAN AMERICAN): 56.5 ML/MIN/1.73 M^2
ESTIMATED AVG GLUCOSE: 166 MG/DL (ref 68–131)
GLUCOSE SERPL-MCNC: 131 MG/DL (ref 70–110)
HBA1C MFR BLD: 7.4 % (ref 4–5.6)
HCT VFR BLD AUTO: 40.9 % (ref 40–54)
HDLC SERPL-MCNC: 37 MG/DL (ref 40–75)
HDLC SERPL: 25.3 % (ref 20–50)
HGB BLD-MCNC: 12.3 G/DL (ref 14–18)
LDLC SERPL CALC-MCNC: 89 MG/DL (ref 63–159)
MCH RBC QN AUTO: 27.5 PG (ref 27–31)
MCHC RBC AUTO-ENTMCNC: 30.1 G/DL (ref 32–36)
MCV RBC AUTO: 92 FL (ref 82–98)
NONHDLC SERPL-MCNC: 109 MG/DL
PLATELET # BLD AUTO: 260 K/UL (ref 150–450)
PMV BLD AUTO: 12.8 FL (ref 9.2–12.9)
POTASSIUM SERPL-SCNC: 4.4 MMOL/L (ref 3.5–5.1)
PROT SERPL-MCNC: 7.9 G/DL (ref 6–8.4)
RBC # BLD AUTO: 4.47 M/UL (ref 4.6–6.2)
SODIUM SERPL-SCNC: 141 MMOL/L (ref 136–145)
TRIGL SERPL-MCNC: 100 MG/DL (ref 30–150)
WBC # BLD AUTO: 6.62 K/UL (ref 3.9–12.7)

## 2022-05-31 PROCEDURE — 3288F FALL RISK ASSESSMENT DOCD: CPT | Mod: CPTII,S$GLB,, | Performed by: FAMILY MEDICINE

## 2022-05-31 PROCEDURE — 1101F PR PT FALLS ASSESS DOC 0-1 FALLS W/OUT INJ PAST YR: ICD-10-PCS | Mod: CPTII,S$GLB,, | Performed by: FAMILY MEDICINE

## 2022-05-31 PROCEDURE — 99999 PR PBB SHADOW E&M-EST. PATIENT-LVL III: CPT | Mod: PBBFAC,,, | Performed by: FAMILY MEDICINE

## 2022-05-31 PROCEDURE — 3008F BODY MASS INDEX DOCD: CPT | Mod: CPTII,S$GLB,, | Performed by: FAMILY MEDICINE

## 2022-05-31 PROCEDURE — 3078F DIAST BP <80 MM HG: CPT | Mod: CPTII,S$GLB,, | Performed by: FAMILY MEDICINE

## 2022-05-31 PROCEDURE — 3078F PR MOST RECENT DIASTOLIC BLOOD PRESSURE < 80 MM HG: ICD-10-PCS | Mod: CPTII,S$GLB,, | Performed by: FAMILY MEDICINE

## 2022-05-31 PROCEDURE — 90677 PCV20 VACCINE IM: CPT | Mod: S$GLB,,, | Performed by: FAMILY MEDICINE

## 2022-05-31 PROCEDURE — 1159F PR MEDICATION LIST DOCUMENTED IN MEDICAL RECORD: ICD-10-PCS | Mod: CPTII,S$GLB,, | Performed by: FAMILY MEDICINE

## 2022-05-31 PROCEDURE — 36415 COLL VENOUS BLD VENIPUNCTURE: CPT | Mod: PO | Performed by: FAMILY MEDICINE

## 2022-05-31 PROCEDURE — 99397 PR PREVENTIVE VISIT,EST,65 & OVER: ICD-10-PCS | Mod: GY,S$GLB,, | Performed by: FAMILY MEDICINE

## 2022-05-31 PROCEDURE — 3008F PR BODY MASS INDEX (BMI) DOCUMENTED: ICD-10-PCS | Mod: CPTII,S$GLB,, | Performed by: FAMILY MEDICINE

## 2022-05-31 PROCEDURE — 84153 ASSAY OF PSA TOTAL: CPT | Performed by: FAMILY MEDICINE

## 2022-05-31 PROCEDURE — 1125F AMNT PAIN NOTED PAIN PRSNT: CPT | Mod: CPTII,S$GLB,, | Performed by: FAMILY MEDICINE

## 2022-05-31 PROCEDURE — 99499 RISK ADDL DX/OHS AUDIT: ICD-10-PCS | Mod: S$GLB,,, | Performed by: FAMILY MEDICINE

## 2022-05-31 PROCEDURE — 99397 PER PM REEVAL EST PAT 65+ YR: CPT | Mod: GY,S$GLB,, | Performed by: FAMILY MEDICINE

## 2022-05-31 PROCEDURE — 3288F PR FALLS RISK ASSESSMENT DOCUMENTED: ICD-10-PCS | Mod: CPTII,S$GLB,, | Performed by: FAMILY MEDICINE

## 2022-05-31 PROCEDURE — 85027 COMPLETE CBC AUTOMATED: CPT | Performed by: FAMILY MEDICINE

## 2022-05-31 PROCEDURE — 1125F PR PAIN SEVERITY QUANTIFIED, PAIN PRESENT: ICD-10-PCS | Mod: CPTII,S$GLB,, | Performed by: FAMILY MEDICINE

## 2022-05-31 PROCEDURE — 4010F ACE/ARB THERAPY RXD/TAKEN: CPT | Mod: CPTII,S$GLB,, | Performed by: FAMILY MEDICINE

## 2022-05-31 PROCEDURE — G0009 ADMIN PNEUMOCOCCAL VACCINE: HCPCS | Mod: S$GLB,,, | Performed by: FAMILY MEDICINE

## 2022-05-31 PROCEDURE — 1159F MED LIST DOCD IN RCRD: CPT | Mod: CPTII,S$GLB,, | Performed by: FAMILY MEDICINE

## 2022-05-31 PROCEDURE — 80061 LIPID PANEL: CPT | Performed by: FAMILY MEDICINE

## 2022-05-31 PROCEDURE — 99499 UNLISTED E&M SERVICE: CPT | Mod: S$GLB,,, | Performed by: FAMILY MEDICINE

## 2022-05-31 PROCEDURE — 1101F PT FALLS ASSESS-DOCD LE1/YR: CPT | Mod: CPTII,S$GLB,, | Performed by: FAMILY MEDICINE

## 2022-05-31 PROCEDURE — 3075F PR MOST RECENT SYSTOLIC BLOOD PRESS GE 130-139MM HG: ICD-10-PCS | Mod: CPTII,S$GLB,, | Performed by: FAMILY MEDICINE

## 2022-05-31 PROCEDURE — 99999 PR PBB SHADOW E&M-EST. PATIENT-LVL III: ICD-10-PCS | Mod: PBBFAC,,, | Performed by: FAMILY MEDICINE

## 2022-05-31 PROCEDURE — 80053 COMPREHEN METABOLIC PANEL: CPT | Performed by: FAMILY MEDICINE

## 2022-05-31 PROCEDURE — 3075F SYST BP GE 130 - 139MM HG: CPT | Mod: CPTII,S$GLB,, | Performed by: FAMILY MEDICINE

## 2022-05-31 PROCEDURE — 83036 HEMOGLOBIN GLYCOSYLATED A1C: CPT | Performed by: FAMILY MEDICINE

## 2022-05-31 PROCEDURE — G0009 PNEUMOCOCCAL CONJUGATE VACCINE 20-VALENT: ICD-10-PCS | Mod: S$GLB,,, | Performed by: FAMILY MEDICINE

## 2022-05-31 PROCEDURE — 4010F PR ACE/ARB THEARPY RXD/TAKEN: ICD-10-PCS | Mod: CPTII,S$GLB,, | Performed by: FAMILY MEDICINE

## 2022-05-31 PROCEDURE — 90677 PNEUMOCOCCAL CONJUGATE VACCINE 20-VALENT: ICD-10-PCS | Mod: S$GLB,,, | Performed by: FAMILY MEDICINE

## 2022-05-31 RX ORDER — ALLOPURINOL 100 MG/1
100 TABLET ORAL DAILY
Qty: 90 TABLET | Refills: 3 | Status: SHIPPED | OUTPATIENT
Start: 2022-05-31 | End: 2023-05-24

## 2022-05-31 RX ORDER — AMLODIPINE BESYLATE 10 MG/1
10 TABLET ORAL DAILY
Qty: 90 TABLET | Refills: 3 | Status: SHIPPED | OUTPATIENT
Start: 2022-05-31 | End: 2023-06-30 | Stop reason: SDUPTHER

## 2022-05-31 RX ORDER — COLCHICINE 0.6 MG/1
TABLET ORAL
Qty: 30 TABLET | Refills: 2 | Status: SHIPPED | OUTPATIENT
Start: 2022-05-31 | End: 2022-06-03

## 2022-05-31 RX ORDER — LISINOPRIL AND HYDROCHLOROTHIAZIDE 20; 25 MG/1; MG/1
1 TABLET ORAL DAILY
Qty: 90 TABLET | Refills: 3 | Status: SHIPPED | OUTPATIENT
Start: 2022-05-31 | End: 2023-05-24

## 2022-05-31 RX ORDER — ATORVASTATIN CALCIUM 10 MG/1
10 TABLET, FILM COATED ORAL DAILY
Qty: 90 TABLET | Refills: 3 | Status: SHIPPED | OUTPATIENT
Start: 2022-05-31 | End: 2023-05-24

## 2022-05-31 NOTE — PROGRESS NOTES
Subjective:      Patient ID: Demario Live is a 66 y.o. male.    Chief Complaint: Annual Exam    HPI    Patient with pmhx of HLD, Gout, HTN and pancreatic cancer (diagnosed in 2021) here today for wellness visit     Pancreatic cancer following Dr. Power - sees him every month for injection. Recent PET scan per patient ok.   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 TAKE DAILY TO PREVENT GOUT   Current Medications    ASPIRIN (ECOTRIN) 81 MG EC TABLET    Take 81 mg by mouth once daily.    SILDENAFIL (VIAGRA) 50 MG TABLET    TAKE ONE TABLET BY MOUTH ONE TIME DAILY as needed for erectile dysfunction   Changed and/or Refilled Medications    Modified Medication Previous Medication    AMLODIPINE (NORVASC) 10 MG TABLET amLODIPine (NORVASC) 10 MG tablet       Take 1 tablet (10 mg total) by mouth once daily.    TAKE 1 TABLET BY MOUTH EVERY DAY    ATORVASTATIN (LIPITOR) 10 MG TABLET atorvastatin (LIPITOR) 10 MG tablet       Take 1  tablet (10 mg total) by mouth once daily.    Take 1 tablet (10 mg total) by mouth once daily.    COLCHICINE (COLCRYS) 0.6 MG TABLET colchicine (COLCRYS) 0.6 mg tablet       TAKE 2 TABLETS AT FIRST SIGN OF FLARE AND THEN TAKE ONE TABLET ONE HOUR LATER. Then take 1 tablet daily until pain resolves    TAKE 2 TABLETS AT FIRST SIGN OF FLARE AND THEN TAKE ONE TABLET ONE HOUR LATER.    LISINOPRIL-HYDROCHLOROTHIAZIDE (PRINZIDE,ZESTORETIC) 20-25 MG TAB lisinopriL-hydrochlorothiazide (PRINZIDE,ZESTORETIC) 20-25 mg Tab       Take 1 tablet by mouth once daily.    Take 1 tablet by mouth once daily.       Review of patient's allergies indicates:  No Known Allergies    Review of Systems   Constitutional: Negative for fever.   HENT: Negative for congestion.    Eyes: Negative for blurred vision.   Respiratory: Negative for shortness of breath.    Cardiovascular: Negative for chest pain and leg swelling.   Gastrointestinal: Negative for abdominal pain, constipation and diarrhea.   Genitourinary: Negative for dysuria.   Skin: Negative for rash.   Neurological: Negative for headaches.       Objective:     Vitals:    05/31/22 0717   BP: 135/79   Pulse: 100   Temp: 97.8 °F (36.6 °C)     Body mass index is 32.34 kg/m².    Physical Exam  Vitals and nursing note reviewed.   Constitutional:       General: He is not in acute distress.     Appearance: He is well-developed.   HENT:      Head: Normocephalic and atraumatic.      Right Ear: External ear normal.      Left Ear: External ear normal.      Nose: Nose normal.   Eyes:      Conjunctiva/sclera: Conjunctivae normal.      Pupils: Pupils are equal, round, and reactive to light.   Neck:      Thyroid: No thyromegaly.   Cardiovascular:      Rate and Rhythm: Normal rate and regular rhythm.      Heart sounds: Normal heart sounds. No murmur heard.  Pulmonary:      Effort: Pulmonary effort is normal. No respiratory distress.      Breath sounds: Normal breath sounds. No wheezing or rales.   Chest:       Chest wall: No tenderness.   Abdominal:      General: Bowel sounds are normal. There is no distension.      Palpations: Abdomen is soft.      Tenderness: There is no abdominal tenderness.   Lymphadenopathy:      Cervical: No cervical adenopathy.   Skin:     General: Skin is warm and dry.   Neurological:      Mental Status: He is alert and oriented to person, place, and time.         Assessment and Plan:     Encounter for wellness examination  -     CBC Without Differential; Future; Expected date: 05/31/2022  -     Comprehensive Metabolic Panel; Future; Expected date: 05/31/2022  -     Hemoglobin A1C; Future; Expected date: 05/31/2022  -     Lipid Panel; Future; Expected date: 05/31/2022  -     PSA, Screening; Future; Expected date: 05/31/2022  Age appropriate counseling    Hyperlipidemia, unspecified hyperlipidemia type  -     Lipid Panel; Future; Expected date: 05/31/2022    Hypertension, unspecified type  -     CBC Without Differential; Future; Expected date: 05/31/2022  -     Comprehensive Metabolic Panel; Future; Expected date: 05/31/2022  Blood pressure controlled at today's visit   Continue with current medications   Ambulatory logs of blood pressure readings recommended   DASH diet, weight loss, exercise     Screening for prostate cancer  -     PSA, Screening; Future; Expected date: 05/31/2022  Declines following up with urology - notes that he has had elevated PSA in the past and biopsy with no cancer. Does not want to follow up.     Need for pneumococcal vaccination  -     Pneumococcal Conjugate Vaccine (20 Valent) (IM)    Malignant neoplasm of pancreas, unspecified location of malignancy  -     CBC Without Differential; Future; Expected date: 05/31/2022  -     Comprehensive Metabolic Panel; Future; Expected date: 05/31/2022  -     Pneumococcal Conjugate Vaccine (20 Valent) (IM)  Following heme/onc      Follow up if symptoms worsen or fail to improve.

## 2022-06-01 ENCOUNTER — PES CALL (OUTPATIENT)
Dept: ADMINISTRATIVE | Facility: CLINIC | Age: 67
End: 2022-06-01
Payer: MEDICARE

## 2022-06-08 ENCOUNTER — PES CALL (OUTPATIENT)
Dept: ADMINISTRATIVE | Facility: CLINIC | Age: 67
End: 2022-06-08
Payer: MEDICARE

## 2022-06-22 ENCOUNTER — PES CALL (OUTPATIENT)
Dept: ADMINISTRATIVE | Facility: CLINIC | Age: 67
End: 2022-06-22
Payer: MEDICARE

## 2022-10-04 ENCOUNTER — PES CALL (OUTPATIENT)
Dept: ADMINISTRATIVE | Facility: CLINIC | Age: 67
End: 2022-10-04
Payer: MEDICARE

## 2022-10-12 ENCOUNTER — TELEPHONE (OUTPATIENT)
Dept: ADMINISTRATIVE | Facility: HOSPITAL | Age: 67
End: 2022-10-12
Payer: MEDICARE

## 2022-10-17 ENCOUNTER — TELEPHONE (OUTPATIENT)
Dept: ADMINISTRATIVE | Facility: HOSPITAL | Age: 67
End: 2022-10-17
Payer: MEDICARE

## 2022-10-18 ENCOUNTER — EXTERNAL HOSPITAL ADMISSION (OUTPATIENT)
Dept: ADMINISTRATIVE | Facility: CLINIC | Age: 67
End: 2022-10-18
Payer: MEDICARE

## 2022-10-18 ENCOUNTER — PATIENT OUTREACH (OUTPATIENT)
Dept: ADMINISTRATIVE | Facility: CLINIC | Age: 67
End: 2022-10-18
Payer: MEDICARE

## 2022-10-18 NOTE — PROGRESS NOTES
C3 nurse spoke with Demario Live for a TCC post hospital discharge follow up call. The patient does not have a scheduled HOSFU appointment with Sheeba Gonsales MD within 5-7 days post hospital discharge date 10/14/22. C3 nurse was unable to schedule HOSFU appointment in [facility].    Message sent to PCP staff requesting they contact patient and schedule follow up appointment.

## 2022-10-26 NOTE — TELEPHONE ENCOUNTER
See if you can get patient on our schedule. Will have to over rid one of the daily change slots. Please advise judi I asked for this.

## 2022-11-08 ENCOUNTER — PES CALL (OUTPATIENT)
Dept: ADMINISTRATIVE | Facility: CLINIC | Age: 67
End: 2022-11-08
Payer: MEDICARE

## 2023-01-19 ENCOUNTER — TELEPHONE (OUTPATIENT)
Dept: FAMILY MEDICINE | Facility: CLINIC | Age: 68
End: 2023-01-19

## 2023-01-19 NOTE — TELEPHONE ENCOUNTER
----- Message from Sissy Sanchez sent at 1/19/2023  2:46 PM CST -----    Type:  Sooner Apoointment Request    Caller is requesting a sooner appointment.  Caller declined first available appointment listed below.  Caller will not accept being placed on the waitlist and is requesting a message be sent to doctor.  Name of Caller:patient  When is the first available appointment?none  Symptoms:pre op clearance  Would the patient rather a call back or a response via [employer]? call  Best Call Back Number:[phone]  Additional Information:

## 2023-01-20 NOTE — LETTER
September 13, 2019      Sheeba Gonsales MD  [address]  [facility] [address]           [facility]  [address] [place] [address]  [place] [address]  Phone: [phone]  Fax: [phone]          Patient: Demario Live   MR Number: [id number]   YOB: 1955   Date of Visit: 9/13/2019       Dear Dr. Sheeba Gonsales:    Thank you for referring Demario Live to me for evaluation. Attached you will find relevant portions of my assessment and plan of care.    If you have questions, please do not hesitate to call me. I look forward to following Demario Live along with you.    Sincerely,    Anny Haley PA-C    Enclosure  CC:  No Recipients    If you would like to receive this communication electronically, please contact [email or web address] or [phone] to request more information on [employer] access.    For providers and/or their staff who would like to refer a patient to [facility], please contact us through our one-stop-shop provider referral line, [facility], at [phone].    If you feel you have received this communication in error or would no longer like to receive these types of communications, please e-mail [email or web address]         
received phone call from patient, patient stating per patient account, can see where pa request for dr headley has been approved.  
60

## 2023-01-24 ENCOUNTER — OFFICE VISIT (OUTPATIENT)
Dept: INTERNAL MEDICINE | Facility: CLINIC | Age: 68
End: 2023-01-24
Payer: MEDICARE

## 2023-01-24 ENCOUNTER — OFFICE VISIT (OUTPATIENT)
Dept: CARDIOLOGY | Facility: CLINIC | Age: 68
End: 2023-01-24
Payer: MEDICARE

## 2023-01-24 ENCOUNTER — HOSPITAL ENCOUNTER (OUTPATIENT)
Dept: RADIOLOGY | Facility: HOSPITAL | Age: 68
Discharge: HOME | End: 2023-01-24
Payer: MEDICARE

## 2023-01-24 VITALS
BODY MASS INDEX: 30.57 KG/M2 | OXYGEN SATURATION: 96 % | SYSTOLIC BLOOD PRESSURE: 132 MMHG | HEART RATE: 103 BPM | HEIGHT: 73 IN | WEIGHT: 230.63 LBS | DIASTOLIC BLOOD PRESSURE: 78 MMHG | TEMPERATURE: 98 F

## 2023-01-24 VITALS
BODY MASS INDEX: 30.57 KG/M2 | HEIGHT: 73 IN | SYSTOLIC BLOOD PRESSURE: 132 MMHG | DIASTOLIC BLOOD PRESSURE: 78 MMHG | WEIGHT: 230.63 LBS | HEART RATE: 103 BPM

## 2023-01-24 DIAGNOSIS — R94.31 ABNORMAL EKG: ICD-10-CM

## 2023-01-24 DIAGNOSIS — C7A.8 NEUROENDOCRINE CARCINOMA OF PANCREAS: ICD-10-CM

## 2023-01-24 DIAGNOSIS — N52.9 ERECTILE DYSFUNCTION, UNSPECIFIED ERECTILE DYSFUNCTION TYPE: ICD-10-CM

## 2023-01-24 DIAGNOSIS — R73.03 PREDIABETES: ICD-10-CM

## 2023-01-24 DIAGNOSIS — Z01.818 PRE-OPERATIVE CLEARANCE: ICD-10-CM

## 2023-01-24 DIAGNOSIS — E66.9 OBESITY (BMI 30-39.9): ICD-10-CM

## 2023-01-24 DIAGNOSIS — Z01.812 PRE-OPERATIVE LABORATORY EXAMINATION: ICD-10-CM

## 2023-01-24 DIAGNOSIS — C7A.098 MALIGNANT CARCINOID TUMORS OF OTHER SITES: ICD-10-CM

## 2023-01-24 DIAGNOSIS — I10 HYPERTENSION, UNSPECIFIED TYPE: ICD-10-CM

## 2023-01-24 DIAGNOSIS — E78.5 HYPERLIPIDEMIA, UNSPECIFIED HYPERLIPIDEMIA TYPE: ICD-10-CM

## 2023-01-24 DIAGNOSIS — Z01.818 PRE-OPERATIVE CLEARANCE: Primary | ICD-10-CM

## 2023-01-24 DIAGNOSIS — I10 HYPERTENSION, UNSPECIFIED TYPE: Primary | ICD-10-CM

## 2023-01-24 PROCEDURE — 1160F PR REVIEW ALL MEDS BY PRESCRIBER/CLIN PHARMACIST DOCUMENTED: ICD-10-PCS | Mod: CPTII,S$GLB,,

## 2023-01-24 PROCEDURE — 3008F PR BODY MASS INDEX (BMI) DOCUMENTED: ICD-10-PCS | Mod: CPTII,S$GLB,, | Performed by: INTERNAL MEDICINE

## 2023-01-24 PROCEDURE — 99999 PR PBB SHADOW E&M-EST. PATIENT-LVL IV: CPT | Mod: PBBFAC,,,

## 2023-01-24 PROCEDURE — 1159F PR MEDICATION LIST DOCUMENTED IN MEDICAL RECORD: ICD-10-PCS | Mod: CPTII,S$GLB,,

## 2023-01-24 PROCEDURE — 3078F DIAST BP <80 MM HG: CPT | Mod: CPTII,S$GLB,,

## 2023-01-24 PROCEDURE — 93005 EKG 12-LEAD: ICD-10-PCS | Mod: S$GLB,,,

## 2023-01-24 PROCEDURE — 3078F PR MOST RECENT DIASTOLIC BLOOD PRESSURE < 80 MM HG: ICD-10-PCS | Mod: CPTII,S$GLB,,

## 2023-01-24 PROCEDURE — 71046 XR CHEST PA AND LATERAL: ICD-10-PCS | Mod: 26,,, | Performed by: RADIOLOGY

## 2023-01-24 PROCEDURE — 1126F PR PAIN SEVERITY QUANTIFIED, NO PAIN PRESENT: ICD-10-PCS | Mod: CPTII,S$GLB,,

## 2023-01-24 PROCEDURE — 3075F SYST BP GE 130 - 139MM HG: CPT | Mod: CPTII,S$GLB,,

## 2023-01-24 PROCEDURE — 3288F FALL RISK ASSESSMENT DOCD: CPT | Mod: CPTII,S$GLB,,

## 2023-01-24 PROCEDURE — 3008F BODY MASS INDEX DOCD: CPT | Mod: CPTII,S$GLB,, | Performed by: INTERNAL MEDICINE

## 2023-01-24 PROCEDURE — 99999 PR PBB SHADOW E&M-EST. PATIENT-LVL IV: ICD-10-PCS | Mod: PBBFAC,,,

## 2023-01-24 PROCEDURE — 93010 ELECTROCARDIOGRAM REPORT: CPT | Mod: S$GLB,,, | Performed by: INTERNAL MEDICINE

## 2023-01-24 PROCEDURE — 3288F PR FALLS RISK ASSESSMENT DOCUMENTED: ICD-10-PCS | Mod: CPTII,S$GLB,,

## 2023-01-24 PROCEDURE — 1159F MED LIST DOCD IN RCRD: CPT | Mod: CPTII,S$GLB,,

## 2023-01-24 PROCEDURE — 3078F DIAST BP <80 MM HG: CPT | Mod: CPTII,S$GLB,, | Performed by: INTERNAL MEDICINE

## 2023-01-24 PROCEDURE — 1160F RVW MEDS BY RX/DR IN RCRD: CPT | Mod: CPTII,S$GLB,,

## 2023-01-24 PROCEDURE — 93005 ELECTROCARDIOGRAM TRACING: CPT | Mod: S$GLB,,,

## 2023-01-24 PROCEDURE — 99999 PR PBB SHADOW E&M-EST. PATIENT-LVL II: ICD-10-PCS | Mod: PBBFAC,,, | Performed by: INTERNAL MEDICINE

## 2023-01-24 PROCEDURE — 99205 OFFICE O/P NEW HI 60 MIN: CPT | Mod: 25,S$GLB,, | Performed by: INTERNAL MEDICINE

## 2023-01-24 PROCEDURE — 1126F AMNT PAIN NOTED NONE PRSNT: CPT | Mod: CPTII,S$GLB,,

## 2023-01-24 PROCEDURE — 3075F SYST BP GE 130 - 139MM HG: CPT | Mod: CPTII,S$GLB,, | Performed by: INTERNAL MEDICINE

## 2023-01-24 PROCEDURE — 3075F PR MOST RECENT SYSTOLIC BLOOD PRESS GE 130-139MM HG: ICD-10-PCS | Mod: CPTII,S$GLB,, | Performed by: INTERNAL MEDICINE

## 2023-01-24 PROCEDURE — 1101F PR PT FALLS ASSESS DOC 0-1 FALLS W/OUT INJ PAST YR: ICD-10-PCS | Mod: CPTII,S$GLB,,

## 2023-01-24 PROCEDURE — 3008F PR BODY MASS INDEX (BMI) DOCUMENTED: ICD-10-PCS | Mod: CPTII,S$GLB,,

## 2023-01-24 PROCEDURE — 71046 X-RAY EXAM CHEST 2 VIEWS: CPT | Mod: TC

## 2023-01-24 PROCEDURE — 99214 PR OFFICE/OUTPT VISIT, EST, LEVL IV, 30-39 MIN: ICD-10-PCS | Mod: S$GLB,,,

## 2023-01-24 PROCEDURE — 3008F BODY MASS INDEX DOCD: CPT | Mod: CPTII,S$GLB,,

## 2023-01-24 PROCEDURE — 3075F PR MOST RECENT SYSTOLIC BLOOD PRESS GE 130-139MM HG: ICD-10-PCS | Mod: CPTII,S$GLB,,

## 2023-01-24 PROCEDURE — 1126F PR PAIN SEVERITY QUANTIFIED, NO PAIN PRESENT: ICD-10-PCS | Mod: CPTII,S$GLB,, | Performed by: INTERNAL MEDICINE

## 2023-01-24 PROCEDURE — 1126F AMNT PAIN NOTED NONE PRSNT: CPT | Mod: CPTII,S$GLB,, | Performed by: INTERNAL MEDICINE

## 2023-01-24 PROCEDURE — 99205 PR OFFICE/OUTPT VISIT, NEW, LEVL V, 60-74 MIN: ICD-10-PCS | Mod: 25,S$GLB,, | Performed by: INTERNAL MEDICINE

## 2023-01-24 PROCEDURE — 99214 OFFICE O/P EST MOD 30 MIN: CPT | Mod: S$GLB,,,

## 2023-01-24 PROCEDURE — 3078F PR MOST RECENT DIASTOLIC BLOOD PRESSURE < 80 MM HG: ICD-10-PCS | Mod: CPTII,S$GLB,, | Performed by: INTERNAL MEDICINE

## 2023-01-24 PROCEDURE — 1101F PT FALLS ASSESS-DOCD LE1/YR: CPT | Mod: CPTII,S$GLB,,

## 2023-01-24 PROCEDURE — 93010 EKG 12-LEAD: ICD-10-PCS | Mod: S$GLB,,, | Performed by: INTERNAL MEDICINE

## 2023-01-24 PROCEDURE — 71046 X-RAY EXAM CHEST 2 VIEWS: CPT | Mod: 26,,, | Performed by: RADIOLOGY

## 2023-01-24 PROCEDURE — 99999 PR PBB SHADOW E&M-EST. PATIENT-LVL II: CPT | Mod: PBBFAC,,, | Performed by: INTERNAL MEDICINE

## 2023-01-24 NOTE — PROGRESS NOTES
Subjective:   Patient ID:  Demario Live is a 67 y.o. male who presents for evaluation of No chief complaint on file.      HPI  68 yo male , hx of pancreatic cancer follows with [facility] takes an injection once a month ?, htn hld   Controlled   Comes in for preop evaluation   He is active, walks , no limited   No chest pain, no dong, no palpitations   See daphnie   Going for prostate surgery feb 14th     Past Medical History:   Diagnosis Date    HLD (hyperlipidemia) 2/15/2019    Hypertension     Neuroendocrine carcinoma of pancreas 4/21/2021       Past Surgical History:   Procedure Laterality Date    LIPOMA RESECTION         Social History     Tobacco Use    Smoking status: Never    Smokeless tobacco: Never   Substance Use Topics    Alcohol use: Yes     Comment: occasionally/very rare     Drug use: No       Family History   Problem Relation Age of Onset    Stroke Mother     Heart disease Father        Review of Systems   Constitutional: Negative for fever and malaise/fatigue.   HENT:  Negative for sore throat.    Eyes:  Negative for blurred vision.   Cardiovascular:  Negative for chest pain, claudication, cyanosis, dyspnea on exertion, irregular heartbeat, leg swelling, near-syncope, orthopnea, palpitations, paroxysmal nocturnal dyspnea and syncope.   Respiratory:  Negative for cough and hemoptysis.    Hematologic/Lymphatic: Negative for bleeding problem.   Skin:  Negative for rash.   Musculoskeletal:  Negative for falls.   Gastrointestinal:  Negative for abdominal pain.   Genitourinary: Negative.    Neurological: Negative.    Psychiatric/Behavioral:  Negative for altered mental status and substance abuse.      Current Outpatient Medications on File Prior to Visit   Medication Sig    allopurinoL (ZYLOPRIM) 100 MG tablet Take 1 tablet (100 mg total) by mouth once daily. TAKE DAILY TO PREVENT GOUT    amLODIPine (NORVASC) 10 MG tablet Take 1 tablet (10 mg total) by mouth once daily.    aspirin (ECOTRIN) 81 MG EC tablet  Take 81 mg by mouth once daily.    atorvastatin (LIPITOR) 10 MG tablet Take 1 tablet (10 mg total) by mouth once daily.    colchicine (COLCRYS) 0.6 mg tablet TAKE 2 TABS BY MOUTH AT FIRST SIGN OF FLARE AND THEN 1 TAB 1 HOUR LATER THEN 1 TAB A DAY UNTIL RESOLVED    lisinopriL-hydrochlorothiazide (PRINZIDE,ZESTORETIC) 20-25 mg Tab Take 1 tablet by mouth once daily.    sildenafiL (VIAGRA) 50 MG tablet TAKE ONE TABLET BY MOUTH DAILY AS NEEDED FOR ERECTILE DYSFUNCTION     No current facility-administered medications on file prior to visit.       Objective:   Objective:  Wt Readings from Last 3 Encounters:   01/24/23 104.6 kg (230 lb 9.6 oz)   01/24/23 104.6 kg (230 lb 9.6 oz)   05/31/22 111.2 kg (245 lb 2.4 oz)     Temp Readings from Last 3 Encounters:   01/24/23 98 °F (36.7 °C) (Tympanic)   05/31/22 97.8 °F (36.6 °C) (Temporal)   01/26/21 98.1 °F (36.7 °C) (Temporal)     BP Readings from Last 3 Encounters:   01/24/23 132/78   01/24/23 132/78   05/31/22 135/79     Pulse Readings from Last 3 Encounters:   01/24/23 103   01/24/23 103   05/31/22 100       Physical Exam  Vitals reviewed.   Constitutional:       Appearance: He is well-developed.   HENT:      Head: Normocephalic and atraumatic.   Eyes:      General: No scleral icterus.     Conjunctiva/sclera: Conjunctivae normal.   Cardiovascular:      Rate and Rhythm: Normal rate and regular rhythm.      Pulses: Intact distal pulses.      Heart sounds: Normal heart sounds. No murmur heard.  Pulmonary:      Effort: No respiratory distress.      Breath sounds: No wheezing or rales.   Chest:      Chest wall: No tenderness.   Abdominal:      General: Bowel sounds are normal. There is no distension.      Palpations: Abdomen is soft.      Tenderness: There is no guarding.   Musculoskeletal:         General: Normal range of motion.      Cervical back: Normal range of motion and neck supple.   Skin:     General: Skin is warm.   Neurological:      Mental Status: He is alert and  oriented to person, place, and time.       Lab Results   Component Value Date    CHOL 146 05/31/2022    CHOL 208 (H) 01/26/2021    CHOL 151 08/16/2019     Lab Results   Component Value Date    HDL 37 (L) 05/31/2022    HDL 45 01/26/2021    HDL 40 08/16/2019     Lab Results   Component Value Date    LDLCALC 89.0 05/31/2022    LDLCALC 127.8 01/26/2021    LDLCALC 89.2 08/16/2019     Lab Results   Component Value Date    TRIG 100 05/31/2022    TRIG 176 (H) 01/26/2021    TRIG 109 08/16/2019     Lab Results   Component Value Date    CHOLHDL 25.3 05/31/2022    CHOLHDL 21.6 01/26/2021    CHOLHDL 26.5 08/16/2019       Chemistry        Component Value Date/Time     05/31/2022 0821    K 4.4 05/31/2022 0821     05/31/2022 0821    CO2 25 05/31/2022 0821    BUN 18 05/31/2022 0821    CREATININE 1.3 05/31/2022 0821     (H) 05/31/2022 0821        Component Value Date/Time    CALCIUM 9.5 05/31/2022 0821    ALKPHOS 124 05/31/2022 0821    AST 14 05/31/2022 0821    ALT 21 05/31/2022 0821    BILITOT 0.3 05/31/2022 0821    ESTGFRAFRICA >60.0 05/31/2022 0821    EGFRNONAA 56.5 (A) 05/31/2022 0821          Lab Results   Component Value Date    TSH 2.690 01/26/2021     No results found for: INR, PROTIME  Lab Results   Component Value Date    WBC 6.62 05/31/2022    HGB 12.3 (L) 05/31/2022    HCT 40.9 05/31/2022    MCV 92 05/31/2022     05/31/2022     BNP  @LABRCNTIP(BNP,BNPTRIAGEBLO)@  CrCl cannot be calculated (Patient's most recent lab result is older than the maximum 7 days allowed.).     Imaging:  ======  No results found for this or any previous visit.    No results found for this or any previous visit.    No results found for this or any previous visit.    No results found for this or any previous visit.    No results found for this or any previous visit.    No valid procedures specified.    Diagnostic Results:  ECG: Reviewed  Nsr, first degree av block   Unchanged since 2019     The 10-year ASCVD risk score  (Palak NELSON, et al., 2019) is: 18.7%    Values used to calculate the score:      Age: 67 years      Sex: Male      Is Non- : Yes      Diabetic: No      Tobacco smoker: No      Systolic Blood Pressure: 132 mmHg      Is BP treated: Yes      HDL Cholesterol: 37 mg/dL      Total Cholesterol: 146 mg/dL    Assessment and Plan:   Hypertension, unspecified type    Pre-operative clearance  -     Ambulatory referral/consult to Cardiology    Abnormal EKG  -     Ambulatory referral/consult to Cardiology    Hyperlipidemia, unspecified hyperlipidemia type    Erectile dysfunction, unspecified erectile dysfunction type    Prediabetes      Reviewed all tests and above medical conditions with patient in detail and formulated treatment plan.  Risk factor modification discussed.   Cardiac low salt diet discussed.  Maintaining healthy weight and weight loss goals were discussed in clinic.  Good functional status, METS>4   No active cardiac issues, no chest pain   Normal cardiac exam   No dynamic changes on ekg   Ok and stable to proceed with non cardiac intermediate risk surgery   Ekg unchanged from 2019, no dynamic changes , normal variant   Discuss low carb diet   Follow with pcp     Follow up in 6 months   Dr Valdez [facility] , fax [phone]

## 2023-01-24 NOTE — PROGRESS NOTES
Demario SANTANA Live  01/24/2023  [id number]    Sheeba Gonsales MD  Patient Care Team:  Sheeba Gonsales MD as PCP - General (Family Medicine)  Nishant Massey LPN as Care Coordinator (Internal Medicine)  Hilario Power MD as Consulting Physician (Oncology)          Visit Type:a scheduled routine follow-up visit    Chief Complaint:  Chief Complaint   Patient presents with    Pre-op Exam       History of Present Illness:    67 year old male presents today for Pre-Op Clearance.  He will undergo a Retropubic Prostatectomy on Feb 14th with Dr. Valdez  Patient has not had previous surgeries. Denies family history of complications with anesthesia  Patient takes daily Aspirin. Patient will stop taking the daily ASA by Feb 8th  Denies CP and SOB       History:  Past Medical History:   Diagnosis Date    HLD (hyperlipidemia) 2/15/2019    Hypertension     Neuroendocrine carcinoma of pancreas 4/21/2021     Past Surgical History:   Procedure Laterality Date    LIPOMA RESECTION       Family History   Problem Relation Age of Onset    Stroke Mother     Heart disease Father      Social History     Socioeconomic History    Marital status:     Number of children: 3   Occupational History    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   Tobacco Use    Smoking status: Never    Smokeless tobacco: Never   Substance and Sexual Activity    Alcohol use: Yes     Comment: occasionally/very rare     Drug use: No    Sexual activity: Yes     Partners: Female     Patient Active Problem List   Diagnosis    Hypertension    HLD (hyperlipidemia)    Erectile dysfunction    Gout    Neuroendocrine carcinoma of pancreas     Review of patient's allergies indicates:  No Known Allergies    The following were reviewed at this visit: active problem list, medication list, allergies, family history, social history, and health maintenance.    Medications:  Current Outpatient Medications on File Prior to Visit   Medication Sig Dispense Refill    allopurinoL  (ZYLOPRIM) 100 MG tablet Take 1 tablet (100 mg total) by mouth once daily. TAKE DAILY TO PREVENT GOUT 90 tablet 3    amLODIPine (NORVASC) 10 MG tablet Take 1 tablet (10 mg total) by mouth once daily. 90 tablet 3    aspirin (ECOTRIN) 81 MG EC tablet Take 81 mg by mouth once daily.      atorvastatin (LIPITOR) 10 MG tablet Take 1 tablet (10 mg total) by mouth once daily. 90 tablet 3    colchicine (COLCRYS) 0.6 mg tablet TAKE 2 TABS BY MOUTH AT FIRST SIGN OF FLARE AND THEN 1 TAB 1 HOUR LATER THEN 1 TAB A DAY UNTIL RESOLVED 30 tablet 2    lisinopriL-hydrochlorothiazide (PRINZIDE,ZESTORETIC) 20-25 mg Tab Take 1 tablet by mouth once daily. 90 tablet 3    sildenafiL (VIAGRA) 50 MG tablet TAKE ONE TABLET BY MOUTH DAILY AS NEEDED FOR ERECTILE DYSFUNCTION 30 tablet 0     No current facility-administered medications on file prior to visit.       Medications have been reviewed and reconciled with patient at this visit.  Barriers to medications reviewed with patient.    Adverse reactions to current medications reviewed with patient..    Over the counter medications reviewed and reconciled with patient.    Exam:  Wt Readings from Last 3 Encounters:   01/24/23 104.6 kg (230 lb 9.6 oz)   05/31/22 111.2 kg (245 lb 2.4 oz)   01/26/21 102.6 kg (226 lb 3.1 oz)     Temp Readings from Last 3 Encounters:   01/24/23 98 °F (36.7 °C) (Tympanic)   05/31/22 97.8 °F (36.6 °C) (Temporal)   01/26/21 98.1 °F (36.7 °C) (Temporal)     BP Readings from Last 3 Encounters:   01/24/23 132/78   05/31/22 135/79   01/26/21 130/85     Pulse Readings from Last 3 Encounters:   01/24/23 103   05/31/22 100   01/26/21 102     Body mass index is 30.42 kg/m².      Review of Systems   Constitutional:  Negative for malaise/fatigue.   Respiratory:  Negative for cough, shortness of breath and wheezing.    Cardiovascular:  Negative for chest pain and palpitations.   Neurological:  Negative for headaches.   Physical Exam  Vitals and nursing note reviewed.    Constitutional:       General: He is not in acute distress.     Appearance: He is well-developed. He is not diaphoretic.   HENT:      Head: Normocephalic and atraumatic.      Right Ear: External ear normal.      Left Ear: External ear normal.      Mouth/Throat:      Mouth: Mucous membranes are moist.   Eyes:      General:         Right eye: No discharge.         Left eye: No discharge.      Conjunctiva/sclera: Conjunctivae normal.      Pupils: Pupils are equal, round, and reactive to light.   Cardiovascular:      Rate and Rhythm: Regular rhythm. Tachycardia present.      Heart sounds: Normal heart sounds. No murmur heard.  Pulmonary:      Effort: Pulmonary effort is normal. No respiratory distress.      Breath sounds: Normal breath sounds. No wheezing.   Abdominal:      General: Bowel sounds are normal.   Neurological:      Mental Status: He is alert and oriented to person, place, and time.   Psychiatric:         Behavior: Behavior normal.         Thought Content: Thought content normal.         Judgment: Judgment normal.       Laboratory Reviewed ({Yes)  CMP  Sodium   Date Value Ref Range Status   01/24/2023 139 136 - 145 mmol/L Final     Potassium   Date Value Ref Range Status   01/24/2023 4.4 3.5 - 5.1 mmol/L Final     Chloride   Date Value Ref Range Status   01/24/2023 106 95 - 110 mmol/L Final     CO2   Date Value Ref Range Status   01/24/2023 24 23 - 29 mmol/L Final     Glucose   Date Value Ref Range Status   01/24/2023 175 (H) 70 - 110 mg/dL Final     BUN   Date Value Ref Range Status   01/24/2023 25 (H) 8 - 23 mg/dL Final     Creatinine   Date Value Ref Range Status   01/24/2023 1.3 0.5 - 1.4 mg/dL Final     Calcium   Date Value Ref Range Status   01/24/2023 9.3 8.7 - 10.5 mg/dL Final     Total Protein   Date Value Ref Range Status   01/24/2023 8.1 6.0 - 8.4 g/dL Final     Albumin   Date Value Ref Range Status   01/24/2023 3.7 3.5 - 5.2 g/dL Final   02/15/2019 4.1 3.6 - 5.1 g/dL Final     Comment:      **Data from J Clin Invest 1974:53:819-828 and J Clin Endocrinol   Metab 1973 36:3126-8482. Men with clinically significant   hypogonadal symptoms and testosterone values repeatedly in the   range of the 200-300 ng/dL or less, may benefit from testosterone  treatment after adequate risk and benefits counseling.  For additional information, please refer to   http://education.[employer]/faq/POC337 (This link is   being provided for informational/ educational purposes only.)  This test was developed and its analytical performance   characteristics have been determined by [employer]  [facility]. It has not been cleared or approved by the [place]  Food and Drug Administration. This assay has been validated   pursuant to the CLIA regulations and is used for clinical   purposes.  @ Test Performed By:  [facility]  Presley Boss M.D., Ph.D.,   [address] [place] [id number]  [place]  [id number]       Total Bilirubin   Date Value Ref Range Status   01/24/2023 0.3 0.1 - 1.0 mg/dL Final     Comment:     For infants and newborns, interpretation of results should be based  on gestational age, weight and in agreement with clinical  observations.    Premature Infant recommended reference ranges:  Up to 24 hours.............<8.0 mg/dL  Up to 48 hours............<12.0 mg/dL  3-5 days..................<15.0 mg/dL  6-29 days.................<15.0 mg/dL       Alkaline Phosphatase   Date Value Ref Range Status   01/24/2023 110 55 - 135 U/L Final     AST   Date Value Ref Range Status   01/24/2023 19 10 - 40 U/L Final     ALT   Date Value Ref Range Status   01/24/2023 34 10 - 44 U/L Final     Anion Gap   Date Value Ref Range Status   01/24/2023 9 8 - 16 mmol/L Final     eGFR   Date Value Ref Range Status   01/24/2023 59.8 (A) >60 mL/min/1.73 m^2 Final      Lab Results   Component Value Date    WBC 7.66 01/24/2023    HGB 11.0 (L) 01/24/2023    HCT 37.1 (L)  01/24/2023    MCV 88 01/24/2023     01/24/2023        Lab Results   Component Value Date    HGBA1C 6.8 (H) 01/30/2023        Demario was seen today for pre-op exam.    Diagnoses and all orders for this visit:    Pre-operative clearance  -     CBC Auto Differential; Future  -     COMPREHENSIVE METABOLIC PANEL; Future  -     IN OFFICE EKG 12-LEAD (to Muse)  -     X-Ray Chest PA And Lateral; Future  -     Ambulatory referral/consult to Cardiology; Future    Pre-operative laboratory examination  -     CBC Auto Differential; Future  -     COMPREHENSIVE METABOLIC PANEL; Future  -     IN OFFICE EKG 12-LEAD (to Muse)  -     X-Ray Chest PA And Lateral; Future    Hypertension, unspecified type  At visit, Blood pressure is at goal. Continue current medications      Obesity (BMI 30-39.9)  Encouraged healthy diet and exercise as tolerated to help bring BMI into normal range.      Malignant carcinoid tumors of other sites    Abnormal EKG  -     Ambulatory referral/consult to Cardiology; Future    Neuroendocrine carcinoma of pancreas    Patient is being followed by Dr. Power for Oncology   He has a follow up appointment on next month before his upcoming surgery     In office EKG Showed Sinus Tach with first degree AV block  EKG in 2019 showed NSR with left axis deviation  Patient received cardiac clearance    I have reviewed all labs and imaging and the patient is cleared to proceed with scheduled surgery          TIFFANIE Arizmendi    Care Plan/Goals: Reviewed    Goals    None         Follow up: No follow-ups on file.    After visit summary was printed and given to patient upon discharge today.  Patient goals and care plan are included in After Visit Summary.

## 2023-01-25 DIAGNOSIS — R73.09 ELEVATED RANDOM BLOOD GLUCOSE LEVEL: Primary | ICD-10-CM

## 2023-01-30 ENCOUNTER — LAB VISIT (OUTPATIENT)
Dept: LAB | Facility: HOSPITAL | Age: 68
End: 2023-01-30
Payer: MEDICARE

## 2023-01-30 DIAGNOSIS — R73.09 ELEVATED RANDOM BLOOD GLUCOSE LEVEL: ICD-10-CM

## 2023-01-30 LAB
ESTIMATED AVG GLUCOSE: 148 MG/DL (ref 68–131)
HBA1C MFR BLD: 6.8 % (ref 4–5.6)

## 2023-01-30 PROCEDURE — 83036 HEMOGLOBIN GLYCOSYLATED A1C: CPT

## 2023-01-30 PROCEDURE — 36415 COLL VENOUS BLD VENIPUNCTURE: CPT

## 2023-01-31 ENCOUNTER — TELEPHONE (OUTPATIENT)
Dept: INTERNAL MEDICINE | Facility: CLINIC | Age: 68
End: 2023-01-31
Payer: MEDICARE

## 2023-02-01 DIAGNOSIS — E11.9 TYPE 2 DIABETES MELLITUS WITHOUT COMPLICATION: ICD-10-CM

## 2023-02-22 ENCOUNTER — PATIENT OUTREACH (OUTPATIENT)
Dept: ADMINISTRATIVE | Facility: HOSPITAL | Age: 68
End: 2023-02-22
Payer: MEDICARE

## 2023-02-22 ENCOUNTER — TELEPHONE (OUTPATIENT)
Dept: ADMINISTRATIVE | Facility: HOSPITAL | Age: 68
End: 2023-02-22
Payer: MEDICARE

## 2023-03-22 ENCOUNTER — OFFICE VISIT (OUTPATIENT)
Dept: INTERNAL MEDICINE | Facility: CLINIC | Age: 68
End: 2023-03-22
Payer: MEDICARE

## 2023-03-22 VITALS
SYSTOLIC BLOOD PRESSURE: 120 MMHG | OXYGEN SATURATION: 96 % | DIASTOLIC BLOOD PRESSURE: 66 MMHG | HEART RATE: 104 BPM | TEMPERATURE: 99 F | HEIGHT: 73 IN | BODY MASS INDEX: 29.69 KG/M2 | WEIGHT: 224 LBS

## 2023-03-22 DIAGNOSIS — E11.9 ENCOUNTER FOR DIABETIC FOOT EXAM: ICD-10-CM

## 2023-03-22 DIAGNOSIS — E11.65 TYPE 2 DIABETES MELLITUS WITH HYPERGLYCEMIA, WITHOUT LONG-TERM CURRENT USE OF INSULIN: Primary | ICD-10-CM

## 2023-03-22 DIAGNOSIS — I10 HYPERTENSION, UNSPECIFIED TYPE: ICD-10-CM

## 2023-03-22 DIAGNOSIS — E78.5 HYPERLIPIDEMIA, UNSPECIFIED HYPERLIPIDEMIA TYPE: ICD-10-CM

## 2023-03-22 DIAGNOSIS — E66.9 OBESITY (BMI 30-39.9): ICD-10-CM

## 2023-03-22 DIAGNOSIS — Z90.79 H/O PROSTATECTOMY: ICD-10-CM

## 2023-03-22 DIAGNOSIS — D50.9 IRON DEFICIENCY ANEMIA, UNSPECIFIED IRON DEFICIENCY ANEMIA TYPE: ICD-10-CM

## 2023-03-22 DIAGNOSIS — C7A.098 MALIGNANT CARCINOID TUMORS OF OTHER SITES: ICD-10-CM

## 2023-03-22 PROCEDURE — 1160F RVW MEDS BY RX/DR IN RCRD: CPT | Mod: CPTII,S$GLB,,

## 2023-03-22 PROCEDURE — 99214 PR OFFICE/OUTPT VISIT, EST, LEVL IV, 30-39 MIN: ICD-10-PCS | Mod: S$GLB,,,

## 2023-03-22 PROCEDURE — 4010F PR ACE/ARB THEARPY RXD/TAKEN: ICD-10-PCS | Mod: CPTII,S$GLB,,

## 2023-03-22 PROCEDURE — 3044F HG A1C LEVEL LT 7.0%: CPT | Mod: CPTII,S$GLB,,

## 2023-03-22 PROCEDURE — 1159F PR MEDICATION LIST DOCUMENTED IN MEDICAL RECORD: ICD-10-PCS | Mod: CPTII,S$GLB,,

## 2023-03-22 PROCEDURE — 99999 PR PBB SHADOW E&M-EST. PATIENT-LVL IV: CPT | Mod: PBBFAC,,,

## 2023-03-22 PROCEDURE — 3008F PR BODY MASS INDEX (BMI) DOCUMENTED: ICD-10-PCS | Mod: CPTII,S$GLB,,

## 2023-03-22 PROCEDURE — 1159F MED LIST DOCD IN RCRD: CPT | Mod: CPTII,S$GLB,,

## 2023-03-22 PROCEDURE — 4010F ACE/ARB THERAPY RXD/TAKEN: CPT | Mod: CPTII,S$GLB,,

## 2023-03-22 PROCEDURE — 3044F PR MOST RECENT HEMOGLOBIN A1C LEVEL <7.0%: ICD-10-PCS | Mod: CPTII,S$GLB,,

## 2023-03-22 PROCEDURE — 99214 OFFICE O/P EST MOD 30 MIN: CPT | Mod: S$GLB,,,

## 2023-03-22 PROCEDURE — 1126F AMNT PAIN NOTED NONE PRSNT: CPT | Mod: CPTII,S$GLB,,

## 2023-03-22 PROCEDURE — 3288F FALL RISK ASSESSMENT DOCD: CPT | Mod: CPTII,S$GLB,,

## 2023-03-22 PROCEDURE — 3288F PR FALLS RISK ASSESSMENT DOCUMENTED: ICD-10-PCS | Mod: CPTII,S$GLB,,

## 2023-03-22 PROCEDURE — 99999 PR PBB SHADOW E&M-EST. PATIENT-LVL IV: ICD-10-PCS | Mod: PBBFAC,,,

## 2023-03-22 PROCEDURE — 3008F BODY MASS INDEX DOCD: CPT | Mod: CPTII,S$GLB,,

## 2023-03-22 PROCEDURE — 1126F PR PAIN SEVERITY QUANTIFIED, NO PAIN PRESENT: ICD-10-PCS | Mod: CPTII,S$GLB,,

## 2023-03-22 PROCEDURE — 1101F PT FALLS ASSESS-DOCD LE1/YR: CPT | Mod: CPTII,S$GLB,,

## 2023-03-22 PROCEDURE — 3078F PR MOST RECENT DIASTOLIC BLOOD PRESSURE < 80 MM HG: ICD-10-PCS | Mod: CPTII,S$GLB,,

## 2023-03-22 PROCEDURE — 1160F PR REVIEW ALL MEDS BY PRESCRIBER/CLIN PHARMACIST DOCUMENTED: ICD-10-PCS | Mod: CPTII,S$GLB,,

## 2023-03-22 PROCEDURE — 3074F SYST BP LT 130 MM HG: CPT | Mod: CPTII,S$GLB,,

## 2023-03-22 PROCEDURE — 3078F DIAST BP <80 MM HG: CPT | Mod: CPTII,S$GLB,,

## 2023-03-22 PROCEDURE — 3074F PR MOST RECENT SYSTOLIC BLOOD PRESSURE < 130 MM HG: ICD-10-PCS | Mod: CPTII,S$GLB,,

## 2023-03-22 PROCEDURE — 1101F PR PT FALLS ASSESS DOC 0-1 FALLS W/OUT INJ PAST YR: ICD-10-PCS | Mod: CPTII,S$GLB,,

## 2023-03-22 RX ORDER — PROMETHAZINE HYDROCHLORIDE 25 MG/1
25 TABLET ORAL EVERY 6 HOURS PRN
COMMUNITY
Start: 2023-01-01 | End: 2023-06-30

## 2023-03-22 RX ORDER — TRAMADOL HYDROCHLORIDE 50 MG/1
TABLET ORAL
COMMUNITY
Start: 2023-02-14 | End: 2023-06-30

## 2023-03-22 RX ORDER — CIPROFLOXACIN 500 MG/1
500 TABLET ORAL 2 TIMES DAILY
COMMUNITY
Start: 2023-01-02 | End: 2023-03-22

## 2023-03-22 RX ORDER — LEVOFLOXACIN 500 MG/1
TABLET, FILM COATED ORAL
COMMUNITY
Start: 2023-02-14 | End: 2023-03-22

## 2023-03-22 NOTE — PROGRESS NOTES
Demario SANTANA Live  03/22/2023  [id number]    Sheeba Gonsales MD  Patient Care Team:  Sheeba Gonsales MD as PCP - General (Family Medicine)  Nishant Massey LPN as Care Coordinator (Internal Medicine)  Hilario Power MD as Consulting Physician (Oncology)          Visit Type:a scheduled routine follow-up visit    Chief Complaint:  Chief Complaint   Patient presents with    Annual Exam        History of Present Illness:    Malignant carcinoid tumor  Oncologist Dr. Power     Feb underwent a prostatectomy with Dr. Valdez   Denies any complications with surgery   He has a f/u appt on next with Dr. 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    Medications:  Current Outpatient Medications on File Prior to Visit   Medication Sig Dispense Refill    allopurinoL (ZYLOPRIM) 100 MG tablet Take 1  tablet (100 mg total) by mouth once daily. TAKE DAILY TO PREVENT GOUT 90 tablet 3    amLODIPine (NORVASC) 10 MG tablet Take 1 tablet (10 mg total) by mouth once daily. 90 tablet 3    aspirin (ECOTRIN) 81 MG EC tablet Take 81 mg by mouth once daily.      atorvastatin (LIPITOR) 10 MG tablet Take 1 tablet (10 mg total) by mouth once daily. 90 tablet 3    colchicine (COLCRYS) 0.6 mg tablet TAKE 2 TABS BY MOUTH AT FIRST SIGN OF FLARE AND THEN 1 TAB 1 HOUR LATER THEN 1 TAB A DAY UNTIL RESOLVED 30 tablet 2    lisinopriL-hydrochlorothiazide (PRINZIDE,ZESTORETIC) 20-25 mg Tab Take 1 tablet by mouth once daily. 90 tablet 3    sildenafiL (VIAGRA) 50 MG tablet TAKE ONE TABLET BY MOUTH DAILY AS NEEDED FOR ERECTILE DYSFUNCTION 30 tablet 0     No current facility-administered medications on file prior to visit.       Medications have been reviewed and reconciled with patient at this visit.  Barriers to medications reviewed with patient.    Adverse reactions to current medications reviewed with patient..    Over the counter medications reviewed and reconciled with patient.    Exam:  Wt Readings from Last 3 Encounters:   01/24/23 104.6 kg (230 lb 9.6 oz)   01/24/23 104.6 kg (230 lb 9.6 oz)   05/31/22 111.2 kg (245 lb 2.4 oz)     Temp Readings from Last 3 Encounters:   01/24/23 98 °F (36.7 °C) (Tympanic)   05/31/22 97.8 °F (36.6 °C) (Temporal)   01/26/21 98.1 °F (36.7 °C) (Temporal)     BP Readings from Last 3 Encounters:   01/24/23 132/78   01/24/23 132/78   05/31/22 135/79     Pulse Readings from Last 3 Encounters:   01/24/23 103   01/24/23 103   05/31/22 100     There is no height or weight on file to calculate BMI.      Review of Systems   Eyes:  Negative for blurred vision and double vision.   Respiratory:  Negative for shortness of breath.    Cardiovascular:  Negative for chest pain and palpitations.   Neurological:  Negative for dizziness and headaches.   Physical Exam  Vitals and nursing note reviewed.   Constitutional:        General: He is not in acute distress.     Appearance: He is well-developed. He is not diaphoretic.   HENT:      Head: Normocephalic and atraumatic.      Right Ear: External ear normal.      Left Ear: External ear normal.   Eyes:      General:         Right eye: No discharge.         Left eye: No discharge.      Conjunctiva/sclera: Conjunctivae normal.      Pupils: Pupils are equal, round, and reactive to light.   Cardiovascular:      Rate and Rhythm: Normal rate and regular rhythm.      Pulses:           Dorsalis pedis pulses are 2+ on the right side and 2+ on the left side.        Posterior tibial pulses are 2+ on the right side and 2+ on the left side.      Heart sounds: Normal heart sounds. No murmur heard.  Pulmonary:      Effort: Pulmonary effort is normal. No respiratory distress.      Breath sounds: Normal breath sounds. No wheezing.   Abdominal:      General: Bowel sounds are normal.   Feet:      Right foot:      Protective Sensation: 10 sites tested.  10 sites sensed.      Skin integrity: Callus and dry skin present.      Left foot:      Protective Sensation: 10 sites tested.  10 sites sensed.      Skin integrity: Callus and dry skin present.   Neurological:      Mental Status: He is alert and oriented to person, place, and time.   Psychiatric:         Behavior: Behavior normal.         Thought Content: Thought content normal.         Judgment: Judgment normal.       Laboratory Reviewed ({Yes)  Lab Results   Component Value Date    WBC 7.66 01/24/2023    HGB 11.0 (L) 01/24/2023    HCT 37.1 (L) 01/24/2023     01/24/2023    CHOL 146 05/31/2022    TRIG 100 05/31/2022    HDL 37 (L) 05/31/2022    ALT 34 01/24/2023    AST 19 01/24/2023     01/24/2023    K 4.4 01/24/2023     01/24/2023    CREATININE 1.3 01/24/2023    BUN 25 (H) 01/24/2023    CO2 24 01/24/2023    TSH 2.690 01/26/2021    PSA 16.4 (H) 05/31/2022    HGBA1C 6.8 (H) 01/30/2023         Demario was seen today for annual  exam.    Diagnoses and all orders for this visit:    Type 2 diabetes mellitus with hyperglycemia, without long-term current use of insulin  -     HEMOGLOBIN A1C; Future  -     Microalbumin/Creatinine Ratio, Urine; Future  -     Ambulatory referral/consult to Optometry; Future    Hypertension, unspecified type  At visit, Blood pressure is at goal. Continue current medications      Obesity (BMI 30-39.9)  -     HEMOGLOBIN A1C; Future    Malignant carcinoid tumors of other sites    Iron deficiency anemia, unspecified iron deficiency anemia type    Hyperlipidemia, unspecified hyperlipidemia type  -     Lipid Panel; Future    Encounter for diabetic foot exam    H/O prostatectomy  Has a follow up appt with Dr. Valdez on next week       Hem/oncology  Sees Dr. Power   Appt on next month      Labs in June  Schedule eye exam  Appt with PCP 6 months after lab results       Care Plan/Goals: Reviewed    Goals    None         Follow up: No follow-ups on file.    After visit summary was printed and given to patient upon discharge today.  Patient goals and care plan are included in After Visit Summary.

## 2023-03-24 ENCOUNTER — TELEPHONE (OUTPATIENT)
Dept: ADMINISTRATIVE | Facility: HOSPITAL | Age: 68
End: 2023-03-24
Payer: MEDICARE

## 2023-03-31 ENCOUNTER — PATIENT OUTREACH (OUTPATIENT)
Dept: ADMINISTRATIVE | Facility: HOSPITAL | Age: 68
End: 2023-03-31
Payer: MEDICARE

## 2023-03-31 NOTE — PROGRESS NOTES
Demario SANTANA [place]  03/31/2023  [id number]    Sheeba Gonsales MD  Patient Care Team:  Sheeba Gonsales MD as PCP - General (Family Medicine)  Nishant Massey LPN as Care Coordinator (Internal Medicine)  Hilario Power MD as Consulting Physician (Oncology)          Visit Type:a scheduled visit following a recent hospitalization    Chief Complaint:  Chief Complaint   Patient presents with    Hospital Follow Up        History of Present Illness:    Went to ER for SOB/CP  Admitted into [facility]  ER for pulmonary embolus and bilateral DVTs   D-Dimer 9.98   He was discharged home with Eliquis     He had a UTI  D/C home with Cefdinir and Methenamine  Completed the cefdinir   Has an appt with Dr. Valdez with Urology on tomorrow    Following Jannet with hem/oncology   SOB and CP has improved  Denies s/s of bleeding     Transitional Care Note    Family and/or Caretaker present at visit?  No.  Diagnostic tests reviewed/disposition: I have reviewed all completed as well as pending diagnostic tests at the time of discharge.  Disease/illness education:   Home health/community services discussion/referrals: Patient does not have home health established from hospital visit.  They do not need home health.  If needed, we will set up home health for the patient.   Establishment or re-establishment of referral orders for community resources: No other necessary community resources.   Discussion with other health care providers: No discussion with other health care providers necessary.          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    Medications:  Current Outpatient Medications on File Prior to Visit   Medication Sig Dispense Refill    allopurinoL (ZYLOPRIM) 100 MG tablet Take 1 tablet (100 mg total) by mouth once daily. TAKE DAILY TO PREVENT GOUT 90 tablet 3    amLODIPine (NORVASC) 10 MG tablet Take 1 tablet (10 mg total) by mouth once daily. 90 tablet 3    aspirin (ECOTRIN) 81 MG EC tablet Take 81 mg by mouth once daily.      atorvastatin (LIPITOR) 10 MG tablet Take 1 tablet (10 mg total) by mouth once daily. 90 tablet 3    colchicine (COLCRYS) 0.6 mg tablet TAKE 2 TABS BY MOUTH AT FIRST SIGN OF FLARE AND THEN 1 TAB 1 HOUR LATER THEN 1 TAB A DAY UNTIL RESOLVED 30 tablet 2    lisinopriL-hydrochlorothiazide (PRINZIDE,ZESTORETIC) 20-25 mg Tab Take 1 tablet by mouth once daily. 90 tablet 3    promethazine (PHENERGAN) 25 MG tablet Take 25 mg by mouth every 6 (six) hours as needed.      sildenafiL (VIAGRA) 50 MG tablet TAKE ONE TABLET BY MOUTH DAILY AS NEEDED FOR ERECTILE DYSFUNCTION 30 tablet 0    traMADoL (ULTRAM) 50 mg tablet        No current facility-administered medications on file prior to visit.       Medications have been reviewed and reconciled with patient at this visit.  Barriers to medications reviewed with patient.    Adverse reactions to current medications reviewed with patient..    Over the counter medications  reviewed and reconciled with patient.    Exam:  Wt Readings from Last 3 Encounters:   03/22/23 101.6 kg (223 lb 15.8 oz)   01/24/23 104.6 kg (230 lb 9.6 oz)   01/24/23 104.6 kg (230 lb 9.6 oz)     Temp Readings from Last 3 Encounters:   03/22/23 98.8 °F (37.1 °C) (Tympanic)   01/24/23 98 °F (36.7 °C) (Tympanic)   05/31/22 97.8 °F (36.6 °C) (Temporal)     BP Readings from Last 3 Encounters:   03/22/23 120/66   01/24/23 132/78   01/24/23 132/78     Pulse Readings from Last 3 Encounters:   03/22/23 104   01/24/23 103   01/24/23 103     There is no height or weight on file to calculate BMI.      Review of Systems   Respiratory:  Positive for shortness of breath. Negative for cough and wheezing.    Cardiovascular:  Negative for chest pain and palpitations.   Physical Exam  Vitals and nursing note reviewed.   Constitutional:       General: He is not in acute distress.     Appearance: He is well-developed. He is not diaphoretic.   HENT:      Head: Normocephalic and atraumatic.      Right Ear: External ear normal.      Left Ear: External ear normal.   Eyes:      General:         Right eye: No discharge.         Left eye: No discharge.      Conjunctiva/sclera: Conjunctivae normal.      Pupils: Pupils are equal, round, and reactive to light.   Cardiovascular:      Rate and Rhythm: Normal rate and regular rhythm.      Heart sounds: Normal heart sounds. No murmur heard.  Pulmonary:      Effort: Pulmonary effort is normal. No respiratory distress.      Breath sounds: Normal breath sounds. No wheezing.   Neurological:      Mental Status: He is alert and oriented to person, place, and time.   Psychiatric:         Behavior: Behavior normal.         Thought Content: Thought content normal.         Judgment: Judgment normal.       Laboratory Reviewed ({Yes)  Lab Results   Component Value Date    WBC 7.66 01/24/2023    HGB 11.0 (L) 01/24/2023    HCT 37.1 (L) 01/24/2023     01/24/2023    CHOL 146 05/31/2022    TRIG 100  05/31/2022    HDL 37 (L) 05/31/2022    ALT 34 01/24/2023    AST 19 01/24/2023     01/24/2023    K 4.4 01/24/2023     01/24/2023    CREATININE 1.3 01/24/2023    BUN 25 (H) 01/24/2023    CO2 24 01/24/2023    TSH 2.690 01/26/2021    PSA 16.4 (H) 05/31/2022    HGBA1C 6.8 (H) 01/30/2023       Demario was seen today for hospital follow up.    Diagnoses and all orders for this visit:    Hospital discharge follow-up    Acute deep vein thrombosis (DVT) of calf muscle vein of both lower extremities    Pulmonary embolism without acute cor pulmonale, unspecified chronicity, unspecified pulmonary embolism type    Malignant carcinoid tumors of other sites    Hypertension, unspecified type  At visit, Blood pressure is at goal. Continue current medications      Type 2 diabetes mellitus with hyperglycemia, without long-term current use of insulin  Lab Results   Component Value Date    HGBA1C 6.8 (H) 01/30/2023        Cystitis  -     Urinalysis, Reflex to Urine Culture; Future    Urology  Check Urine today  Has an appt with urology on maría    Hem/Oncology  Following externally.  Encouraged to make a hospital appt with oncologist   Cont taking Eliquis and avoid long car rides  Discussed when to seek emergent care      Appt with PCP in May for DM follow up  Labs prior to the appt    Care Plan/Goals: Reviewed    Goals    None         Follow up: No follow-ups on file.    After visit summary was printed and given to patient upon discharge today.  Patient goals and care plan are included in After Visit Summary.

## 2023-03-31 NOTE — PROGRESS NOTES
Called patient to confirm hospital follow up scheduled on 4/03/2023.   Patient is confirmed. Encouraged patient to bring all medications and BP cuff to follow up visit.

## 2023-04-03 ENCOUNTER — OFFICE VISIT (OUTPATIENT)
Dept: INTERNAL MEDICINE | Facility: CLINIC | Age: 68
End: 2023-04-03
Payer: MEDICARE

## 2023-04-03 VITALS
HEIGHT: 73 IN | DIASTOLIC BLOOD PRESSURE: 68 MMHG | OXYGEN SATURATION: 96 % | SYSTOLIC BLOOD PRESSURE: 124 MMHG | TEMPERATURE: 98 F | HEART RATE: 84 BPM | BODY MASS INDEX: 29.74 KG/M2 | WEIGHT: 224.44 LBS

## 2023-04-03 DIAGNOSIS — I10 HYPERTENSION, UNSPECIFIED TYPE: ICD-10-CM

## 2023-04-03 DIAGNOSIS — I82.463 ACUTE DEEP VEIN THROMBOSIS (DVT) OF CALF MUSCLE VEIN OF BOTH LOWER EXTREMITIES: ICD-10-CM

## 2023-04-03 DIAGNOSIS — E11.65 TYPE 2 DIABETES MELLITUS WITH HYPERGLYCEMIA, WITHOUT LONG-TERM CURRENT USE OF INSULIN: ICD-10-CM

## 2023-04-03 DIAGNOSIS — C7A.098 MALIGNANT CARCINOID TUMORS OF OTHER SITES: ICD-10-CM

## 2023-04-03 DIAGNOSIS — N30.90 CYSTITIS: ICD-10-CM

## 2023-04-03 DIAGNOSIS — Z09 HOSPITAL DISCHARGE FOLLOW-UP: Primary | ICD-10-CM

## 2023-04-03 DIAGNOSIS — I26.99 PULMONARY EMBOLISM WITHOUT ACUTE COR PULMONALE, UNSPECIFIED CHRONICITY, UNSPECIFIED PULMONARY EMBOLISM TYPE: ICD-10-CM

## 2023-04-03 LAB
BACTERIA #/AREA URNS HPF: ABNORMAL /HPF
BILIRUB UR QL STRIP: NEGATIVE
CLARITY UR: ABNORMAL
COLOR UR: YELLOW
GLUCOSE UR QL STRIP: NEGATIVE
HGB UR QL STRIP: ABNORMAL
KETONES UR QL STRIP: NEGATIVE
LEUKOCYTE ESTERASE UR QL STRIP: ABNORMAL
MICROSCOPIC COMMENT: ABNORMAL
NITRITE UR QL STRIP: NEGATIVE
PH UR STRIP: 6 [PH] (ref 5–8)
PROT UR QL STRIP: ABNORMAL
RBC #/AREA URNS HPF: >100 /HPF (ref 0–4)
SP GR UR STRIP: >=1.03 (ref 1–1.03)
SQUAMOUS #/AREA URNS HPF: ABNORMAL /HPF
URN SPEC COLLECT METH UR: ABNORMAL
UROBILINOGEN UR STRIP-ACNC: NEGATIVE EU/DL
WBC #/AREA URNS HPF: 30 /HPF (ref 0–5)

## 2023-04-03 PROCEDURE — 99214 PR OFFICE/OUTPT VISIT, EST, LEVL IV, 30-39 MIN: ICD-10-PCS | Mod: S$GLB,,,

## 2023-04-03 PROCEDURE — 1126F PR PAIN SEVERITY QUANTIFIED, NO PAIN PRESENT: ICD-10-PCS | Mod: CPTII,S$GLB,,

## 2023-04-03 PROCEDURE — 3044F PR MOST RECENT HEMOGLOBIN A1C LEVEL <7.0%: ICD-10-PCS | Mod: CPTII,S$GLB,,

## 2023-04-03 PROCEDURE — 1159F PR MEDICATION LIST DOCUMENTED IN MEDICAL RECORD: ICD-10-PCS | Mod: CPTII,S$GLB,,

## 2023-04-03 PROCEDURE — 3288F FALL RISK ASSESSMENT DOCD: CPT | Mod: CPTII,S$GLB,,

## 2023-04-03 PROCEDURE — 87086 URINE CULTURE/COLONY COUNT: CPT

## 2023-04-03 PROCEDURE — 1101F PT FALLS ASSESS-DOCD LE1/YR: CPT | Mod: CPTII,S$GLB,,

## 2023-04-03 PROCEDURE — 99999 PR PBB SHADOW E&M-EST. PATIENT-LVL IV: CPT | Mod: PBBFAC,,,

## 2023-04-03 PROCEDURE — 1160F PR REVIEW ALL MEDS BY PRESCRIBER/CLIN PHARMACIST DOCUMENTED: ICD-10-PCS | Mod: CPTII,S$GLB,,

## 2023-04-03 PROCEDURE — 99999 PR PBB SHADOW E&M-EST. PATIENT-LVL IV: ICD-10-PCS | Mod: PBBFAC,,,

## 2023-04-03 PROCEDURE — 4010F PR ACE/ARB THEARPY RXD/TAKEN: ICD-10-PCS | Mod: CPTII,S$GLB,,

## 2023-04-03 PROCEDURE — 1101F PR PT FALLS ASSESS DOC 0-1 FALLS W/OUT INJ PAST YR: ICD-10-PCS | Mod: CPTII,S$GLB,,

## 2023-04-03 PROCEDURE — 3078F PR MOST RECENT DIASTOLIC BLOOD PRESSURE < 80 MM HG: ICD-10-PCS | Mod: CPTII,S$GLB,,

## 2023-04-03 PROCEDURE — 1159F MED LIST DOCD IN RCRD: CPT | Mod: CPTII,S$GLB,,

## 2023-04-03 PROCEDURE — 1160F RVW MEDS BY RX/DR IN RCRD: CPT | Mod: CPTII,S$GLB,,

## 2023-04-03 PROCEDURE — 81000 URINALYSIS NONAUTO W/SCOPE: CPT

## 2023-04-03 PROCEDURE — 3074F SYST BP LT 130 MM HG: CPT | Mod: CPTII,S$GLB,,

## 2023-04-03 PROCEDURE — 4010F ACE/ARB THERAPY RXD/TAKEN: CPT | Mod: CPTII,S$GLB,,

## 2023-04-03 PROCEDURE — 99214 OFFICE O/P EST MOD 30 MIN: CPT | Mod: S$GLB,,,

## 2023-04-03 PROCEDURE — 3288F PR FALLS RISK ASSESSMENT DOCUMENTED: ICD-10-PCS | Mod: CPTII,S$GLB,,

## 2023-04-03 PROCEDURE — 3008F PR BODY MASS INDEX (BMI) DOCUMENTED: ICD-10-PCS | Mod: CPTII,S$GLB,,

## 2023-04-03 PROCEDURE — 3044F HG A1C LEVEL LT 7.0%: CPT | Mod: CPTII,S$GLB,,

## 2023-04-03 PROCEDURE — 3078F DIAST BP <80 MM HG: CPT | Mod: CPTII,S$GLB,,

## 2023-04-03 PROCEDURE — 1126F AMNT PAIN NOTED NONE PRSNT: CPT | Mod: CPTII,S$GLB,,

## 2023-04-03 PROCEDURE — 3074F PR MOST RECENT SYSTOLIC BLOOD PRESSURE < 130 MM HG: ICD-10-PCS | Mod: CPTII,S$GLB,,

## 2023-04-03 PROCEDURE — 3008F BODY MASS INDEX DOCD: CPT | Mod: CPTII,S$GLB,,

## 2023-04-03 RX ORDER — OMEPRAZOLE 20 MG/1
20 TABLET, ORALLY DISINTEGRATING, DELAYED RELEASE ORAL DAILY PRN
COMMUNITY
End: 2024-02-01

## 2023-04-03 RX ORDER — CEFDINIR 300 MG/1
300 CAPSULE ORAL 2 TIMES DAILY
COMMUNITY
Start: 2023-03-27 | End: 2023-06-30

## 2023-04-03 RX ORDER — APIXABAN 5 MG (74)
KIT ORAL
COMMUNITY
Start: 2023-03-27 | End: 2023-04-21

## 2023-04-03 RX ORDER — METHENAMINE HIPPURATE 1000 MG/1
1 TABLET ORAL 2 TIMES DAILY
COMMUNITY
Start: 2023-03-27 | End: 2023-06-30

## 2023-04-05 LAB — BACTERIA UR CULT: NO GROWTH

## 2023-04-17 ENCOUNTER — OFFICE VISIT (OUTPATIENT)
Dept: FAMILY MEDICINE | Facility: CLINIC | Age: 68
End: 2023-04-17
Payer: MEDICARE

## 2023-04-17 VITALS
HEIGHT: 73 IN | DIASTOLIC BLOOD PRESSURE: 80 MMHG | BODY MASS INDEX: 29.8 KG/M2 | RESPIRATION RATE: 18 BRPM | SYSTOLIC BLOOD PRESSURE: 142 MMHG | HEART RATE: 20 BPM | WEIGHT: 224.88 LBS | TEMPERATURE: 99 F

## 2023-04-17 DIAGNOSIS — I26.99 OTHER ACUTE PULMONARY EMBOLISM, UNSPECIFIED WHETHER ACUTE COR PULMONALE PRESENT: ICD-10-CM

## 2023-04-17 DIAGNOSIS — E11.9 TYPE 2 DIABETES MELLITUS WITHOUT COMPLICATION, WITHOUT LONG-TERM CURRENT USE OF INSULIN: ICD-10-CM

## 2023-04-17 DIAGNOSIS — I82.4Y3 ACUTE DEEP VEIN THROMBOSIS (DVT) OF PROXIMAL VEIN OF BOTH LOWER EXTREMITIES: ICD-10-CM

## 2023-04-17 DIAGNOSIS — I10 HYPERTENSION, UNSPECIFIED TYPE: ICD-10-CM

## 2023-04-17 DIAGNOSIS — C7A.098 MALIGNANT CARCINOID TUMORS OF OTHER SITES: ICD-10-CM

## 2023-04-17 DIAGNOSIS — E66.3 OVER WEIGHT: ICD-10-CM

## 2023-04-17 DIAGNOSIS — C61 PROSTATE CANCER: ICD-10-CM

## 2023-04-17 DIAGNOSIS — N18.31 CHRONIC KIDNEY DISEASE, STAGE 3A: ICD-10-CM

## 2023-04-17 DIAGNOSIS — M10.9 GOUT, UNSPECIFIED CAUSE, UNSPECIFIED CHRONICITY, UNSPECIFIED SITE: ICD-10-CM

## 2023-04-17 DIAGNOSIS — C25.9 MALIGNANT NEOPLASM OF PANCREAS, UNSPECIFIED LOCATION OF MALIGNANCY: ICD-10-CM

## 2023-04-17 DIAGNOSIS — Z00.00 ENCOUNTER FOR PREVENTIVE HEALTH EXAMINATION: Primary | ICD-10-CM

## 2023-04-17 DIAGNOSIS — E78.5 HYPERLIPIDEMIA, UNSPECIFIED HYPERLIPIDEMIA TYPE: ICD-10-CM

## 2023-04-17 PROBLEM — I82.403 ACUTE DEEP VEIN THROMBOSIS (DVT) OF BOTH LOWER EXTREMITIES: Status: ACTIVE | Noted: 2023-04-17

## 2023-04-17 PROBLEM — C7B.8 OTHER SECONDARY NEUROENDOCRINE TUMORS: Status: ACTIVE | Noted: 2023-04-17

## 2023-04-17 PROCEDURE — 3008F PR BODY MASS INDEX (BMI) DOCUMENTED: ICD-10-PCS | Mod: CPTII,S$GLB,, | Performed by: NURSE PRACTITIONER

## 2023-04-17 PROCEDURE — 3079F DIAST BP 80-89 MM HG: CPT | Mod: CPTII,S$GLB,, | Performed by: NURSE PRACTITIONER

## 2023-04-17 PROCEDURE — 1159F MED LIST DOCD IN RCRD: CPT | Mod: CPTII,S$GLB,, | Performed by: NURSE PRACTITIONER

## 2023-04-17 PROCEDURE — 3077F SYST BP >= 140 MM HG: CPT | Mod: CPTII,S$GLB,, | Performed by: NURSE PRACTITIONER

## 2023-04-17 PROCEDURE — 3077F PR MOST RECENT SYSTOLIC BLOOD PRESSURE >= 140 MM HG: ICD-10-PCS | Mod: CPTII,S$GLB,, | Performed by: NURSE PRACTITIONER

## 2023-04-17 PROCEDURE — 4010F PR ACE/ARB THEARPY RXD/TAKEN: ICD-10-PCS | Mod: CPTII,S$GLB,, | Performed by: NURSE PRACTITIONER

## 2023-04-17 PROCEDURE — 3044F HG A1C LEVEL LT 7.0%: CPT | Mod: CPTII,S$GLB,, | Performed by: NURSE PRACTITIONER

## 2023-04-17 PROCEDURE — 1126F PR PAIN SEVERITY QUANTIFIED, NO PAIN PRESENT: ICD-10-PCS | Mod: CPTII,S$GLB,, | Performed by: NURSE PRACTITIONER

## 2023-04-17 PROCEDURE — 1126F AMNT PAIN NOTED NONE PRSNT: CPT | Mod: CPTII,S$GLB,, | Performed by: NURSE PRACTITIONER

## 2023-04-17 PROCEDURE — 1160F RVW MEDS BY RX/DR IN RCRD: CPT | Mod: CPTII,S$GLB,, | Performed by: NURSE PRACTITIONER

## 2023-04-17 PROCEDURE — 1170F PR FUNCTIONAL STATUS ASSESSED: ICD-10-PCS | Mod: CPTII,S$GLB,, | Performed by: NURSE PRACTITIONER

## 2023-04-17 PROCEDURE — 1170F FXNL STATUS ASSESSED: CPT | Mod: CPTII,S$GLB,, | Performed by: NURSE PRACTITIONER

## 2023-04-17 PROCEDURE — 99999 PR PBB SHADOW E&M-EST. PATIENT-LVL V: CPT | Mod: PBBFAC,,, | Performed by: NURSE PRACTITIONER

## 2023-04-17 PROCEDURE — 1160F PR REVIEW ALL MEDS BY PRESCRIBER/CLIN PHARMACIST DOCUMENTED: ICD-10-PCS | Mod: CPTII,S$GLB,, | Performed by: NURSE PRACTITIONER

## 2023-04-17 PROCEDURE — 1101F PT FALLS ASSESS-DOCD LE1/YR: CPT | Mod: CPTII,S$GLB,, | Performed by: NURSE PRACTITIONER

## 2023-04-17 PROCEDURE — 1159F PR MEDICATION LIST DOCUMENTED IN MEDICAL RECORD: ICD-10-PCS | Mod: CPTII,S$GLB,, | Performed by: NURSE PRACTITIONER

## 2023-04-17 PROCEDURE — 3044F PR MOST RECENT HEMOGLOBIN A1C LEVEL <7.0%: ICD-10-PCS | Mod: CPTII,S$GLB,, | Performed by: NURSE PRACTITIONER

## 2023-04-17 PROCEDURE — 3008F BODY MASS INDEX DOCD: CPT | Mod: CPTII,S$GLB,, | Performed by: NURSE PRACTITIONER

## 2023-04-17 PROCEDURE — 3288F FALL RISK ASSESSMENT DOCD: CPT | Mod: CPTII,S$GLB,, | Performed by: NURSE PRACTITIONER

## 2023-04-17 PROCEDURE — G0439 PPPS, SUBSEQ VISIT: HCPCS | Mod: S$GLB,,, | Performed by: NURSE PRACTITIONER

## 2023-04-17 PROCEDURE — 3288F PR FALLS RISK ASSESSMENT DOCUMENTED: ICD-10-PCS | Mod: CPTII,S$GLB,, | Performed by: NURSE PRACTITIONER

## 2023-04-17 PROCEDURE — 4010F ACE/ARB THERAPY RXD/TAKEN: CPT | Mod: CPTII,S$GLB,, | Performed by: NURSE PRACTITIONER

## 2023-04-17 PROCEDURE — 3079F PR MOST RECENT DIASTOLIC BLOOD PRESSURE 80-89 MM HG: ICD-10-PCS | Mod: CPTII,S$GLB,, | Performed by: NURSE PRACTITIONER

## 2023-04-17 PROCEDURE — 99999 PR PBB SHADOW E&M-EST. PATIENT-LVL V: ICD-10-PCS | Mod: PBBFAC,,, | Performed by: NURSE PRACTITIONER

## 2023-04-17 PROCEDURE — G0439 PR MEDICARE ANNUAL WELLNESS SUBSEQUENT VISIT: ICD-10-PCS | Mod: S$GLB,,, | Performed by: NURSE PRACTITIONER

## 2023-04-17 PROCEDURE — 1101F PR PT FALLS ASSESS DOC 0-1 FALLS W/OUT INJ PAST YR: ICD-10-PCS | Mod: CPTII,S$GLB,, | Performed by: NURSE PRACTITIONER

## 2023-04-17 NOTE — PROGRESS NOTES
"  Demario Live presented for a  Medicare AWV and comprehensive Health Risk Assessment today. The following components were reviewed and updated:    Medical history  Family History  Social history  Allergies and Current Medications  Health Risk Assessment  Health Maintenance  Care Team         ** See Completed Assessments for Annual Wellness Visit within the encounter summary.**         The following assessments were completed:  Living Situation  CAGE  Depression Screening  Timed Get Up and Go  Whisper Test  Cognitive Function Screening  Nutrition Screening  ADL Screening  PAQ Screening        Vitals:    04/17/23 0907   BP: (!) 142/80   Pulse: (!) 20   Resp: 18   Temp: 98.5 °F (36.9 °C)   Weight: 102 kg (224 lb 13.9 oz)   Height: 6' 1" (1.854 m)     Body mass index is 29.67 kg/m².  Physical Exam  Vitals and nursing note reviewed.   Constitutional:       General: He is not in acute distress.     Appearance: He is well-developed.   HENT:      Head: Normocephalic and atraumatic.   Eyes:      Pupils: Pupils are equal, round, and reactive to light.   Neck:      Vascular: No carotid bruit.   Cardiovascular:      Rate and Rhythm: Normal rate and regular rhythm.      Pulses: Normal pulses.      Heart sounds: Normal heart sounds. No murmur heard.    No gallop.   Pulmonary:      Effort: Pulmonary effort is normal.      Breath sounds: Normal breath sounds.   Abdominal:      General: Bowel sounds are normal. There is no distension.      Palpations: Abdomen is soft.      Tenderness: There is no abdominal tenderness.   Musculoskeletal:         General: Normal range of motion.   Skin:     General: Skin is warm and dry.   Neurological:      Motor: No abnormal muscle tone.   Psychiatric:         Speech: Speech normal.         Behavior: Behavior normal.         Thought Content: Thought content normal.         Judgment: Judgment normal.     Current Outpatient Medications   Medication Instructions    allopurinoL (ZYLOPRIM) 100 mg, Oral, " Daily, TAKE DAILY TO PREVENT GOUT    amLODIPine (NORVASC) 10 mg, Oral, Daily    aspirin (ECOTRIN) 81 mg, Oral, Daily    atorvastatin (LIPITOR) 10 mg, Oral, Daily    cefdinir (OMNICEF) 300 mg, Oral, 2 times daily    colchicine (COLCRYS) 0.6 mg tablet TAKE 2 TABS BY MOUTH AT FIRST SIGN OF FLARE AND THEN 1 TAB 1 HOUR LATER THEN 1 TAB A DAY UNTIL RESOLVED    ELIQUIS DVT-PE TREAT 30D START 5 mg (74 tabs) DsPk TAKE AS DIRECTED ON PACKAGE    lisinopriL-hydrochlorothiazide (PRINZIDE,ZESTORETIC) 20-25 mg Tab 1 tablet, Oral, Daily    methenamine (HIPREX) 1 g, Oral, 2 times daily    omeprazole 20 mg, Oral    promethazine (PHENERGAN) 25 mg, Oral, Every 6 hours PRN    sildenafiL (VIAGRA) 50 MG tablet TAKE ONE TABLET BY MOUTH DAILY AS NEEDED FOR ERECTILE DYSFUNCTION    traMADoL (ULTRAM) 50 mg tablet No dose, route, or frequency recorded.           Diagnoses and health risks identified today and associated recommendations/orders:    1. Encounter for preventive health examination  Review for Opioid Screening: Patient does not have rx for Opioids.  Review for Substance Use Disorders: Patient does not use substance.    Pt do discuss colorguard with pcp    2. Acute deep vein thrombosis (DVT) of proximal vein of both lower extremities.  Pt went to [facility] ER on 4/3/2023  with SOB - dx with shellie dvt and PE  Stable and controlled on ELQUIS. Follow up with PCP 6/2023     3. Other acute pulmonary embolism, unspecified whether acute cor pulmonale present  Stable and controlled on ELQUIS. See A#  2  Follow up with PCP 6/2023     4. Malignant carcinoid tumors of other sites  Adverse effect of iron and its compounds, initial encounter  Iron deficiency anemia, unspecified  Intra-abdominal and pelvic swelling, mass and lump, unspecified site  Chronic/ Monitored/Stable on injection every  3 months- states no chemo/radiation  Followed by oncologist- Hilario Reyna    5. Prostate cancer  Chronic/ Monitored/Stable PSA levels per pt  S/P Prostate  surgery 2/14/2023  Routine) - New Request  Specialty Diagnoses / Procedures Referred By Contact Referred To Contact    Diagnoses  Prostate cancer (HCC)     Procedures  NM Bone Scan Whole Body Tomi Valdez MD  [address] [facility]  [address]  [place] [address]  Phone: [phone]  Fax: [phone]            6. Type 2 diabetes mellitus without complication, without long-term current use of insulin  Chronic/ Monitored/Stable on   diet modifications  directed.  Scheduled for labs and PCP 6/2023    7. Hypertension, unspecified type  Chronic and Ongoing.  States Lisinopril was stopped at 4/ 2023 due to hypotension  Instructed to etienne and record and discuss the need to restart with PCP IN 6/ 2023    8. Chronic kidney disease, stage 3a  Chronic/ Monitored/Stable on  as directed.  Followed by PCP    9. Hyperlipidemia, unspecified hyperlipidemia type  Chronic/ Monitored/Stable on  Lipitor as directed.  Followed by PCP    10. Gout, unspecified cause, unspecified chronicity, unspecified site  Chronic/ Monitored/Stable on  Allopurinol and Col chine as directed.  Followed by PCP    11. Over weight  Wt: 102 kg (224 lb 13.9 oz)  BMI: 29.67 kg/m²  Adj Wt: 88.7 kg (195 lb 10.2 oz)  BP: 142/80  Discussed and recommend  low fat/carb/chol diet. Cardio exercise as tolerated. Life style modifications.    I offered to discuss advanced care planning, including how to pick a person who would make decisions for you if you were unable to make them for yourself, called a health care power of , and what kind of decisions you might make such as use of life sustaining treatments such as ventilators and tube feeding when faced with a life limiting illness recorded on a living will that they will need to know. (How you want to be cared for as you near the end of your natural life)     X  Patient has advanced directives on file, which we reviewed, and they do not wish to make changes.    Provided Demario with a 5-10 year written  screening schedule and personal prevention plan. Recommendations were developed using the USPSTF age appropriate recommendations. Education, counseling, and referrals were provided as needed. After Visit Summary printed and given to patient which includes a list of additional screenings\tests needed.    Follow up in about 1 year (around 4/17/2024).    Kandi Haley NP

## 2023-04-21 ENCOUNTER — TELEPHONE (OUTPATIENT)
Dept: FAMILY MEDICINE | Facility: CLINIC | Age: 68
End: 2023-04-21
Payer: MEDICARE

## 2023-04-21 DIAGNOSIS — I82.4Y3 ACUTE DEEP VEIN THROMBOSIS (DVT) OF PROXIMAL VEIN OF BOTH LOWER EXTREMITIES: Primary | ICD-10-CM

## 2023-04-21 NOTE — TELEPHONE ENCOUNTER
----- Message from Yaima Aleman sent at 4/21/2023  1:49 PM CDT -----  Pt is requesting a nurse give him a call back at [phone]  Thx jm

## 2023-04-21 NOTE — TELEPHONE ENCOUNTER
Pt went to ER a month ago at [facility]. Had blood clots in lungs and legs. They put him on Eliquis 5 mg, and now he has 5 days left. He had wellness with Kandi earlier this week. States he was told when he was discharged to let his PCP know before he ran out of med so that she could refill it if needed or take him off. Please advise.

## 2023-05-01 ENCOUNTER — OFFICE VISIT (OUTPATIENT)
Dept: OPHTHALMOLOGY | Facility: CLINIC | Age: 68
End: 2023-05-01
Payer: MEDICARE

## 2023-05-01 DIAGNOSIS — H52.7 REFRACTIVE ERRORS: ICD-10-CM

## 2023-05-01 DIAGNOSIS — E11.9 DIABETES MELLITUS TYPE 2 WITHOUT RETINOPATHY: Primary | ICD-10-CM

## 2023-05-01 DIAGNOSIS — E11.36 DIABETIC CATARACT: ICD-10-CM

## 2023-05-01 DIAGNOSIS — E11.65 TYPE 2 DIABETES MELLITUS WITH HYPERGLYCEMIA, WITHOUT LONG-TERM CURRENT USE OF INSULIN: ICD-10-CM

## 2023-05-01 DIAGNOSIS — H40.013 AT LOW RISK FOR OPEN-ANGLE GLAUCOMA IN BOTH EYES: ICD-10-CM

## 2023-05-01 PROCEDURE — 2023F PR DILATED RETINAL EXAM W/O EVID OF RETINOPATHY: ICD-10-PCS | Mod: CPTII,S$GLB,, | Performed by: OPTOMETRIST

## 2023-05-01 PROCEDURE — 1159F PR MEDICATION LIST DOCUMENTED IN MEDICAL RECORD: ICD-10-PCS | Mod: CPTII,S$GLB,, | Performed by: OPTOMETRIST

## 2023-05-01 PROCEDURE — 92015 PR REFRACTION: ICD-10-PCS | Mod: S$GLB,,, | Performed by: OPTOMETRIST

## 2023-05-01 PROCEDURE — 99999 PR PBB SHADOW E&M-EST. PATIENT-LVL II: CPT | Mod: PBBFAC,,, | Performed by: OPTOMETRIST

## 2023-05-01 PROCEDURE — 2023F DILAT RTA XM W/O RTNOPTHY: CPT | Mod: CPTII,S$GLB,, | Performed by: OPTOMETRIST

## 2023-05-01 PROCEDURE — 4010F PR ACE/ARB THEARPY RXD/TAKEN: ICD-10-PCS | Mod: CPTII,S$GLB,, | Performed by: OPTOMETRIST

## 2023-05-01 PROCEDURE — 4010F ACE/ARB THERAPY RXD/TAKEN: CPT | Mod: CPTII,S$GLB,, | Performed by: OPTOMETRIST

## 2023-05-01 PROCEDURE — 92004 COMPRE OPH EXAM NEW PT 1/>: CPT | Mod: S$GLB,,, | Performed by: OPTOMETRIST

## 2023-05-01 PROCEDURE — 3044F PR MOST RECENT HEMOGLOBIN A1C LEVEL <7.0%: ICD-10-PCS | Mod: CPTII,S$GLB,, | Performed by: OPTOMETRIST

## 2023-05-01 PROCEDURE — 92004 PR EYE EXAM, NEW PATIENT,COMPREHESV: ICD-10-PCS | Mod: S$GLB,,, | Performed by: OPTOMETRIST

## 2023-05-01 PROCEDURE — 1159F MED LIST DOCD IN RCRD: CPT | Mod: CPTII,S$GLB,, | Performed by: OPTOMETRIST

## 2023-05-01 PROCEDURE — 92015 DETERMINE REFRACTIVE STATE: CPT | Mod: S$GLB,,, | Performed by: OPTOMETRIST

## 2023-05-01 PROCEDURE — 3044F HG A1C LEVEL LT 7.0%: CPT | Mod: CPTII,S$GLB,, | Performed by: OPTOMETRIST

## 2023-05-01 PROCEDURE — 99999 PR PBB SHADOW E&M-EST. PATIENT-LVL II: ICD-10-PCS | Mod: PBBFAC,,, | Performed by: OPTOMETRIST

## 2023-05-01 NOTE — PROGRESS NOTES
HPI     Diabetic Eye Exam     Additional comments: Lab Results       Component                Value               Date                       HGBA1C                   6.8 (H)             01/30/2023                      Comments    Pt. States he sees well with current glasses, no problems with PC.          Last edited by Lissette Douglas MA on 5/1/2023  2:21 PM.            Assessment /Plan     For exam results, see Encounter Report.    Diabetes mellitus type 2 without retinopathy    Type 2 diabetes mellitus with hyperglycemia, without long-term current use of insulin  -     Ambulatory referral/consult to Optometry    Diabetic cataract    At low risk for open-angle glaucoma in both eyes    Refractive errors      No Background Diabetic Retinopathy  Strict BG control, f/u w/ PCP, and annual DFE  Stressed importance of DM control to preserve vision    Moderate cataracts OU, not surgical  Follow annually.    Low IOP, large C/D, possible family history POAG    Dispense Final Rx for glasses.  RTC 1 month VF gOCT iop ck  Discussed above and answered questions.

## 2023-05-18 DIAGNOSIS — M10.9 GOUT, UNSPECIFIED CAUSE, UNSPECIFIED CHRONICITY, UNSPECIFIED SITE: ICD-10-CM

## 2023-05-18 RX ORDER — COLCHICINE 0.6 MG/1
TABLET ORAL
Qty: 90 TABLET | Refills: 0 | Status: SHIPPED | OUTPATIENT
Start: 2023-05-18 | End: 2023-06-14

## 2023-05-18 NOTE — TELEPHONE ENCOUNTER
Refill Routing Note   Medication(s) are not appropriate for processing by [facility] for the following reason(s):      Required labs outdated    [facility] action(s):  Defer Labs due            Appointments  past 12m or future 3m with PCP    Date Provider   Last Visit   5/31/2022 Sheeba Gonsales MD   Next Visit   6/30/2023 Sheeba Gonsales MD   ED visits in past 90 days: 0        Note composed:10:57 AM 05/18/2023

## 2023-05-18 NOTE — TELEPHONE ENCOUNTER
Care Due:                  Date            Visit Type   Department     Provider  --------------------------------------------------------------------------------                                EP -                              PRIMARY      [facility]  Last Visit: 05-      CARE ([facility])   MEDICINE       Sheeba Gonsales                               -                              PRIMARY      [facility]  Next Visit: 06-      CARE ([facility])   MEDICINE       Sheeba Gonsales                                                            Last  Test          Frequency    Reason                     Performed    Due Date  --------------------------------------------------------------------------------    Lipid Panel.  12 months..  atorvastatin.............  05- 05-    Uric Acid...  12 months..  allopurinoL, colchicine..  Not Found    Overdue    [employer] Embedded Care Due Messages. Reference number: [id number].   5/18/2023 8:31:21 AM CDT

## 2023-05-24 DIAGNOSIS — I10 HYPERTENSION, UNSPECIFIED TYPE: ICD-10-CM

## 2023-05-24 DIAGNOSIS — M10.9 GOUT, UNSPECIFIED CAUSE, UNSPECIFIED CHRONICITY, UNSPECIFIED SITE: ICD-10-CM

## 2023-05-24 RX ORDER — ATORVASTATIN CALCIUM 10 MG/1
TABLET, FILM COATED ORAL
Qty: 90 TABLET | Refills: 0 | Status: SHIPPED | OUTPATIENT
Start: 2023-05-24 | End: 2023-06-30 | Stop reason: SDUPTHER

## 2023-05-24 RX ORDER — LISINOPRIL AND HYDROCHLOROTHIAZIDE 20; 25 MG/1; MG/1
TABLET ORAL
Qty: 90 TABLET | Refills: 3 | Status: SHIPPED | OUTPATIENT
Start: 2023-05-24 | End: 2023-06-30

## 2023-05-24 RX ORDER — ALLOPURINOL 100 MG/1
100 TABLET ORAL DAILY
Qty: 90 TABLET | Refills: 3 | Status: SHIPPED | OUTPATIENT
Start: 2023-05-24 | End: 2024-05-23

## 2023-05-24 NOTE — TELEPHONE ENCOUNTER
No care due was identified.  [facility] Embedded Care Due Messages. Reference number: [id number].   5/24/2023 1:34:58 AM CDT

## 2023-05-24 NOTE — TELEPHONE ENCOUNTER
Refill Routing Note   Medication(s) are not appropriate for processing by [facility] for the following reason(s):      Required labs outdated  Required vitals abnormal    [facility] action(s):  Defer  Approve None identified          Appointments  past 12m or future 3m with PCP    Date Provider   Last Visit   5/31/2022 Sheeba Gonsales MD   Next Visit   6/30/2023 Sheeba Gonsales MD   ED visits in past 90 days: 0        Note composed:9:30 AM 05/24/2023

## 2023-06-14 DIAGNOSIS — M10.9 GOUT, UNSPECIFIED CAUSE, UNSPECIFIED CHRONICITY, UNSPECIFIED SITE: ICD-10-CM

## 2023-06-14 RX ORDER — COLCHICINE 0.6 MG/1
TABLET ORAL
Qty: 90 TABLET | Refills: 0 | Status: SHIPPED | OUTPATIENT
Start: 2023-06-14 | End: 2023-06-30 | Stop reason: SDUPTHER

## 2023-06-14 NOTE — TELEPHONE ENCOUNTER
No care due was identified.  [facility] Embedded Care Due Messages. Reference number: [id number].   6/14/2023 8:32:06 AM CDT

## 2023-06-14 NOTE — TELEPHONE ENCOUNTER
Refill Routing Note   Medication(s) are not appropriate for processing by [facility] for the following reason(s):      Required labs outdated    [facility] action(s):  Defer None identified          Appointments  past 12m or future 3m with PCP    Date Provider   Last Visit   5/31/2022 Sheeba Gonsales MD   Next Visit   6/30/2023 Sheeba Gonsales MD   ED visits in past 90 days: 0        Note composed:9:49 AM 06/14/2023

## 2023-06-30 ENCOUNTER — LAB VISIT (OUTPATIENT)
Dept: LAB | Facility: HOSPITAL | Age: 68
End: 2023-06-30
Attending: FAMILY MEDICINE
Payer: MEDICARE

## 2023-06-30 ENCOUNTER — OFFICE VISIT (OUTPATIENT)
Dept: FAMILY MEDICINE | Facility: CLINIC | Age: 68
End: 2023-06-30
Payer: MEDICARE

## 2023-06-30 VITALS
OXYGEN SATURATION: 97 % | RESPIRATION RATE: 16 BRPM | HEART RATE: 70 BPM | SYSTOLIC BLOOD PRESSURE: 110 MMHG | TEMPERATURE: 98 F | BODY MASS INDEX: 29.64 KG/M2 | DIASTOLIC BLOOD PRESSURE: 70 MMHG | WEIGHT: 224.63 LBS

## 2023-06-30 DIAGNOSIS — C61 PROSTATE CANCER: ICD-10-CM

## 2023-06-30 DIAGNOSIS — E78.5 HYPERLIPIDEMIA ASSOCIATED WITH TYPE 2 DIABETES MELLITUS: ICD-10-CM

## 2023-06-30 DIAGNOSIS — E11.69 HYPERLIPIDEMIA ASSOCIATED WITH TYPE 2 DIABETES MELLITUS: ICD-10-CM

## 2023-06-30 DIAGNOSIS — C25.9 MALIGNANT NEOPLASM OF PANCREAS, UNSPECIFIED LOCATION OF MALIGNANCY: ICD-10-CM

## 2023-06-30 DIAGNOSIS — I15.2 HYPERTENSION ASSOCIATED WITH DIABETES: ICD-10-CM

## 2023-06-30 DIAGNOSIS — M10.9 GOUT, UNSPECIFIED CAUSE, UNSPECIFIED CHRONICITY, UNSPECIFIED SITE: ICD-10-CM

## 2023-06-30 DIAGNOSIS — I82.4Y3 ACUTE DEEP VEIN THROMBOSIS (DVT) OF PROXIMAL VEIN OF BOTH LOWER EXTREMITIES: ICD-10-CM

## 2023-06-30 DIAGNOSIS — E11.59 HYPERTENSION ASSOCIATED WITH DIABETES: ICD-10-CM

## 2023-06-30 DIAGNOSIS — E11.9 TYPE 2 DIABETES MELLITUS WITHOUT COMPLICATION, WITHOUT LONG-TERM CURRENT USE OF INSULIN: Primary | ICD-10-CM

## 2023-06-30 DIAGNOSIS — E11.9 TYPE 2 DIABETES MELLITUS WITHOUT COMPLICATION, WITHOUT LONG-TERM CURRENT USE OF INSULIN: ICD-10-CM

## 2023-06-30 DIAGNOSIS — Z12.11 SCREENING FOR COLON CANCER: ICD-10-CM

## 2023-06-30 LAB
ALBUMIN SERPL BCP-MCNC: 3.6 G/DL (ref 3.5–5.2)
ALBUMIN/CREAT UR: 16.6 UG/MG (ref 0–30)
ALP SERPL-CCNC: 119 U/L (ref 55–135)
ALT SERPL W/O P-5'-P-CCNC: 48 U/L (ref 10–44)
ANION GAP SERPL CALC-SCNC: 8 MMOL/L (ref 8–16)
AST SERPL-CCNC: 21 U/L (ref 10–40)
BILIRUB SERPL-MCNC: 0.3 MG/DL (ref 0.1–1)
BUN SERPL-MCNC: 24 MG/DL (ref 8–23)
CALCIUM SERPL-MCNC: 10 MG/DL (ref 8.7–10.5)
CHLORIDE SERPL-SCNC: 107 MMOL/L (ref 95–110)
CHOLEST SERPL-MCNC: 125 MG/DL (ref 120–199)
CHOLEST/HDLC SERPL: 2.9 {RATIO} (ref 2–5)
CO2 SERPL-SCNC: 21 MMOL/L (ref 23–29)
CREAT SERPL-MCNC: 1.1 MG/DL (ref 0.5–1.4)
CREAT UR-MCNC: 229 MG/DL (ref 23–375)
ERYTHROCYTE [DISTWIDTH] IN BLOOD BY AUTOMATED COUNT: 14.6 % (ref 11.5–14.5)
EST. GFR  (NO RACE VARIABLE): >60 ML/MIN/1.73 M^2
ESTIMATED AVG GLUCOSE: 160 MG/DL (ref 68–131)
GLUCOSE SERPL-MCNC: 134 MG/DL (ref 70–110)
HBA1C MFR BLD: 7.2 % (ref 4–5.6)
HCT VFR BLD AUTO: 37.7 % (ref 40–54)
HDLC SERPL-MCNC: 43 MG/DL (ref 40–75)
HDLC SERPL: 34.4 % (ref 20–50)
HGB BLD-MCNC: 11.8 G/DL (ref 14–18)
LDLC SERPL CALC-MCNC: 63.2 MG/DL (ref 63–159)
MCH RBC QN AUTO: 26 PG (ref 27–31)
MCHC RBC AUTO-ENTMCNC: 31.3 G/DL (ref 32–36)
MCV RBC AUTO: 83 FL (ref 82–98)
MICROALBUMIN UR DL<=1MG/L-MCNC: 38 UG/ML
NONHDLC SERPL-MCNC: 82 MG/DL
PLATELET # BLD AUTO: 283 K/UL (ref 150–450)
PMV BLD AUTO: 12.4 FL (ref 9.2–12.9)
POTASSIUM SERPL-SCNC: 4.7 MMOL/L (ref 3.5–5.1)
PROT SERPL-MCNC: 7.7 G/DL (ref 6–8.4)
RBC # BLD AUTO: 4.53 M/UL (ref 4.6–6.2)
SODIUM SERPL-SCNC: 136 MMOL/L (ref 136–145)
TRIGL SERPL-MCNC: 94 MG/DL (ref 30–150)
TSH SERPL DL<=0.005 MIU/L-ACNC: 3.37 UIU/ML (ref 0.4–4)
WBC # BLD AUTO: 7.83 K/UL (ref 3.9–12.7)

## 2023-06-30 PROCEDURE — 82570 ASSAY OF URINE CREATININE: CPT | Performed by: FAMILY MEDICINE

## 2023-06-30 PROCEDURE — 1159F MED LIST DOCD IN RCRD: CPT | Mod: CPTII,S$GLB,, | Performed by: FAMILY MEDICINE

## 2023-06-30 PROCEDURE — 99214 PR OFFICE/OUTPT VISIT, EST, LEVL IV, 30-39 MIN: ICD-10-PCS | Mod: S$GLB,,, | Performed by: FAMILY MEDICINE

## 2023-06-30 PROCEDURE — 3008F PR BODY MASS INDEX (BMI) DOCUMENTED: ICD-10-PCS | Mod: CPTII,S$GLB,, | Performed by: FAMILY MEDICINE

## 2023-06-30 PROCEDURE — 3074F SYST BP LT 130 MM HG: CPT | Mod: CPTII,S$GLB,, | Performed by: FAMILY MEDICINE

## 2023-06-30 PROCEDURE — 99999 PR PBB SHADOW E&M-EST. PATIENT-LVL III: ICD-10-PCS | Mod: PBBFAC,,, | Performed by: FAMILY MEDICINE

## 2023-06-30 PROCEDURE — 3044F HG A1C LEVEL LT 7.0%: CPT | Mod: CPTII,S$GLB,, | Performed by: FAMILY MEDICINE

## 2023-06-30 PROCEDURE — 3288F PR FALLS RISK ASSESSMENT DOCUMENTED: ICD-10-PCS | Mod: CPTII,S$GLB,, | Performed by: FAMILY MEDICINE

## 2023-06-30 PROCEDURE — 4010F ACE/ARB THERAPY RXD/TAKEN: CPT | Mod: CPTII,S$GLB,, | Performed by: FAMILY MEDICINE

## 2023-06-30 PROCEDURE — 3078F PR MOST RECENT DIASTOLIC BLOOD PRESSURE < 80 MM HG: ICD-10-PCS | Mod: CPTII,S$GLB,, | Performed by: FAMILY MEDICINE

## 2023-06-30 PROCEDURE — 1126F AMNT PAIN NOTED NONE PRSNT: CPT | Mod: CPTII,S$GLB,, | Performed by: FAMILY MEDICINE

## 2023-06-30 PROCEDURE — 84443 ASSAY THYROID STIM HORMONE: CPT | Performed by: FAMILY MEDICINE

## 2023-06-30 PROCEDURE — 1159F PR MEDICATION LIST DOCUMENTED IN MEDICAL RECORD: ICD-10-PCS | Mod: CPTII,S$GLB,, | Performed by: FAMILY MEDICINE

## 2023-06-30 PROCEDURE — 1101F PT FALLS ASSESS-DOCD LE1/YR: CPT | Mod: CPTII,S$GLB,, | Performed by: FAMILY MEDICINE

## 2023-06-30 PROCEDURE — 80061 LIPID PANEL: CPT | Performed by: FAMILY MEDICINE

## 2023-06-30 PROCEDURE — 3074F PR MOST RECENT SYSTOLIC BLOOD PRESSURE < 130 MM HG: ICD-10-PCS | Mod: CPTII,S$GLB,, | Performed by: FAMILY MEDICINE

## 2023-06-30 PROCEDURE — 1101F PR PT FALLS ASSESS DOC 0-1 FALLS W/OUT INJ PAST YR: ICD-10-PCS | Mod: CPTII,S$GLB,, | Performed by: FAMILY MEDICINE

## 2023-06-30 PROCEDURE — 3288F FALL RISK ASSESSMENT DOCD: CPT | Mod: CPTII,S$GLB,, | Performed by: FAMILY MEDICINE

## 2023-06-30 PROCEDURE — 99214 OFFICE O/P EST MOD 30 MIN: CPT | Mod: S$GLB,,, | Performed by: FAMILY MEDICINE

## 2023-06-30 PROCEDURE — 3044F PR MOST RECENT HEMOGLOBIN A1C LEVEL <7.0%: ICD-10-PCS | Mod: CPTII,S$GLB,, | Performed by: FAMILY MEDICINE

## 2023-06-30 PROCEDURE — 3078F DIAST BP <80 MM HG: CPT | Mod: CPTII,S$GLB,, | Performed by: FAMILY MEDICINE

## 2023-06-30 PROCEDURE — 99999 PR PBB SHADOW E&M-EST. PATIENT-LVL III: CPT | Mod: PBBFAC,,, | Performed by: FAMILY MEDICINE

## 2023-06-30 PROCEDURE — 80053 COMPREHEN METABOLIC PANEL: CPT | Performed by: FAMILY MEDICINE

## 2023-06-30 PROCEDURE — 85027 COMPLETE CBC AUTOMATED: CPT | Performed by: FAMILY MEDICINE

## 2023-06-30 PROCEDURE — 3008F BODY MASS INDEX DOCD: CPT | Mod: CPTII,S$GLB,, | Performed by: FAMILY MEDICINE

## 2023-06-30 PROCEDURE — 4010F PR ACE/ARB THEARPY RXD/TAKEN: ICD-10-PCS | Mod: CPTII,S$GLB,, | Performed by: FAMILY MEDICINE

## 2023-06-30 PROCEDURE — 36415 COLL VENOUS BLD VENIPUNCTURE: CPT | Mod: PO | Performed by: FAMILY MEDICINE

## 2023-06-30 PROCEDURE — 1126F PR PAIN SEVERITY QUANTIFIED, NO PAIN PRESENT: ICD-10-PCS | Mod: CPTII,S$GLB,, | Performed by: FAMILY MEDICINE

## 2023-06-30 PROCEDURE — 83036 HEMOGLOBIN GLYCOSYLATED A1C: CPT | Performed by: FAMILY MEDICINE

## 2023-06-30 RX ORDER — INSULIN PUMP SYRINGE, 3 ML
EACH MISCELLANEOUS
Qty: 1 EACH | Refills: 0 | Status: SHIPPED | OUTPATIENT
Start: 2023-06-30

## 2023-06-30 RX ORDER — AMLODIPINE BESYLATE 10 MG/1
10 TABLET ORAL DAILY
Qty: 90 TABLET | Refills: 3 | Status: SHIPPED | OUTPATIENT
Start: 2023-06-30 | End: 2024-02-01

## 2023-06-30 RX ORDER — LISINOPRIL 20 MG/1
TABLET ORAL
Qty: 90 TABLET | Refills: 1 | Status: SHIPPED | OUTPATIENT
Start: 2023-06-30

## 2023-06-30 RX ORDER — ATORVASTATIN CALCIUM 10 MG/1
10 TABLET, FILM COATED ORAL DAILY
Qty: 90 TABLET | Refills: 3 | Status: SHIPPED | OUTPATIENT
Start: 2023-06-30

## 2023-06-30 RX ORDER — LANCETS
EACH MISCELLANEOUS
Qty: 120 EACH | Refills: 11 | Status: SHIPPED | OUTPATIENT
Start: 2023-06-30

## 2023-06-30 RX ORDER — COLCHICINE 0.6 MG/1
TABLET ORAL
Qty: 90 TABLET | Refills: 1 | Status: SHIPPED | OUTPATIENT
Start: 2023-06-30

## 2023-06-30 NOTE — PROGRESS NOTES
Subjective:      Patient ID: Demario Live is a 67 y.o. male.    Chief Complaint: Annual Exam (DM f/u)    HPI    Patient with pmhx of HLD, Gout, HTN, DM, DVT/PE (2023), prostate cancer and pancreatic cancer (diagnosed in 2021) here today for wellness visit      Pancreatic cancer following Dr. Power - getting monthly injections for treatment   Urology - following Dr. Valdez - possible      Past Medical History:   Diagnosis Date    Acute deep vein thrombosis (DVT) of both lower extremities 4/17/2023    Acute pulmonary embolism 4/17/2023    Chronic kidney disease, stage 3a 4/17/2023    HLD (hyperlipidemia) 2/15/2019    Hypertension     Neuroendocrine carcinoma of pancreas 4/21/2021    Type 2 diabetes mellitus without complication, without long-term current use of insulin 4/17/2023       Past Surgical History:   Procedure Laterality Date    LIPOMA RESECTION      PROSTATE SURGERY  02/14/2023       Family History   Problem Relation Age of Onset    Stroke Mother     Heart disease Father        Social History     Socioeconomic History    Marital status:     Number of children: 3   Occupational History    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   Tobacco Use    Smoking status: Never    Smokeless tobacco: Never   Substance and Sexual Activity    Alcohol use: Yes     Comment: occasionally/very rare     Drug use: No    Sexual activity: Yes     Partners: Female     Social Determinants of Health     Financial Resource Strain: Low Risk     Difficulty of Paying Living Expenses: Not very hard   Food Insecurity: No Food Insecurity    Worried About Running Out of Food in the Last Year: Never true    Ran Out of Food in the Last Year: Never true   Transportation Needs: No Transportation Needs    Lack of Transportation (Medical): No    Lack of Transportation (Non-Medical): No   Physical Activity: Inactive    Days of Exercise per Week: 0 days    Minutes of Exercise per Session: 0 min   Stress: No Stress Concern Present     Feeling of Stress : Not at all   Social Connections: Moderately Integrated    Frequency of Communication with Friends and Family: More than three times a week    Frequency of Social Gatherings with Friends and Family: More than three times a week    Attends [employer] Services: More than 4 times per year    Active Member of Clubs or Organizations: No    Attends Club or Organization Meetings: Never    Marital Status:    Housing Stability: Unknown    Unable to Pay for Housing in the Last Year: No    Unstable Housing in the Last Year: No       Health Maintenance Topics with due status: Not Due       Topic Last Completion Date    Hemoglobin A1c 01/30/2023    Foot Exam 03/22/2023    Eye Exam 05/01/2023    Low Dose Statin 05/24/2023    Influenza Vaccine Not Due       Medication List with Changes/Refills   New Medications    BLOOD SUGAR DIAGNOSTIC STRP    To check BG 1-2 times daily as needed, to use with insurance preferred meter    BLOOD-GLUCOSE METER KIT    To check BG 2-3 times daily, to use with insurance preferred meter    LANCETS MISC    To check BG 1- 2 times daily as needed, to use with insurance preferred meter    LISINOPRIL (PRINIVIL,ZESTRIL) 20 MG TABLET    Take as needed if blood pressure (top number) is above 140 or (bottom number) above 90.   Current Medications    ALLOPURINOL (ZYLOPRIM) 100 MG TABLET    TAKE 1 TABLET (100 MG TOTAL) BY MOUTH ONCE DAILY. TAKE DAILY TO PREVENT GOUT    OMEPRAZOLE 20 MG TBLD    Take 20 mg by mouth.   Changed and/or Refilled Medications    Modified Medication Previous Medication    AMLODIPINE (NORVASC) 10 MG TABLET amLODIPine (NORVASC) 10 MG tablet       Take 1 tablet (10 mg total) by mouth once daily.    Take 1 tablet (10 mg total) by mouth once daily.    APIXABAN (ELIQUIS) 5 MG TAB apixaban (ELIQUIS) 5 mg Tab       Take 1 tablet (5 mg total) by mouth 2 (two) times daily.    Take 1 tablet (5 mg total) by mouth 2 (two) times daily.    ATORVASTATIN (LIPITOR) 10 MG TABLET  atorvastatin (LIPITOR) 10 MG tablet       Take 1 tablet (10 mg total) by mouth once daily.    TAKE 1 TABLET BY MOUTH EVERY DAY    COLCHICINE (COLCRYS) 0.6 MG TABLET colchicine (COLCRYS) 0.6 mg tablet       TAKE 2 TABLETS BY MOUTH AT FIRST SIGN OF FLARE AND THEN 1 TABLET 1 HOUR LATER THEN 1 TABLET A DAY UNTIL RESOLVED    TAKE 2 TABLETS BY MOUTH AT FIRST SIGN OF FLARE AND THEN 1 TABLET 1 HOUR LATER THEN 1 TABLET A DAY UNTIL RESOLVED   Discontinued Medications    ASPIRIN (ECOTRIN) 81 MG EC TABLET    Take 81 mg by mouth once daily.    CEFDINIR (OMNICEF) 300 MG CAPSULE    Take 300 mg by mouth 2 (two) times daily.    LISINOPRIL-HYDROCHLOROTHIAZIDE (PRINZIDE,ZESTORETIC) 20-25 MG TAB    TAKE 1 TABLET BY MOUTH EVERY DAY    METHENAMINE (HIPREX) 1 GRAM TAB    Take 1 g by mouth 2 (two) times daily.    PROMETHAZINE (PHENERGAN) 25 MG TABLET    Take 25 mg by mouth every 6 (six) hours as needed.    SILDENAFIL (VIAGRA) 50 MG TABLET    TAKE ONE TABLET BY MOUTH DAILY AS NEEDED FOR ERECTILE DYSFUNCTION    TRAMADOL (ULTRAM) 50 MG TABLET           Review of patient's allergies indicates:  No Known Allergies    Review of Systems   Constitutional:  Negative for fever.   HENT:  Negative for congestion.    Eyes:  Negative for blurred vision.   Respiratory:  Negative for shortness of breath.    Cardiovascular:  Negative for chest pain and leg swelling.   Gastrointestinal:  Negative for abdominal pain, constipation and diarrhea.   Genitourinary:  Negative for dysuria.   Skin:  Negative for rash.   Neurological:  Negative for headaches.     Objective:     Vitals:    06/30/23 0834   BP: 110/70   Pulse: 70   Resp: 16   Temp: 97.9 °F (36.6 °C)     Body mass index is 29.64 kg/m².    Physical Exam  Vitals and nursing note reviewed.   Constitutional:       General: He is not in acute distress.     Appearance: He is well-developed.   HENT:      Head: Normocephalic and atraumatic.      Right Ear: External ear normal.      Left Ear: External ear normal.       Nose: Nose normal.   Eyes:      Conjunctiva/sclera: Conjunctivae normal.      Pupils: Pupils are equal, round, and reactive to light.   Neck:      Thyroid: No thyromegaly.   Cardiovascular:      Rate and Rhythm: Normal rate and regular rhythm.      Heart sounds: Normal heart sounds. No murmur heard.  Pulmonary:      Effort: Pulmonary effort is normal. No respiratory distress.      Breath sounds: Normal breath sounds. No wheezing or rales.   Chest:      Chest wall: No tenderness.   Abdominal:      General: Bowel sounds are normal. There is no distension.      Palpations: Abdomen is soft.      Tenderness: There is no abdominal tenderness.   Lymphadenopathy:      Cervical: No cervical adenopathy.   Skin:     General: Skin is warm and dry.   Neurological:      Mental Status: He is alert and oriented to person, place, and time.     Assessment and Plan:     Type 2 diabetes mellitus without complication, without long-term current use of insulin  -     Microalbumin/Creatinine Ratio, Urine  -     CBC Without Differential; Future; Expected date: 06/30/2023  -     Comprehensive Metabolic Panel; Future; Expected date: 06/30/2023  -     Hemoglobin A1C; Future; Expected date: 06/30/2023  -     Lipid Panel; Future; Expected date: 06/30/2023  -     TSH; Future; Expected date: 06/30/2023  -     blood sugar diagnostic Strp; To check BG 1-2 times daily as needed, to use with insurance preferred meter  Dispense: 120 strip; Refill: 11  -     blood-glucose meter kit; To check BG 2-3 times daily, to use with insurance preferred meter  Dispense: 1 each; Refill: 0  -     lancets Misc; To check BG 1- 2 times daily as needed, to use with insurance preferred meter  Dispense: 120 each; Refill: 11    Gout, unspecified cause, unspecified chronicity, unspecified site  -     colchicine (COLCRYS) 0.6 mg tablet; TAKE 2 TABLETS BY MOUTH AT FIRST SIGN OF FLARE AND THEN 1 TABLET 1 HOUR LATER THEN 1 TABLET A DAY UNTIL RESOLVED  Dispense: 90 tablet;  Refill: 1    Acute deep vein thrombosis (DVT) of proximal vein of both lower extremities  -     apixaban (ELIQUIS) 5 mg Tab; Take 1 tablet (5 mg total) by mouth 2 (two) times daily.  Dispense: 180 tablet; Refill: 3    Malignant neoplasm of pancreas, unspecified location of malignancy    Hyperlipidemia associated with type 2 diabetes mellitus  -     atorvastatin (LIPITOR) 10 MG tablet; Take 1 tablet (10 mg total) by mouth once daily.  Dispense: 90 tablet; Refill: 3  -     Lipid Panel; Future; Expected date: 06/30/2023    Hypertension associated with diabetes  -     amLODIPine (NORVASC) 10 MG tablet; Take 1 tablet (10 mg total) by mouth once daily.  Dispense: 90 tablet; Refill: 3  -     CBC Without Differential; Future; Expected date: 06/30/2023  -     Comprehensive Metabolic Panel; Future; Expected date: 06/30/2023  -     lisinopriL (PRINIVIL,ZESTRIL) 20 MG tablet; Take as needed if blood pressure (top number) is above 140 or (bottom number) above 90.  Dispense: 90 tablet; Refill: 1    Screening for colon cancer  -     Ambulatory referral/consult to Endo Procedure ; Future; Expected date: 07/01/2023    Prostate cancer    Age appropriate counseling  Above labs  Continue with current meds  Above stable   Following heme/onc and urology  - will discuss with hem/onc about continuing blood thinner   Diet changes, exercise and weight loss advised  Advised shingles vaccine and td at pharmacy   Colonoscopy utd at outside facility - will try to obtain records again  Refill medications   Not on diabetic medication - has kept number down due to lifestyle changes     Follow up in about 1 year (around 6/30/2024).

## 2023-07-03 ENCOUNTER — HOSPITAL ENCOUNTER (OUTPATIENT)
Dept: PREADMISSION TESTING | Facility: HOSPITAL | Age: 68
Discharge: HOME | End: 2023-07-03
Attending: INTERNAL MEDICINE
Payer: MEDICARE

## 2023-07-03 DIAGNOSIS — Z12.11 SCREENING FOR COLON CANCER: Primary | ICD-10-CM

## 2023-07-04 ENCOUNTER — E-CONSULT (OUTPATIENT)
Dept: CARDIOLOGY | Facility: CLINIC | Age: 68
End: 2023-07-04
Payer: MEDICARE

## 2023-07-04 DIAGNOSIS — I82.4Y3 ACUTE DEEP VEIN THROMBOSIS (DVT) OF PROXIMAL VEIN OF BOTH LOWER EXTREMITIES: ICD-10-CM

## 2023-07-04 DIAGNOSIS — I10 HYPERTENSION, UNSPECIFIED TYPE: Primary | ICD-10-CM

## 2023-07-04 DIAGNOSIS — E78.00 PURE HYPERCHOLESTEROLEMIA: ICD-10-CM

## 2023-07-04 DIAGNOSIS — E11.9 TYPE 2 DIABETES MELLITUS WITHOUT COMPLICATION, WITHOUT LONG-TERM CURRENT USE OF INSULIN: ICD-10-CM

## 2023-07-04 DIAGNOSIS — N18.31 CHRONIC KIDNEY DISEASE, STAGE 3A: ICD-10-CM

## 2023-07-04 PROCEDURE — 99451 NTRPROF PH1/NTRNET/EHR 5/>: CPT | Mod: S$GLB,,, | Performed by: INTERNAL MEDICINE

## 2023-07-04 PROCEDURE — 99451 PR INTERPROF, PHONE/INTERNET/EHR, CONSULT, >= 5MINS: ICD-10-PCS | Mod: S$GLB,,, | Performed by: INTERNAL MEDICINE

## 2023-07-04 NOTE — CONSULTS
[facility]  Response for E-Consult     Patient Name: Demario Live  MRN: [id number]  Primary Care Provider: Sheeba Gonsales MD   Requesting Provider: Aishwarya Norton NP  Consults    Findings: 66 y/o male with PMHx for HTN, HLP, CKD and recent DVT and pulmonary embolus 4-23. Pt referred for CV clearance for colonoscopy and is on Eliquis. Recommend on holding off any non urgent procedures until 6 months post pulmonary embolus.    I did not speak to the requesting provider verbally about this.     Total time of Consultation: 5 minute    Percentage of time spent on written/verbal discussion: 100%     *This eConsult is based on the clinical data available to me and is furnished without benefit of a physical examination. The eConsult will need to be interpreted in light of any clinical issues or changes in patient status not available to me at the time of filing this eConsults. Significant changes in patient condition or level of acuity should result in immediate formal consultation and reevaluation. Please alert me if you have further questions.    Thank you for your consult.     Howard Brown MD  [facility]

## 2023-07-05 RX ORDER — SODIUM, POTASSIUM,MAG SULFATES 17.5-3.13G
1 SOLUTION, RECONSTITUTED, ORAL ORAL DAILY
Qty: 1 KIT | Refills: 0 | Status: SHIPPED | OUTPATIENT
Start: 2023-07-05 | End: 2023-07-07

## 2023-07-17 PROBLEM — I26.99 ACUTE PULMONARY EMBOLISM: Status: RESOLVED | Noted: 2023-04-17 | Resolved: 2023-07-17

## 2023-08-02 ENCOUNTER — TELEPHONE (OUTPATIENT)
Dept: FAMILY MEDICINE | Facility: CLINIC | Age: 68
End: 2023-08-02
Payer: MEDICARE

## 2023-08-02 NOTE — TELEPHONE ENCOUNTER
Patient is calling to state that his apixaban (ELIQUIS) 5 mg Tab, medication is too expensive and his insurance company suggests a alternative Jantoven

## 2023-08-02 NOTE — TELEPHONE ENCOUNTER
----- Message from Bronson Castro sent at 8/2/2023  4:39 PM CDT -----  Contact: Demario  .Type:  Patient Returning Call    Who Called:Demario  Who Left Message for Patient:nurse  Does the patient know what this is regarding?:yes  Would the patient rather a call back or a response via [facility]? call  Best Call Back Number:.[phone]     Additional Information: returning missed call

## 2023-08-02 NOTE — TELEPHONE ENCOUNTER
----- Message from Dasia Gonzalez sent at 8/2/2023  4:29 PM CDT -----  Contact: Self  Patient is calling to state that his apixaban (ELIQUIS) 5 mg Tab, medication is too expensive and his insurance company suggests a alternative Jantoven. Please call the patient back to advise [phone]

## 2023-08-11 ENCOUNTER — TELEPHONE (OUTPATIENT)
Dept: FAMILY MEDICINE | Facility: CLINIC | Age: 68
End: 2023-08-11
Payer: MEDICARE

## 2023-08-11 NOTE — TELEPHONE ENCOUNTER
----- Message from Ashley Markham sent at 8/11/2023 10:49 AM CDT -----  Contact: Demario  Patient is calling to speak with someone regarding a prescription. Patient reports [employer] recommended another medication called Jantoven and request that the Doctor approve it before given. Please give patient a call back at [phone] when possible.  Thank you,  TH

## 2023-08-11 NOTE — TELEPHONE ENCOUNTER
Called pt. About medication listed below. No answer vm left for explanation. Message relayed to provider.

## 2023-08-17 ENCOUNTER — TELEPHONE (OUTPATIENT)
Dept: FAMILY MEDICINE | Facility: CLINIC | Age: 68
End: 2023-08-17
Payer: MEDICARE

## 2023-08-17 RX ORDER — LANCETS 30 GAUGE
EACH MISCELLANEOUS
COMMUNITY
Start: 2023-06-30

## 2023-08-17 RX ORDER — LANCETS 33 GAUGE
EACH MISCELLANEOUS
COMMUNITY
Start: 2023-06-30

## 2023-08-17 NOTE — TELEPHONE ENCOUNTER
----- Message from Marilee Krishna sent at 8/17/2023  9:34 AM CDT -----  Contact: Demario Zimmer is calling in regards to getting the status of an approval for medication Jantoven. Please call back at [phone]      [facility]/pharmacy #[id number] - [place] - [address] AT [facility]  [address] [place] [address]  Phone: [phone] Fax: [phone]          Thanks  SW

## 2023-08-21 ENCOUNTER — TELEPHONE (OUTPATIENT)
Dept: INTERNAL MEDICINE | Facility: CLINIC | Age: 68
End: 2023-08-21
Payer: MEDICARE

## 2023-08-21 DIAGNOSIS — I82.4Y3 ACUTE DEEP VEIN THROMBOSIS (DVT) OF PROXIMAL VEIN OF BOTH LOWER EXTREMITIES: ICD-10-CM

## 2023-08-21 NOTE — TELEPHONE ENCOUNTER
----- Message from Rhiannon Willard sent at 8/21/2023 12:18 PM CDT -----  Contact: Demario  .Patient is calling to speak with the nurse regarding medication . Reports has been calling and havent received a call back about apixaban (ELIQUIS) 5 mg Tab .  Please give patient a call back at .[phone].

## 2023-08-22 NOTE — TELEPHONE ENCOUNTER
Please advise him that this is a form of warfarin or coumadin. If we start this medication, he will need to be monitored by our coumadin clinic every 2 weeks to make sure his blood levels are within range. Would he be ok with this? How much is the eliquis?

## 2023-08-23 ENCOUNTER — TELEPHONE (OUTPATIENT)
Dept: FAMILY MEDICINE | Facility: CLINIC | Age: 68
End: 2023-08-23
Payer: MEDICARE

## 2023-08-23 NOTE — TELEPHONE ENCOUNTER
Pt states eliquis is over $200 , he not sure he will be comfortable with the changes ( seeing someone every 2 weeks) so I started a P.A to see if insurance would approve but if not pt states he can try to get money

## 2023-08-23 NOTE — TELEPHONE ENCOUNTER
----- Message from Margo Shaffer sent at 8/23/2023 11:00 AM CDT -----  Contact: Self [phone]  Patient is returning a phone call.    Who left a message for the patient: nurse    Does patient know what this is regarding:   eliquis  medication    Would you like a call back, or a response through your [facility] portal?:   call back    Comments:

## 2023-08-23 NOTE — TELEPHONE ENCOUNTER
----- Message from Karen Tera sent at 8/23/2023 10:47 AM CDT -----  Contact: self [phone]  Patient has called in several times concerning the medication apixaban (ELIQUIS) 5 mg Tab which he states he cannot not afford. [employer] said that he can take the Jantoven. The patient just needs the doctor to ok that he can take this medication. He has called and left messages and has been called back but still unresolved because he needs to speak with the doctor. He states this is unprofessional and it needs to be handled today. He states he knows she is leaving but she still should care about his health as well. He states he feels neglected and has been without his medication for going on 2 weeks. Please call back [phone]

## 2023-08-24 NOTE — TELEPHONE ENCOUNTER
----- Message from Sheeba Gonsales MD sent at 8/24/2023  3:37 PM CDT -----  Get patient and established care appointment with Dr. Abraham in November

## 2023-08-25 ENCOUNTER — TELEPHONE (OUTPATIENT)
Dept: FAMILY MEDICINE | Facility: CLINIC | Age: 68
End: 2023-08-25
Payer: MEDICARE

## 2023-09-05 ENCOUNTER — PATIENT MESSAGE (OUTPATIENT)
Dept: PREADMISSION TESTING | Facility: HOSPITAL | Age: 68
End: 2023-09-05

## 2023-09-05 ENCOUNTER — HOSPITAL ENCOUNTER (OUTPATIENT)
Dept: PREADMISSION TESTING | Facility: HOSPITAL | Age: 68
Discharge: HOME | End: 2023-09-05
Attending: INTERNAL MEDICINE
Payer: MEDICARE

## 2023-09-06 ENCOUNTER — TELEPHONE (OUTPATIENT)
Dept: FAMILY MEDICINE | Facility: CLINIC | Age: 68
End: 2023-09-06
Payer: MEDICARE

## 2023-09-06 NOTE — TELEPHONE ENCOUNTER
----- Message from Nyla Celaya sent at 9/6/2023 12:56 PM CDT -----  Contact: afshin Hanks  ..Type:  Patient Requesting Call    Who Called: afshin Hanks   Does the patient know what this is regarding?: Pt have questions about health and medications wanting to take  Would the patient rather a call back or a response via [facility]?  Call  Best Call Back Number: .[phone]  Additional Information:

## 2023-09-07 RX ORDER — SILDENAFIL 50 MG/1
50 TABLET, FILM COATED ORAL DAILY PRN
Qty: 30 TABLET | Refills: 0 | Status: SHIPPED | OUTPATIENT
Start: 2023-09-07 | End: 2024-02-01 | Stop reason: SDUPTHER

## 2023-09-25 ENCOUNTER — PATIENT MESSAGE (OUTPATIENT)
Dept: ADMINISTRATIVE | Facility: HOSPITAL | Age: 68
End: 2023-09-25
Payer: MEDICARE

## 2023-10-05 ENCOUNTER — TELEPHONE (OUTPATIENT)
Dept: INTERNAL MEDICINE | Facility: CLINIC | Age: 68
End: 2023-10-05
Payer: MEDICARE

## 2023-10-05 NOTE — TELEPHONE ENCOUNTER
----- Message from Ashleyzakia Abdifatah sent at 10/5/2023 12:42 PM CDT -----  Contact: Demario  Type:  Patient Requesting Call    Who Called:Demario  Regarding?:discuss appt info  Would the patient rather a call back or a response via [facility]? Call back  Best Call Back Number:[phone]  Additional Information: pt would like to discuss appt info with nurse or provider            Thanks  [employer]

## 2023-10-05 NOTE — TELEPHONE ENCOUNTER
Patient wanted to verify that Dr. Abraham was Dr. Gonsales's referral. He is to establish care in February.

## 2024-01-01 NOTE — PATIENT INSTRUCTIONS
Low-Salt Diet  This diet removes foods that are high in salt. It also limits the amount of salt you use when cooking. It is most often used for people with high blood pressure, edema (fluid retention), and kidney, liver, or heart disease.  Table salt contains the mineral sodium. Your body needs sodium to work normally. But too much sodium can make your health problems worse. Your healthcare provider is recommending a low-salt (also called low-sodium) diet for you. Your total daily allowance of salt is 1,500 to 2,300 milligrams (mg). It is less than 1 teaspoon of table salt. This means you can have only about 500 to 700 mg of sodium at each meal. People with certain health problems should limit salt intake to the lower end of the recommended range.    When you cook, dont add much salt. If you can cook without using salt, even better. Dont add salt to your food at the table.  When shopping, read food labels. Salt is often called sodium on the label. Choose foods that are salt-free, low salt, or very low salt. Note that foods with reduced salt may not lower your salt intake enough.    Beans, potatoes, and pasta  Ok: Dry beans, split peas, lentils, potatoes, rice, macaroni, pasta, spaghetti without added salt  Avoid: Potato chips, tortilla chips, and similar products  Breads and cereals  Ok: Low-sodium breads, rolls, cereals, and cakes; low-salt crackers, matzo crackers  Avoid: Salted crackers, pretzels, popcorn, Hungarian toast, pancakes, muffins  Dairy  Ok: Milk, chocolate milk, hot chocolate mix, low-salt cheeses, and yogurt  Avoid: Processed cheese and cheese spreads; Roquefort, Camembert, and cottage cheese; buttermilk, instant breakfast drink  Desserts  Ok: Ice cream, frozen yogurt, juice bars, gelatin, cookies and pies, sugar, honey, jelly, hard candy  Avoid: Most pies, cakes and cookies prepared or processed with salt; instant pudding  Drinks  Ok: Tea, coffee, fizzy (carbonated) drinks, juices  Avoid: Flavored  coffees, electrolyte replacement drinks, sports drinks  Meats  Ok: All fresh meat, fish, poultry, low-salt tuna, eggs, egg substitute  Avoid: Smoked, pickled, brine-cured, or salted meats and fish. This includes garsia, chipped beef, corned beef, hot dogs, deli meats, ham, kosher meats, salt pork, sausage, canned tuna, salted codfish, smoked salmon, herring, sardines, or anchovies.  Seasonings and spices  Ok: Most seasonings are okay. Good substitutes for salt include: fresh herb blends, hot sauce, lemon, garlic, mad[place], vinegar, dry mustard, parsley, cilantro, horseradish, tomato paste, regular margarine, mayonnaise, unsalted butter, cream cheese, vegetable oil, cream, low-salt salad dressing and gravy.  Avoid: Regular ketchup, relishes, pickles, soy sauce, teriyaki sauce, Worcestershire sauce, BBQ sauce, tartar sauce, meat tenderizer, chili sauce, regular gravy, regular salad dressing, salted butter  Soups  Ok: Low-salt soups and broths made with allowed foods  Avoid: Bouillon cubes, soups with smoked or salted meats, regular soup and broth  Vegetables  Ok: Most vegetables are okay; also low-salt tomato and vegetable juices  Avoid: Sauerkraut and other brine-soaked vegetables; pickles and other pickled vegetables; tomato juice, olives  Date Last Reviewed: 8/1/2016 © 2000-2017 [employer]. [address] [place] [address]. All rights reserved. This information is not intended as a substitute for professional medical care. Always follow your healthcare professional's instructions.          Established High Blood Pressure    High blood pressure (hypertension) is a chronic disease. Often, healthcare providers dont know what causes it. But it can be caused by certain health conditions and medicines.  If you have high blood pressure, you may not have any symptoms. If you do have symptoms, they may include headache, dizziness, changes in your vision, chest pain, and shortness of breath. But even  without symptoms, high blood pressure thats not treated raises your risk for heart attack and stroke. High blood pressure is a serious health risk and shouldnt be ignored.  A blood pressure reading is made up of two numbers: a higher number over a lower number. The top number is the systolic pressure. The bottom number is the diastolic pressure. A normal blood pressure is a systolic pressure of  less than 120 over a diastolic pressure of less than 80. You will see your blood pressure readings written together. For example, a person with a systolic pressure of 188 and a diastolic pressure of 78 will have 118/78 written in the medical record.  High blood pressure is when either the top number is 140 or higher, or the bottom number is 90 or higher. This must be the result when taking your blood pressure a number of times. The blood pressures between normal and high are called prehypertension.  Home care  If you have high blood pressure, you should do what is listed below to lower your blood pressure. If you are taking medicines for high blood pressure, these methods may reduce or end your need for medicines in the future.  · Begin a weight-loss program if you are overweight.  · Cut back on how much salt you get in your diet. Heres how to do this:  ¨ Dont eat foods that have a lot of salt. These include olives, pickles, smoked meats, and salted potato chips.  ¨ Dont add salt to your food at the table.  ¨ Use only small amounts of salt when cooking.  · Start an exercise program. Talk with your healthcare provider about the type of exercise program that would be best for you. It doesn't have to be hard. Even brisk walking for 20 minutes 3 times a week is a good form of exercise.  · Dont take medicines that stimulate the heart. This includes many over-the-counter cold and sinus decongestant pills and sprays, as well as diet pills. Check the warnings about hypertension on the label. Before buying any over-the-counter  medicines or supplements, always ask the pharmacist about the product's potential interaction with your high blood pressure and your high blood pressure medicines.  · Stimulants such as amphetamine or cocaine could be deadly for someone with high blood pressure. Never take these.  · Limit how much caffeine you get in your diet. Switch to caffeine-free products.  · Stop smoking. If you are a long-time smoker, this can be hard. Talk to your healthcare provider about medicines and nicotine replacement options to help you. Also, enroll in a stop-smoking program to make it more likely that you will quit for good.  · Learn how to handle stress. This is an important part of any program to lower blood pressure. Learn about relaxation methods like meditation, yoga, or biofeedback.  · If your provider prescribed medicines, take them exactly as directed. Missing doses may cause your blood pressure get out of control.  · If you miss a dose or doses, check with your healthcare provider or pharmacist about what to do.  · Consider buying an automatic blood pressure machine. Ask your provider for a recommendation. You can get one of these at most pharmacies.     The American Heart Association recommends the following guidelines for home blood pressure monitoring:  · Don't smoke or drink coffee for 30 minutes before taking your blood pressure.  · Go to the bathroom before the test.  · Relax for 5 minutes before taking the measurement.  · Sit with your back supported (don't sit on a couch or soft chair); keep your feet on the floor uncrossed. Place your arm on a solid flat surface (like a table) with the upper part of the arm at heart level. Place the middle of the cuff directly above the eye of the elbow. Check the monitor's instruction manual for an illustration.  · Take multiple readings. When you measure, take 2 to 3 readings one minute apart and record all of the results.  · Take your blood pressure at the same time every day,  or as your healthcare provider recommends.  · Record the date, time, and blood pressure reading.  · Take the record with you to your next medical appointment. If your blood pressure monitor has a built-in memory, simply take the monitor with you to your next appointment.  · Call your provider if you have several high readings. Don't be frightened by a single high blood pressure reading, but if you get several high readings, check in with your healthcare provider.  · Note: When blood pressure reaches a systolic (top number) of 180 or higher OR diastolic (bottom number) of 110 or higher, seek emergency medical treatment.  Follow-up care  You will need to see your healthcare provider regularly. This is to check your blood pressure and to make changes to your medicines. Make a follow-up appointment as directed. Bring the record of your home blood pressure readings to the appointment.  When to seek medical advice  Call your healthcare provider right away if any of these occur:  · Blood pressure reaches a systolic (upper number) of 180 or higher OR a diastolic (bottom number) of 110 or higher  · Chest pain or shortness of breath  · Severe headache  · Throbbing or rushing sound in the ears  · Nosebleed  · Sudden severe pain in your belly (abdomen)  · Extreme drowsiness, confusion, or fainting  · Dizziness or spinning sensation (vertigo)  · Weakness of an arm or leg or one side of the face  · You have problems speaking or seeing   Date Last Reviewed: 12/1/2016  © 3995-8417 [employer]. [address] [place] [address], [place] [address]. All rights reserved. This information is not intended as a substitute for professional medical care. Always follow your healthcare professional's instructions.           No

## 2024-02-01 ENCOUNTER — OFFICE VISIT (OUTPATIENT)
Dept: INTERNAL MEDICINE | Facility: CLINIC | Age: 69
End: 2024-02-01
Payer: MEDICARE

## 2024-02-01 ENCOUNTER — LAB VISIT (OUTPATIENT)
Dept: LAB | Facility: HOSPITAL | Age: 69
End: 2024-02-01
Attending: FAMILY MEDICINE
Payer: MEDICARE

## 2024-02-01 VITALS
BODY MASS INDEX: 29.13 KG/M2 | WEIGHT: 220.81 LBS | SYSTOLIC BLOOD PRESSURE: 138 MMHG | RESPIRATION RATE: 20 BRPM | DIASTOLIC BLOOD PRESSURE: 82 MMHG | HEART RATE: 108 BPM | TEMPERATURE: 97 F | OXYGEN SATURATION: 99 %

## 2024-02-01 DIAGNOSIS — C61 PROSTATE CANCER: ICD-10-CM

## 2024-02-01 DIAGNOSIS — C7A.098 MALIGNANT CARCINOID TUMORS OF OTHER SITES: ICD-10-CM

## 2024-02-01 DIAGNOSIS — Z87.39 PERSONAL HISTORY OF GOUT: ICD-10-CM

## 2024-02-01 DIAGNOSIS — Z23 NEED FOR VACCINATION: ICD-10-CM

## 2024-02-01 DIAGNOSIS — E11.65 TYPE 2 DIABETES MELLITUS WITH HYPERGLYCEMIA, WITHOUT LONG-TERM CURRENT USE OF INSULIN: ICD-10-CM

## 2024-02-01 DIAGNOSIS — I82.463 ACUTE DEEP VEIN THROMBOSIS (DVT) OF CALF MUSCLE VEIN OF BOTH LOWER EXTREMITIES: ICD-10-CM

## 2024-02-01 DIAGNOSIS — I10 HYPERTENSION, UNSPECIFIED TYPE: ICD-10-CM

## 2024-02-01 DIAGNOSIS — E78.5 HYPERLIPIDEMIA, UNSPECIFIED HYPERLIPIDEMIA TYPE: ICD-10-CM

## 2024-02-01 DIAGNOSIS — I26.99 PULMONARY EMBOLISM WITHOUT ACUTE COR PULMONALE, UNSPECIFIED CHRONICITY, UNSPECIFIED PULMONARY EMBOLISM TYPE: Primary | ICD-10-CM

## 2024-02-01 DIAGNOSIS — C7A.8 NEUROENDOCRINE CARCINOMA OF PANCREAS: ICD-10-CM

## 2024-02-01 DIAGNOSIS — D64.9 ANEMIA, UNSPECIFIED TYPE: ICD-10-CM

## 2024-02-01 DIAGNOSIS — N52.9 ERECTILE DYSFUNCTION, UNSPECIFIED ERECTILE DYSFUNCTION TYPE: ICD-10-CM

## 2024-02-01 DIAGNOSIS — I82.4Y3 ACUTE DEEP VEIN THROMBOSIS (DVT) OF PROXIMAL VEIN OF BOTH LOWER EXTREMITIES: ICD-10-CM

## 2024-02-01 LAB
BASOPHILS # BLD AUTO: 0.03 K/UL (ref 0–0.2)
BASOPHILS NFR BLD: 0.6 % (ref 0–1.9)
DIFFERENTIAL METHOD BLD: ABNORMAL
EOSINOPHIL # BLD AUTO: 0.2 K/UL (ref 0–0.5)
EOSINOPHIL NFR BLD: 3.8 % (ref 0–8)
ERYTHROCYTE [DISTWIDTH] IN BLOOD BY AUTOMATED COUNT: 15.6 % (ref 11.5–14.5)
HCT VFR BLD AUTO: 42.3 % (ref 40–54)
HGB BLD-MCNC: 12.9 G/DL (ref 14–18)
IMM GRANULOCYTES # BLD AUTO: 0.01 K/UL (ref 0–0.04)
IMM GRANULOCYTES NFR BLD AUTO: 0.2 % (ref 0–0.5)
LYMPHOCYTES # BLD AUTO: 1.3 K/UL (ref 1–4.8)
LYMPHOCYTES NFR BLD: 25 % (ref 18–48)
MCH RBC QN AUTO: 26.3 PG (ref 27–31)
MCHC RBC AUTO-ENTMCNC: 30.5 G/DL (ref 32–36)
MCV RBC AUTO: 86 FL (ref 82–98)
MONOCYTES # BLD AUTO: 0.4 K/UL (ref 0.3–1)
MONOCYTES NFR BLD: 7.5 % (ref 4–15)
NEUTROPHILS # BLD AUTO: 3.3 K/UL (ref 1.8–7.7)
NEUTROPHILS NFR BLD: 62.9 % (ref 38–73)
NRBC BLD-RTO: 0 /100 WBC
PLATELET # BLD AUTO: 230 K/UL (ref 150–450)
PMV BLD AUTO: 13.1 FL (ref 9.2–12.9)
RBC # BLD AUTO: 4.91 M/UL (ref 4.6–6.2)
WBC # BLD AUTO: 5.31 K/UL (ref 3.9–12.7)

## 2024-02-01 PROCEDURE — 80053 COMPREHEN METABOLIC PANEL: CPT | Performed by: FAMILY MEDICINE

## 2024-02-01 PROCEDURE — 99999 PR PBB SHADOW E&M-EST. PATIENT-LVL III: CPT | Mod: PBBFAC,,, | Performed by: FAMILY MEDICINE

## 2024-02-01 PROCEDURE — 90694 VACC AIIV4 NO PRSRV 0.5ML IM: CPT | Mod: S$GLB,,, | Performed by: FAMILY MEDICINE

## 2024-02-01 PROCEDURE — 85025 COMPLETE CBC W/AUTO DIFF WBC: CPT | Performed by: FAMILY MEDICINE

## 2024-02-01 PROCEDURE — 83036 HEMOGLOBIN GLYCOSYLATED A1C: CPT | Performed by: FAMILY MEDICINE

## 2024-02-01 PROCEDURE — 83540 ASSAY OF IRON: CPT | Performed by: FAMILY MEDICINE

## 2024-02-01 PROCEDURE — G0008 ADMIN INFLUENZA VIRUS VAC: HCPCS | Mod: S$GLB,,, | Performed by: FAMILY MEDICINE

## 2024-02-01 PROCEDURE — 99214 OFFICE O/P EST MOD 30 MIN: CPT | Mod: S$GLB,,, | Performed by: FAMILY MEDICINE

## 2024-02-01 PROCEDURE — 84550 ASSAY OF BLOOD/URIC ACID: CPT | Performed by: FAMILY MEDICINE

## 2024-02-01 PROCEDURE — 36415 COLL VENOUS BLD VENIPUNCTURE: CPT | Performed by: FAMILY MEDICINE

## 2024-02-01 PROCEDURE — 82728 ASSAY OF FERRITIN: CPT | Performed by: FAMILY MEDICINE

## 2024-02-01 RX ORDER — SILDENAFIL 50 MG/1
50 TABLET, FILM COATED ORAL DAILY PRN
Qty: 30 TABLET | Refills: 0 | Status: SHIPPED | OUTPATIENT
Start: 2024-02-01 | End: 2025-01-31

## 2024-02-01 NOTE — PROGRESS NOTES
Subjective:       Patient ID: Demario Live is a 68 y.o. male.    Chief Complaint: Establish Care    HPI    Patient Active Problem List   Diagnosis    Hypertension    HLD (hyperlipidemia)    Erectile dysfunction    Gout    Chronic kidney disease, stage 3a    Prostate cancer    Acute deep vein thrombosis (DVT) of both lower extremities    Type 2 diabetes mellitus without complication, without long-term current use of insulin    Malignant carcinoid tumors of other sites    Over weight    Malignant neoplasm of pancreas, unspecified location of malignancy       Past Medical History:   Diagnosis Date    Acute deep vein thrombosis (DVT) of both lower extremities 4/17/2023    Acute pulmonary embolism 4/17/2023    Chronic kidney disease, stage 3a 4/17/2023    HLD (hyperlipidemia) 2/15/2019    Hypertension     Neuroendocrine carcinoma of pancreas 4/21/2021    Type 2 diabetes mellitus without complication, without long-term current use of insulin 4/17/2023       Past Surgical History:   Procedure Laterality Date    LIPOMA RESECTION      PROSTATE SURGERY  02/14/2023       Family History   Problem Relation Age of Onset    Stroke Mother     Heart disease Father        Social History     Tobacco Use   Smoking Status Never    Passive exposure: Past   Smokeless Tobacco Never       Wt Readings from Last 5 Encounters:   02/01/24 100.1 kg (220 lb 12.7 oz)   06/30/23 101.9 kg (224 lb 10.4 oz)   04/17/23 102 kg (224 lb 13.9 oz)   04/03/23 101.8 kg (224 lb 6.9 oz)   03/22/23 101.6 kg (223 lb 15.8 oz)       For further HPI details, see assessment and plan.    Review of Systems  patient was doing well and has no acute complaints  Objective:      Vitals:    02/01/24 0957   BP: 138/82   Pulse: 108   Resp: 20   Temp: 97.3 °F (36.3 °C)       Physical Exam  Constitutional:       General: He is not in acute distress.     Appearance: He is not ill-appearing.   Pulmonary:      Effort: Pulmonary effort is normal. No respiratory distress.    Neurological:      General: No focal deficit present.      Mental Status: He is alert.   Psychiatric:         Mood and Affect: Mood normal.         Behavior: Behavior normal.         Assessment:       1. Pulmonary embolism without acute cor pulmonale, unspecified chronicity, unspecified pulmonary embolism type    2. Acute deep vein thrombosis (DVT) of calf muscle vein of both lower extremities    3. Malignant carcinoid tumors of other sites    4. Hypertension, unspecified type    5. Type 2 diabetes mellitus with hyperglycemia, without long-term current use of insulin    6. Hyperlipidemia, unspecified hyperlipidemia type    7. Neuroendocrine carcinoma of pancreas    8. Prostate cancer    9. Anemia, unspecified type    10. Erectile dysfunction, unspecified erectile dysfunction type    11. Personal history of gout    12. Acute deep vein thrombosis (DVT) of proximal vein of both lower extremities        Plan:     Patient presents to establish care with a new primary care physician    Reviewed his primary problem list medical history and surgical history and family history with the patient.  Reviewed medication list.  At present time should be 100% accurate.    Hypertension  Patient is on amlodipine 10.  Had previously also taking lisinopril.  His pressure is controlled.  I would prefer he take lisinopril given his diabetic status.  We will discontinue amlodipine and restart lisinopril.  I encourage he monitors his home blood pressure readings.  If his pressures start running above 140/90 we may need to titrate the dose of lisinopril.  I want him to monitor for any swelling of the lips the mouth or the tongue.  As angioedema is a rare but potential side effect of lisinopril.    History of gout   He is on allopurinol 100 mg daily   He also has colchicine but only utilizes such for flare-ups.  We will update his uric acid level and renal function and titrate his dose of allopurinol accordingly.    Type 2 diabetes  Diet  controlled   At present time he has on no glucose controlling medications.  Plan to update his hemoglobin A1c.  If he has not at goal we must consider initiation of diabetic medication    Hyperlipidemia  Patient is on Lipitor 10 mg   Cholesterol is controlled.  We will continue to monitor liver function.  We will recheck his lipids again in roughly 6 months    Erectile dysfunction  Patient has taken Viagra 50 mg on an as needed basis.  Tolerated and benefitted.  Rare use.  We will provide refill.    Patient was listed as having chronic kidney disease stage IIIA   Recent GFR was above 60.  I suspect he has on the border between CKD 2 and 3.  We will continue to monitor.    History of DVTs and pulmonary embolism   Diagnosed April 3, 2023.  Patient is anticoagulated with Eliquis.  No bleeding issues reported.  Anticoagulation was managed by his previous primary care physician.  I am uncertain to the duration of treatment recommendation.  Would encouraged to seek guidance on this matter with his hematologist.    History of prostate cancer  Patient was followed by [facility] Dr. Valdez [facility].  Continue follow up with a specialist.  Patient has had no specific prostate cancer treatment.  He is just doing monitoring at present time    Malignant carcinoid tumor of pancreas  Patient was followed by Hematology oncologist Dr. Hilario Power.   Patient reports he gets monthly treatments via his hematologist.  Continue as per his specialist    Patient was intended to have colonoscopy was delayed secondary to recent pulmonary embolism.  He will inquire about the anticoagulation status with his hematologist and we will order colon cancer screening at our next encounter.    Patient is due for a number of vaccinations   This includes flu, RSV, shingles, tetanus, COVID.  We will start this process and initiate a flu shot today and give him the shingles shot slip so he can get that at his convenience.    3 month f/u      This note was verbally dictated, please excuse any type errors.

## 2024-02-02 LAB
ALBUMIN SERPL BCP-MCNC: 3.5 G/DL (ref 3.5–5.2)
ALP SERPL-CCNC: 148 U/L (ref 55–135)
ALT SERPL W/O P-5'-P-CCNC: 83 U/L (ref 10–44)
ANION GAP SERPL CALC-SCNC: 9 MMOL/L (ref 8–16)
AST SERPL-CCNC: 47 U/L (ref 10–40)
BILIRUB SERPL-MCNC: 0.2 MG/DL (ref 0.1–1)
BUN SERPL-MCNC: 17 MG/DL (ref 8–23)
CALCIUM SERPL-MCNC: 9.5 MG/DL (ref 8.7–10.5)
CHLORIDE SERPL-SCNC: 106 MMOL/L (ref 95–110)
CO2 SERPL-SCNC: 23 MMOL/L (ref 23–29)
CREAT SERPL-MCNC: 1.1 MG/DL (ref 0.5–1.4)
EST. GFR  (NO RACE VARIABLE): >60 ML/MIN/1.73 M^2
ESTIMATED AVG GLUCOSE: 143 MG/DL (ref 68–131)
FERRITIN SERPL-MCNC: 32 NG/ML (ref 20–300)
GLUCOSE SERPL-MCNC: 183 MG/DL (ref 70–110)
HBA1C MFR BLD: 6.6 % (ref 4–5.6)
IRON SERPL-MCNC: 39 UG/DL (ref 45–160)
POTASSIUM SERPL-SCNC: 4.4 MMOL/L (ref 3.5–5.1)
PROT SERPL-MCNC: 7.6 G/DL (ref 6–8.4)
SATURATED IRON: 9 % (ref 20–50)
SODIUM SERPL-SCNC: 138 MMOL/L (ref 136–145)
TOTAL IRON BINDING CAPACITY: 450 UG/DL (ref 250–450)
TRANSFERRIN SERPL-MCNC: 304 MG/DL (ref 200–375)
URATE SERPL-MCNC: 4.7 MG/DL (ref 3.4–7)

## 2024-04-16 DIAGNOSIS — E11.59 HYPERTENSION ASSOCIATED WITH DIABETES: ICD-10-CM

## 2024-04-16 DIAGNOSIS — I15.2 HYPERTENSION ASSOCIATED WITH DIABETES: ICD-10-CM

## 2024-04-16 RX ORDER — LISINOPRIL 20 MG/1
TABLET ORAL
Qty: 90 TABLET | Refills: 1 | Status: SHIPPED | OUTPATIENT
Start: 2024-04-16

## 2024-04-16 NOTE — TELEPHONE ENCOUNTER
----- Message from Alfredito Haro sent at 4/16/2024 10:40 AM CDT -----  Contact: Demario  Type:  RX Refill Request    Who Called: Demario  Refill or New Rx:refill  RX Name and Strength: lisinopriL (PRINIVIL,ZESTRIL) 20 MG tablet  How is the patient currently taking it? (ex. 1XDay):  Is this a 30 day or 90 day RX:  Preferred Pharmacy with phone number:  [facility]/pharmacy #[id number] - [place] - [address] [facility]  [address] [place], [place] [address] [address]  Phone: [phone] Fax: [phone]      Local or Mail Order:local  Ordering Provider:Wili  Would the patient rather a call back or a response via [facility]? call  Best Call Back Number:[phone]  Additional Information:

## 2024-04-16 NOTE — TELEPHONE ENCOUNTER
No care due was identified.  [facility] Embedded Care Due Messages. Reference number: [id number].   4/16/2024 10:54:36 AM CDT

## 2024-05-09 DIAGNOSIS — E78.5 HYPERLIPIDEMIA ASSOCIATED WITH TYPE 2 DIABETES MELLITUS: ICD-10-CM

## 2024-05-09 DIAGNOSIS — E11.69 HYPERLIPIDEMIA ASSOCIATED WITH TYPE 2 DIABETES MELLITUS: ICD-10-CM

## 2024-05-09 RX ORDER — ATORVASTATIN CALCIUM 10 MG/1
10 TABLET, FILM COATED ORAL DAILY
Qty: 90 TABLET | Refills: 3 | Status: SHIPPED | OUTPATIENT
Start: 2024-05-09

## 2024-05-09 NOTE — TELEPHONE ENCOUNTER
----- Message from Ivis Villanueva MA sent at 5/9/2024 12:50 PM CDT -----  Type: RX Refill Request    Who Called:    Have you contacted your pharmacy:    Refill    RX Name and Strength:  atorvastatin (LIPITOR) 10 MG tablet  Preferred Pharmacy with phone number:  [id number] [place] [facility] [place] [facility] [address] [facility].  [phone]    Local or Mail Order:    Would the patient rather a call back or a response via [facility]?    Best Call Back Number:   [phone]  Additional Information:    Thank you.

## 2024-05-09 NOTE — TELEPHONE ENCOUNTER
No care due was identified.  [facility] Embedded Care Due Messages. Reference number: [id number].   5/09/2024 1:42:13 PM CDT

## 2024-07-03 DIAGNOSIS — E11.9 TYPE 2 DIABETES MELLITUS WITHOUT COMPLICATION: ICD-10-CM

## 2024-08-07 ENCOUNTER — OFFICE VISIT (OUTPATIENT)
Dept: INTERNAL MEDICINE | Facility: CLINIC | Age: 69
End: 2024-08-07
Payer: MEDICARE

## 2024-08-07 ENCOUNTER — LAB VISIT (OUTPATIENT)
Dept: LAB | Facility: HOSPITAL | Age: 69
End: 2024-08-07
Attending: FAMILY MEDICINE
Payer: MEDICARE

## 2024-08-07 VITALS
TEMPERATURE: 97 F | OXYGEN SATURATION: 98 % | WEIGHT: 220 LBS | BODY MASS INDEX: 29.16 KG/M2 | SYSTOLIC BLOOD PRESSURE: 138 MMHG | HEIGHT: 73 IN | HEART RATE: 88 BPM | DIASTOLIC BLOOD PRESSURE: 82 MMHG

## 2024-08-07 DIAGNOSIS — Z79.899 ENCOUNTER FOR LONG-TERM (CURRENT) USE OF MEDICATIONS: ICD-10-CM

## 2024-08-07 DIAGNOSIS — E78.5 HYPERLIPIDEMIA ASSOCIATED WITH TYPE 2 DIABETES MELLITUS: ICD-10-CM

## 2024-08-07 DIAGNOSIS — C25.9 MALIGNANT NEOPLASM OF PANCREAS, UNSPECIFIED LOCATION OF MALIGNANCY: ICD-10-CM

## 2024-08-07 DIAGNOSIS — C7A.098 MALIGNANT CARCINOID TUMORS OF OTHER SITES: ICD-10-CM

## 2024-08-07 DIAGNOSIS — C61 PROSTATE CANCER: ICD-10-CM

## 2024-08-07 DIAGNOSIS — E11.69 HYPERLIPIDEMIA ASSOCIATED WITH TYPE 2 DIABETES MELLITUS: ICD-10-CM

## 2024-08-07 DIAGNOSIS — I15.2 HYPERTENSION ASSOCIATED WITH DIABETES: ICD-10-CM

## 2024-08-07 DIAGNOSIS — N52.9 ERECTILE DYSFUNCTION, UNSPECIFIED ERECTILE DYSFUNCTION TYPE: ICD-10-CM

## 2024-08-07 DIAGNOSIS — I10 HYPERTENSION, UNSPECIFIED TYPE: ICD-10-CM

## 2024-08-07 DIAGNOSIS — Z12.11 SCREENING FOR COLON CANCER: Primary | ICD-10-CM

## 2024-08-07 DIAGNOSIS — E11.59 HYPERTENSION ASSOCIATED WITH DIABETES: ICD-10-CM

## 2024-08-07 DIAGNOSIS — M10.9 GOUT, UNSPECIFIED CAUSE, UNSPECIFIED CHRONICITY, UNSPECIFIED SITE: ICD-10-CM

## 2024-08-07 PROBLEM — N18.31 CHRONIC KIDNEY DISEASE, STAGE 3A: Status: RESOLVED | Noted: 2023-04-17 | Resolved: 2024-08-07

## 2024-08-07 LAB
ALBUMIN SERPL BCP-MCNC: 3.1 G/DL (ref 3.5–5.2)
ALP SERPL-CCNC: 228 U/L (ref 55–135)
ALT SERPL W/O P-5'-P-CCNC: 128 U/L (ref 10–44)
ANION GAP SERPL CALC-SCNC: 6 MMOL/L (ref 8–16)
AST SERPL-CCNC: 76 U/L (ref 10–40)
BASOPHILS # BLD AUTO: 0.02 K/UL (ref 0–0.2)
BASOPHILS NFR BLD: 0.3 % (ref 0–1.9)
BILIRUB SERPL-MCNC: 0.4 MG/DL (ref 0.1–1)
BUN SERPL-MCNC: 13 MG/DL (ref 8–23)
CALCIUM SERPL-MCNC: 10.3 MG/DL (ref 8.7–10.5)
CHLORIDE SERPL-SCNC: 110 MMOL/L (ref 95–110)
CHOLEST SERPL-MCNC: 120 MG/DL (ref 120–199)
CHOLEST/HDLC SERPL: 2.7 {RATIO} (ref 2–5)
CO2 SERPL-SCNC: 23 MMOL/L (ref 23–29)
CREAT SERPL-MCNC: 1 MG/DL (ref 0.5–1.4)
DIFFERENTIAL METHOD BLD: ABNORMAL
EOSINOPHIL # BLD AUTO: 0.2 K/UL (ref 0–0.5)
EOSINOPHIL NFR BLD: 3.7 % (ref 0–8)
ERYTHROCYTE [DISTWIDTH] IN BLOOD BY AUTOMATED COUNT: 15.2 % (ref 11.5–14.5)
EST. GFR  (NO RACE VARIABLE): >60 ML/MIN/1.73 M^2
ESTIMATED AVG GLUCOSE: 157 MG/DL (ref 68–131)
GLUCOSE SERPL-MCNC: 112 MG/DL (ref 70–110)
HBA1C MFR BLD: 7.1 % (ref 4–5.6)
HCT VFR BLD AUTO: 40.3 % (ref 40–54)
HDLC SERPL-MCNC: 45 MG/DL (ref 40–75)
HDLC SERPL: 37.5 % (ref 20–50)
HGB BLD-MCNC: 12.4 G/DL (ref 14–18)
IMM GRANULOCYTES # BLD AUTO: 0.02 K/UL (ref 0–0.04)
IMM GRANULOCYTES NFR BLD AUTO: 0.3 % (ref 0–0.5)
LDLC SERPL CALC-MCNC: 61.8 MG/DL (ref 63–159)
LYMPHOCYTES # BLD AUTO: 1.2 K/UL (ref 1–4.8)
LYMPHOCYTES NFR BLD: 20.9 % (ref 18–48)
MCH RBC QN AUTO: 26.4 PG (ref 27–31)
MCHC RBC AUTO-ENTMCNC: 30.8 G/DL (ref 32–36)
MCV RBC AUTO: 86 FL (ref 82–98)
MONOCYTES # BLD AUTO: 0.8 K/UL (ref 0.3–1)
MONOCYTES NFR BLD: 13.3 % (ref 4–15)
NEUTROPHILS # BLD AUTO: 3.7 K/UL (ref 1.8–7.7)
NEUTROPHILS NFR BLD: 61.5 % (ref 38–73)
NONHDLC SERPL-MCNC: 75 MG/DL
NRBC BLD-RTO: 0 /100 WBC
PLATELET # BLD AUTO: 261 K/UL (ref 150–450)
PMV BLD AUTO: 13 FL (ref 9.2–12.9)
POTASSIUM SERPL-SCNC: 4.2 MMOL/L (ref 3.5–5.1)
PROT SERPL-MCNC: 7.3 G/DL (ref 6–8.4)
RBC # BLD AUTO: 4.69 M/UL (ref 4.6–6.2)
SODIUM SERPL-SCNC: 139 MMOL/L (ref 136–145)
TRIGL SERPL-MCNC: 66 MG/DL (ref 30–150)
TSH SERPL DL<=0.005 MIU/L-ACNC: 2.69 UIU/ML (ref 0.4–4)
URATE SERPL-MCNC: 4.5 MG/DL (ref 3.4–7)
WBC # BLD AUTO: 5.94 K/UL (ref 3.9–12.7)

## 2024-08-07 PROCEDURE — 3075F SYST BP GE 130 - 139MM HG: CPT | Mod: CPTII,S$GLB,, | Performed by: FAMILY MEDICINE

## 2024-08-07 PROCEDURE — 3008F BODY MASS INDEX DOCD: CPT | Mod: CPTII,S$GLB,, | Performed by: FAMILY MEDICINE

## 2024-08-07 PROCEDURE — 3079F DIAST BP 80-89 MM HG: CPT | Mod: CPTII,S$GLB,, | Performed by: FAMILY MEDICINE

## 2024-08-07 PROCEDURE — 84443 ASSAY THYROID STIM HORMONE: CPT | Performed by: FAMILY MEDICINE

## 2024-08-07 PROCEDURE — 83036 HEMOGLOBIN GLYCOSYLATED A1C: CPT | Performed by: FAMILY MEDICINE

## 2024-08-07 PROCEDURE — 4010F ACE/ARB THERAPY RXD/TAKEN: CPT | Mod: CPTII,S$GLB,, | Performed by: FAMILY MEDICINE

## 2024-08-07 PROCEDURE — 99214 OFFICE O/P EST MOD 30 MIN: CPT | Mod: S$GLB,,, | Performed by: FAMILY MEDICINE

## 2024-08-07 PROCEDURE — 99999 PR PBB SHADOW E&M-EST. PATIENT-LVL IV: CPT | Mod: PBBFAC,,, | Performed by: FAMILY MEDICINE

## 2024-08-07 PROCEDURE — 36415 COLL VENOUS BLD VENIPUNCTURE: CPT | Performed by: FAMILY MEDICINE

## 2024-08-07 PROCEDURE — 1159F MED LIST DOCD IN RCRD: CPT | Mod: CPTII,S$GLB,, | Performed by: FAMILY MEDICINE

## 2024-08-07 PROCEDURE — G2211 COMPLEX E/M VISIT ADD ON: HCPCS | Mod: S$GLB,,, | Performed by: FAMILY MEDICINE

## 2024-08-07 PROCEDURE — 80061 LIPID PANEL: CPT | Performed by: FAMILY MEDICINE

## 2024-08-07 PROCEDURE — 84550 ASSAY OF BLOOD/URIC ACID: CPT | Performed by: FAMILY MEDICINE

## 2024-08-07 PROCEDURE — 3044F HG A1C LEVEL LT 7.0%: CPT | Mod: CPTII,S$GLB,, | Performed by: FAMILY MEDICINE

## 2024-08-07 PROCEDURE — 3072F LOW RISK FOR RETINOPATHY: CPT | Mod: CPTII,S$GLB,, | Performed by: FAMILY MEDICINE

## 2024-08-07 PROCEDURE — 85025 COMPLETE CBC W/AUTO DIFF WBC: CPT | Performed by: FAMILY MEDICINE

## 2024-08-07 PROCEDURE — 80053 COMPREHEN METABOLIC PANEL: CPT | Performed by: FAMILY MEDICINE

## 2024-08-07 RX ORDER — ALLOPURINOL 100 MG/1
100 TABLET ORAL DAILY
COMMUNITY

## 2024-08-07 RX ORDER — SILDENAFIL 100 MG/1
100 TABLET, FILM COATED ORAL DAILY PRN
Qty: 30 TABLET | Refills: 1 | Status: SHIPPED | OUTPATIENT
Start: 2024-08-07 | End: 2025-08-07

## 2024-08-07 RX ORDER — LISINOPRIL 40 MG/1
40 TABLET ORAL DAILY
Qty: 90 TABLET | Refills: 3 | Status: SHIPPED | OUTPATIENT
Start: 2024-08-07 | End: 2025-08-07

## 2024-08-09 ENCOUNTER — HOSPITAL ENCOUNTER (OUTPATIENT)
Dept: PREADMISSION TESTING | Facility: HOSPITAL | Age: 69
Discharge: HOME | End: 2024-08-09
Attending: FAMILY MEDICINE
Payer: MEDICARE

## 2024-08-09 DIAGNOSIS — Z12.11 SCREENING FOR COLON CANCER: Primary | ICD-10-CM

## 2024-08-09 RX ORDER — POLYETHYLENE GLYCOL 3350, SODIUM SULFATE, POTASSIUM CHLORIDE, MAGNESIUM SULFATE, AND SODIUM CHLORIDE FOR ORAL SOLUTION 178.7-7.3G
2 KIT ORAL SEE ADMIN INSTRUCTIONS
Qty: 1 EACH | Refills: 0 | Status: SHIPPED | OUTPATIENT
Start: 2024-08-09

## 2024-08-16 DIAGNOSIS — R79.89 ELEVATED LIVER FUNCTION TESTS: Primary | ICD-10-CM

## 2024-09-19 ENCOUNTER — OFFICE VISIT (OUTPATIENT)
Dept: OPHTHALMOLOGY | Facility: CLINIC | Age: 69
End: 2024-09-19
Payer: MEDICARE

## 2024-09-19 DIAGNOSIS — E78.5 HYPERLIPIDEMIA ASSOCIATED WITH TYPE 2 DIABETES MELLITUS: ICD-10-CM

## 2024-09-19 DIAGNOSIS — H25.13 NUCLEAR SCLEROSIS OF BOTH EYES: ICD-10-CM

## 2024-09-19 DIAGNOSIS — E11.69 HYPERLIPIDEMIA ASSOCIATED WITH TYPE 2 DIABETES MELLITUS: ICD-10-CM

## 2024-09-19 DIAGNOSIS — H40.023 OPEN ANGLE WITH BORDERLINE FINDINGS AND HIGH GLAUCOMA RISK IN BOTH EYES: ICD-10-CM

## 2024-09-19 DIAGNOSIS — E11.9 DIABETES MELLITUS TYPE 2 WITHOUT RETINOPATHY: Primary | ICD-10-CM

## 2024-09-19 PROCEDURE — 99999 PR PBB SHADOW E&M-EST. PATIENT-LVL II: CPT | Mod: PBBFAC,,, | Performed by: OPHTHALMOLOGY

## 2024-09-19 PROCEDURE — 1160F RVW MEDS BY RX/DR IN RCRD: CPT | Mod: CPTII,S$GLB,, | Performed by: OPHTHALMOLOGY

## 2024-09-19 PROCEDURE — 4010F ACE/ARB THERAPY RXD/TAKEN: CPT | Mod: CPTII,S$GLB,, | Performed by: OPHTHALMOLOGY

## 2024-09-19 PROCEDURE — 3051F HG A1C>EQUAL 7.0%<8.0%: CPT | Mod: CPTII,S$GLB,, | Performed by: OPHTHALMOLOGY

## 2024-09-19 PROCEDURE — 2023F DILAT RTA XM W/O RTNOPTHY: CPT | Mod: CPTII,S$GLB,, | Performed by: OPHTHALMOLOGY

## 2024-09-19 PROCEDURE — 99204 OFFICE O/P NEW MOD 45 MIN: CPT | Mod: S$GLB,,, | Performed by: OPHTHALMOLOGY

## 2024-09-19 PROCEDURE — 76514 ECHO EXAM OF EYE THICKNESS: CPT | Mod: S$GLB,,, | Performed by: OPHTHALMOLOGY

## 2024-09-19 PROCEDURE — 1159F MED LIST DOCD IN RCRD: CPT | Mod: CPTII,S$GLB,, | Performed by: OPHTHALMOLOGY

## 2024-09-19 PROCEDURE — 92133 CPTRZD OPH DX IMG PST SGM ON: CPT | Mod: S$GLB,,, | Performed by: OPHTHALMOLOGY

## 2024-09-19 NOTE — PROGRESS NOTES
HPI    States that his vision is stable and denies any new issues at this time.   Last edited by Jeanna Dave on 9/19/2024  1:47 PM.            Assessment /Plan     For exam results, see Encounter Report.    Diabetes mellitus type 2 without retinopathy  Last A1c 7.1 . Diabetes controlled with no diabetic retinopathy on dilated exam. Reviewed diabetic eye precautions including excellent blood sugar control, and importance of regular follow up.     Open angle with borderline findings and high glaucoma risk in both eyes  Unknown family history. IOP low/normal with normal CCTs. HVF next visit.    Nuclear sclerosis of both eyes  Cataracts are present but not visually significant. Will continue to monitor. Mrx provided.      RTC 3 months HVF 24-2, IOP check

## 2024-10-03 ENCOUNTER — TELEPHONE (OUTPATIENT)
Dept: HEPATOLOGY | Facility: CLINIC | Age: 69
End: 2024-10-03
Payer: MEDICARE

## 2024-10-03 NOTE — TELEPHONE ENCOUNTER
----- Message from Lisette sent at 10/3/2024 10:14 AM CDT -----  Contact: Demario Hanks would like a call back at [phone] in regards to needing to reschedule his appointment he has for today 10/3 at 3:30pm with Kevin Coleman NP. Patient is rs due to having flu like symptoms.  Thanks   Am

## 2024-11-21 ENCOUNTER — OFFICE VISIT (OUTPATIENT)
Dept: INTERNAL MEDICINE | Facility: CLINIC | Age: 69
End: 2024-11-21
Payer: MEDICARE

## 2024-11-21 VITALS
HEIGHT: 74 IN | DIASTOLIC BLOOD PRESSURE: 88 MMHG | HEART RATE: 96 BPM | SYSTOLIC BLOOD PRESSURE: 164 MMHG | WEIGHT: 222 LBS | BODY MASS INDEX: 28.49 KG/M2 | OXYGEN SATURATION: 98 %

## 2024-11-21 DIAGNOSIS — C7A.098 MALIGNANT CARCINOID TUMORS OF OTHER SITES: ICD-10-CM

## 2024-11-21 DIAGNOSIS — E11.69 HYPERLIPIDEMIA ASSOCIATED WITH TYPE 2 DIABETES MELLITUS: ICD-10-CM

## 2024-11-21 DIAGNOSIS — I10 HYPERTENSION, UNSPECIFIED TYPE: ICD-10-CM

## 2024-11-21 DIAGNOSIS — C61 PROSTATE CANCER: ICD-10-CM

## 2024-11-21 DIAGNOSIS — E78.5 HYPERLIPIDEMIA ASSOCIATED WITH TYPE 2 DIABETES MELLITUS: ICD-10-CM

## 2024-11-21 DIAGNOSIS — Z00.00 ENCOUNTER FOR PREVENTIVE HEALTH EXAMINATION: Primary | ICD-10-CM

## 2024-11-21 PROCEDURE — 99999 PR PBB SHADOW E&M-EST. PATIENT-LVL IV: CPT | Mod: PBBFAC,,, | Performed by: NURSE PRACTITIONER

## 2024-11-21 NOTE — PROGRESS NOTES
"  Demario Live presented for a  Medicare AWV and comprehensive Health Risk Assessment today. The following components were reviewed and updated:    Medical history  Family History  Social history  Allergies and Current Medications  Health Risk Assessment  Health Maintenance  Care Team         ** See Completed Assessments for Annual Wellness Visit within the encounter summary.**         The following assessments were completed:  Living Situation  CAGE  Depression Screening  Timed Get Up and Go  Whisper Test  Cognitive Function Screening  Nutrition Screening  ADL Screening  PAQ Screening      Opioid documentation:      Patient does not have a current opioid prescription.        Vitals:    11/21/24 0942   BP: (!) 164/88   Pulse: 96   SpO2: 98%   Weight: 100.7 kg (222 lb 0.1 oz)   Height: 6' 2" (1.88 m)     Body mass index is 28.5 kg/m².  Physical Exam  Vitals and nursing note reviewed.   Constitutional:       Appearance: He is well-developed.   HENT:      Head: Normocephalic.   Cardiovascular:      Rate and Rhythm: Normal rate and regular rhythm.      Heart sounds: Normal heart sounds.   Pulmonary:      Effort: Pulmonary effort is normal. No respiratory distress.      Breath sounds: Normal breath sounds.   Abdominal:      Palpations: Abdomen is soft. There is no mass.      Tenderness: There is no abdominal tenderness.   Musculoskeletal:         General: Normal range of motion.   Skin:     General: Skin is warm and dry.   Neurological:      Mental Status: He is alert and oriented to person, place, and time.      Motor: No abnormal muscle tone.   Psychiatric:         Speech: Speech normal.         Behavior: Behavior normal.               Diagnoses and health risks identified today and associated recommendations/orders:    1. Encounter for preventive health examination  Declines covid and flu vaccines  He will discuss shingrix, tetanus, and rsv vaccine with oncologist.   Encouraged healthy diet and exercise as tolerated "   Has urine leakage ever interrupted your daily activites or sleep? No  Do you think you could use some help to better manage urine leakage?No     2. Malignant carcinoid tumors of other sites  Somatuline injections every 4 weeks per pt  Reports had PET scan this month and fu with oncology next month  Continue current treatment plan as previously prescribed with your  oncologist.     3. Prostate cancer  REports PSA monitored every 3 months with urologist  Reports has apt next week  Continue current treatment plan as previously prescribed with your  urologist.     4. Hyperlipidemia associated with type 2 diabetes mellitus  A1c 7.1  Lipid-s/c  Continue current treatment plan as previously prescribed with your  pcp     5. Hypertension, unspecified type  BP elevated at today's visit. Asymptomatic. Reports has not taken BP medication this morning. He will take as soon as he gets home, recheck, and notify PCP if not trending down.  Discussed s/s of MI and stroke (patient denies any s/s) and advised to go to the ER/911 if occur. Advised patient to monitor BP (keep a log) and if continues to stay elevated (greater than 130/80) to follow up with PCP for further evaluation and treatment. He expressed understanding.  Declines nurse visit at this time.     Reports he is awaiting a call back to rs colonoscopy. Message sent to staff to please contact for rs.     Provided Demario with a 5-10 year written screening schedule and personal prevention plan. Recommendations were developed using the USPSTF age appropriate recommendations. Education, counseling, and referrals were provided as needed. After Visit Summary printed and given to patient which includes a list of additional screenings\tests needed.    Follow up in about 1 year (around 11/21/2025) for awv.    Jumana Harris NP  I offered to discuss advanced care planning, including how to pick a person who would make decisions for you if you were unable to make them for  yourself, called a health care power of , and what kind of decisions you might make such as use of life sustaining treatments such as ventilators and tube feeding when faced with a life limiting illness recorded on a living will that they will need to know. (How you want to be cared for as you near the end of your natural life)     X Patient is interested in learning more about how to make advanced directives.  I provided them paperwork and offered to discuss this with them.

## 2024-11-21 NOTE — PATIENT INSTRUCTIONS
Counseling and Referral of Other Preventative  (Italic type indicates deductible and co-insurance are waived)    Patient Name: Demario Live  Today's Date: 11/21/2024    Health Maintenance       Date Due Completion Date    TETANUS VACCINE Never done ---    Shingles Vaccine (1 of 2) Never done ---    Colorectal Cancer Screening Never done ---    RSV Vaccine (Age 60+ and Pregnant patients) (1 - Risk 60-74 years 1-dose series) Never done ---    Foot Exam 03/22/2024 3/22/2023    Diabetes Urine Screening 06/30/2024 6/30/2023    Influenza Vaccine (1) 06/30/2025 (Originally 9/1/2024) 2/1/2024    COVID-19 Vaccine (3 - Moderna risk series) 11/21/2025 (Originally 3/1/2022) 2/1/2022    Hemoglobin A1c 02/07/2025 8/7/2024    Lipid Panel 08/07/2025 8/7/2024    Eye Exam 09/19/2025 9/19/2024    Low Dose Statin 11/21/2025 11/21/2024        No orders of the defined types were placed in this encounter.      The following information is provided to all patients.  This information is to help you find resources for any of the problems found today that may be affecting your health:                  Living healthy guide: www.[facility].[place].gov      Understanding Diabetes: www.diabetes.org      Eating healthy: www.cdc.gov/healthyweight      CDC home safety checklist: www.cdc.gov/steadi/patient.html      Agency on Aging: www.goea.[place].gov      Alcoholics anonymous (AA): www.aa.org      Physical Activity: www.miguel.nih.gov/tj7uqiu      Tobacco use: www.quitwithusla.org

## 2024-11-22 ENCOUNTER — PATIENT OUTREACH (OUTPATIENT)
Dept: ADMINISTRATIVE | Facility: HOSPITAL | Age: 69
End: 2024-11-22
Payer: MEDICARE

## 2024-11-22 VITALS — SYSTOLIC BLOOD PRESSURE: 134 MMHG | DIASTOLIC BLOOD PRESSURE: 82 MMHG

## 2024-11-22 DIAGNOSIS — I15.2 HYPERTENSION ASSOCIATED WITH DIABETES: ICD-10-CM

## 2024-11-22 DIAGNOSIS — E11.9 TYPE 2 DIABETES MELLITUS WITHOUT COMPLICATION, WITHOUT LONG-TERM CURRENT USE OF INSULIN: Primary | ICD-10-CM

## 2024-11-22 DIAGNOSIS — E11.59 HYPERTENSION ASSOCIATED WITH DIABETES: ICD-10-CM

## 2024-11-22 NOTE — PROGRESS NOTES
VBHM Score: 4     Colon Cancer Screening  Urine Screening  Foot Exam  Uncontrolled BP    Tetanus Vaccine  Shingles/Zoster Vaccine  RSV Vaccine            Requested an updated bp reading. /82, remote entry.  Assisted to schedule annual exam, 3/12/25. Will come fasting for same day labs.  Orders placed and scheduled, 3/12/25.  Pt has PAT scheduled for 11/25/24.

## 2024-11-25 ENCOUNTER — OFFICE VISIT (OUTPATIENT)
Dept: HEPATOLOGY | Facility: CLINIC | Age: 69
End: 2024-11-25
Payer: MEDICARE

## 2024-11-25 ENCOUNTER — HOSPITAL ENCOUNTER (OUTPATIENT)
Dept: PREADMISSION TESTING | Facility: HOSPITAL | Age: 69
Discharge: HOME | End: 2024-11-25
Attending: INTERNAL MEDICINE
Payer: MEDICARE

## 2024-11-25 VITALS
HEIGHT: 74 IN | WEIGHT: 219.38 LBS | DIASTOLIC BLOOD PRESSURE: 88 MMHG | HEART RATE: 106 BPM | BODY MASS INDEX: 28.15 KG/M2 | SYSTOLIC BLOOD PRESSURE: 151 MMHG

## 2024-11-25 DIAGNOSIS — R74.8 ELEVATED LIVER ENZYMES: Primary | ICD-10-CM

## 2024-11-25 DIAGNOSIS — C61 PROSTATE CANCER: ICD-10-CM

## 2024-11-25 DIAGNOSIS — R79.1 ABNORMAL COAGULATION PROFILE: ICD-10-CM

## 2024-11-25 DIAGNOSIS — C25.9 MALIGNANT NEOPLASM OF PANCREAS, UNSPECIFIED LOCATION OF MALIGNANCY: ICD-10-CM

## 2024-11-25 PROCEDURE — 3008F BODY MASS INDEX DOCD: CPT | Mod: CPTII,S$GLB,, | Performed by: NURSE PRACTITIONER

## 2024-11-25 PROCEDURE — 3288F FALL RISK ASSESSMENT DOCD: CPT | Mod: CPTII,S$GLB,, | Performed by: NURSE PRACTITIONER

## 2024-11-25 PROCEDURE — 99999 PR PBB SHADOW E&M-EST. PATIENT-LVL V: CPT | Mod: PBBFAC,,, | Performed by: NURSE PRACTITIONER

## 2024-11-25 PROCEDURE — 1101F PT FALLS ASSESS-DOCD LE1/YR: CPT | Mod: CPTII,S$GLB,, | Performed by: NURSE PRACTITIONER

## 2024-11-25 PROCEDURE — 99203 OFFICE O/P NEW LOW 30 MIN: CPT | Mod: S$GLB,,, | Performed by: NURSE PRACTITIONER

## 2024-11-25 PROCEDURE — 4010F ACE/ARB THERAPY RXD/TAKEN: CPT | Mod: CPTII,S$GLB,, | Performed by: NURSE PRACTITIONER

## 2024-11-25 PROCEDURE — 3077F SYST BP >= 140 MM HG: CPT | Mod: CPTII,S$GLB,, | Performed by: NURSE PRACTITIONER

## 2024-11-25 PROCEDURE — 3079F DIAST BP 80-89 MM HG: CPT | Mod: CPTII,S$GLB,, | Performed by: NURSE PRACTITIONER

## 2024-11-25 PROCEDURE — 1159F MED LIST DOCD IN RCRD: CPT | Mod: CPTII,S$GLB,, | Performed by: NURSE PRACTITIONER

## 2024-11-25 PROCEDURE — 1126F AMNT PAIN NOTED NONE PRSNT: CPT | Mod: CPTII,S$GLB,, | Performed by: NURSE PRACTITIONER

## 2024-11-25 PROCEDURE — 1160F RVW MEDS BY RX/DR IN RCRD: CPT | Mod: CPTII,S$GLB,, | Performed by: NURSE PRACTITIONER

## 2024-11-25 PROCEDURE — 3051F HG A1C>EQUAL 7.0%<8.0%: CPT | Mod: CPTII,S$GLB,, | Performed by: NURSE PRACTITIONER

## 2024-11-25 NOTE — PROGRESS NOTES
Hepatology Note    PATIENT: Demario Live  MRN: [id number]  DATE: 11/25/2024    Urgency of review: non-urgent  Referring provider: Dr. Keegan Abraham    Diagnosis:   1. Elevated liver function tests    2. Abnormal coagulation profile    3. Malignant neoplasm of pancreas, unspecified location of malignancy    4. Prostate cancer        Chief complaint:   Chief Complaint   Patient presents with    elevated LFT       Subjective:    Initial History: Demario Live is a 68 y.o. male who was referred to Hepatology Clinic for consultation of elevated liver enzymes  The patient reports medical history of malignant neoplasm of pancreas, prostate cancer,       8/7/2024  AST 76, , A1C 7.1,     11/25/2024   The patient reported that he was found to have malignant cancer in 4/2021, is treated by Lanreotide monthly by Dr. Orion Bryan, also recevied Y 9-0 treatment in 11/2023.     He also currently has prostate cancer but does not have any treatment.     His liver enzymes elevated this year so he was sent to hepatology department.     He denied recent hematemesis, coffee ground emesis, melena, hematochezia, jaundice, confusion, LE edema, abdominal distension.    He reported that He used to drink 6 alcoholic drinks per day 3 days per week.  He stopped alcohol consumption around 2016.      He reported no history of autoimmune disease, hypothyroidism, IBD, IV drug, blood transfusion, HIV, viral hepatitis, hemochromatosis, nor tattoo.     He reported no family history of autoimmune disease, hypothyroidism, IBD, nor hemochromatosis.    The patient reported that father had no history of cancer, mother had no history of cancer.    He never had colonoscopy and he reported that he is scheduled for colonoscopy next month.      FIB-4 Calculation: 1.78 at 8/7/2024  8:54 AM  Calculated from:  SGOT/AST: 76 U/L at 8/7/2024  8:54 AM  SGPT/ALT: 128 U/L at 8/7/2024  8:54 AM  Platelets: 261 K/uL at 8/7/2024  8:54 AM  Age: 68 years        FIB-4 below 1.30 is considered as low-risk for advanced fibrosis  FIB-4 over 2.67 is considered as high-risk for advanced fibrosis  FIB-4 values between 1.30 and 2.67 are considered as intermediate-risk of advanced fibrosis for ages 36-64.     For ages > 64 the cut-off for low-risk goes to < 2.  This is a screening tool and clinical judgement should be used in the interpretation of these results.    Computed MELD 3.0 unavailable. One or more values for this score either were not found within the given timeframe or did not fit some other criterion.  Computed MELD-Na unavailable. One or more values for this score either were not found within the given timeframe or did not fit some other criterion.      Prior Relevant History:    He is on Lipitor and Lanreotide.    Lanreotide has many side effects including suppression of gall bladder contractility and bile production, and maintenance therapy may cause cholelithiasis and pancreatitis as well accompanying liver injury.     Review of systems:  A review of 12+ systems was conducted with pertinent positive and negative findings documented in HPI with all other systems reviewed and negative.      PFSH:  Past medical, family, and social history reviewed as documented in chart with pertinent positive medical, family, and social history detailed in HPI.    Past Medical History:   Past Medical History:   Diagnosis Date    Acute deep vein thrombosis (DVT) of both lower extremities 4/17/2023    Acute pulmonary embolism 4/17/2023    Chronic kidney disease, stage 3a 4/17/2023    HLD (hyperlipidemia) 2/15/2019    Hypertension     Neuroendocrine carcinoma of pancreas 4/21/2021    Type 2 diabetes mellitus without complication, without long-term current use of insulin 4/17/2023       Past Surgical HIstory:   Past Surgical History:   Procedure Laterality Date    LIPOMA RESECTION      PROSTATE SURGERY  02/14/2023       Family History:   Family History   Problem Relation Name Age of  Onset    Stroke Mother      Heart disease Father       He has no known family history of liver disease.     Social History:  reports that he has never smoked. He has been exposed to tobacco smoke. He has never used smokeless tobacco. He reports that he does not currently use alcohol. He reports that he does not use drugs.    He had significant history of alcohol consumption.    He denies history of IV drug use/Tattoo.  He  denies high-risk sexual contacts, no raw seafood, no sick contacts      Allergies:  Review of patient's allergies indicates:  No Known Allergies    Medications:  Current Outpatient Medications   Medication Sig Dispense Refill    allopurinoL (ZYLOPRIM) 100 MG tablet Take 100 mg by mouth once daily.      atorvastatin (LIPITOR) 10 MG tablet Take 1 tablet (10 mg total) by mouth once daily. 90 tablet 3    blood sugar diagnostic Strp To check BG 1-2 times daily as needed, to use with insurance preferred meter 120 strip 11    blood-glucose meter kit To check BG 2-3 times daily, to use with insurance preferred meter 1 each 0    colchicine (COLCRYS) 0.6 mg tablet TAKE 2 TABLETS BY MOUTH AT FIRST SIGN OF FLARE AND THEN 1 TABLET 1 HOUR LATER THEN 1 TABLET A DAY UNTIL RESOLVED 90 tablet 1    lancets Misc To check BG 1- 2 times daily as needed, to use with insurance preferred meter 120 each 11    lisinopriL (PRINIVIL,ZESTRIL) 40 MG tablet Take 1 tablet (40 mg total) by mouth once daily. 90 tablet 3    ONETOUCH DELICA PLUS LANCET 33 gauge Misc SMARTSIG:Topical 1-2 Times Daily PRN      ONETOUCH VERIO FLEX METER Misc TO CHECK BLOOD SUGAR 2-3 TIMES DAILY      sildenafiL (VIAGRA) 100 MG tablet Take 1 tablet (100 mg total) by mouth daily as needed for Erectile Dysfunction. 30 tablet 1    peg 3350-sod sulf,chlr-pot-mag (SUFLAVE) 178.7-7.3-0.5 gram SolR Take 2 Bottles by mouth As instructed (Use as directed in facility directions). 1 each 0     No current facility-administered medications for this visit.       Review  "of Systems   Constitutional:  Negative for chills, fatigue, fever and unexpected weight change.   HENT:  Negative for facial swelling and nosebleeds.    Eyes:  Negative for pain and redness.   Respiratory:  Negative for cough, chest tightness and shortness of breath.         Hx of PE.   Cardiovascular:  Negative for chest pain and leg swelling.        HTN.   Gastrointestinal:  Negative for abdominal distention, abdominal pain, blood in stool, constipation, diarrhea, nausea and vomiting.        Hx of malignant neoplasm of pancreas.   Endocrine:        DM.   Genitourinary:  Negative for hematuria.        Prostate cancer.        Musculoskeletal:  Negative for gait problem and joint swelling.   Skin:  Negative for color change and rash.   Allergic/Immunologic: Negative for food allergies.   Neurological:  Negative for tremors, seizures, syncope and speech difficulty.   Hematological:  Does not bruise/bleed easily.        Hx of DVT.   Psychiatric/Behavioral:  Negative for behavioral problems and confusion.        Computed MELD 3.0 unavailable. One or more values for this score either were not found within the given timeframe or did not fit some other criterion.  Computed MELD-Na unavailable. One or more values for this score either were not found within the given timeframe or did not fit some other criterion.       Objective:      Vitals:   Vitals:    11/25/24 1246   BP: (!) 151/88   BP Location: Left arm   Patient Position: Sitting   Pulse: 106   Weight: 99.5 kg (219 lb 5.7 oz)   Height: 6' 2" (1.88 m)       Physical Exam  HENT:      Nose: No congestion.      Mouth/Throat:      Pharynx: No oropharyngeal exudate.   Eyes:      General: No scleral icterus.  Cardiovascular:      Rate and Rhythm: Normal rate.   Pulmonary:      Effort: No respiratory distress.      Breath sounds: Normal breath sounds. No wheezing.   Abdominal:      General: Bowel sounds are normal. There is no distension.      Palpations: Abdomen is soft. " There is no mass.      Tenderness: There is no guarding.      Hernia: No hernia is present.   Musculoskeletal:         General: No swelling.      Cervical back: Normal range of motion.      Right lower leg: No edema.      Left lower leg: No edema.   Skin:     Coloration: Skin is not jaundiced.      Findings: No bruising.   Neurological:      General: No focal deficit present.      Mental Status: He is alert and oriented to person, place, and time. Mental status is at baseline.      Comments: No asterixis.   Psychiatric:         Mood and Affect: Mood normal.         Behavior: Behavior normal.         Laboratory Data:  No visits with results within 4 Week(s) from this visit.   Latest known visit with results is:   Lab Visit on 08/07/2024   Component Date Value Ref Range Status    WBC 08/07/2024 5.94  3.90 - 12.70 K/uL Final    RBC 08/07/2024 4.69  4.60 - 6.20 M/uL Final    Hemoglobin 08/07/2024 12.4 (L)  14.0 - 18.0 g/dL Final    Hematocrit 08/07/2024 40.3  40.0 - 54.0 % Final    MCV 08/07/2024 86  82 - 98 fL Final    MCH 08/07/2024 26.4 (L)  27.0 - 31.0 pg Final    MCHC 08/07/2024 30.8 (L)  32.0 - 36.0 g/dL Final    RDW 08/07/2024 15.2 (H)  11.5 - 14.5 % Final    Platelets 08/07/2024 261  150 - 450 K/uL Final    MPV 08/07/2024 13.0 (H)  9.2 - 12.9 fL Final    Immature Granulocytes 08/07/2024 0.3  0.0 - 0.5 % Final    Gran # (ANC) 08/07/2024 3.7  1.8 - 7.7 K/uL Final    Immature Grans (Abs) 08/07/2024 0.02  0.00 - 0.04 K/uL Final    Comment: Mild elevation in immature granulocytes is non specific and   can be seen in a variety of conditions including stress response,   acute inflammation, trauma and pregnancy. Correlation with other   laboratory and clinical findings is essential.      Lymph # 08/07/2024 1.2  1.0 - 4.8 K/uL Final    Mono # 08/07/2024 0.8  0.3 - 1.0 K/uL Final    Eos # 08/07/2024 0.2  0.0 - 0.5 K/uL Final    Baso # 08/07/2024 0.02  0.00 - 0.20 K/uL Final    nRBC 08/07/2024 0  0 /100 WBC Final    Gran %  08/07/2024 61.5  38.0 - 73.0 % Final    Lymph % 08/07/2024 20.9  18.0 - 48.0 % Final    Mono % 08/07/2024 13.3  4.0 - 15.0 % Final    Eosinophil % 08/07/2024 3.7  0.0 - 8.0 % Final    Basophil % 08/07/2024 0.3  0.0 - 1.9 % Final    Differential Method 08/07/2024 Automated   Final    Sodium 08/07/2024 139  136 - 145 mmol/L Final    Potassium 08/07/2024 4.2  3.5 - 5.1 mmol/L Final    Chloride 08/07/2024 110  95 - 110 mmol/L Final    CO2 08/07/2024 23  23 - 29 mmol/L Final    Glucose 08/07/2024 112 (H)  70 - 110 mg/dL Final    BUN 08/07/2024 13  8 - 23 mg/dL Final    Creatinine 08/07/2024 1.0  0.5 - 1.4 mg/dL Final    Calcium 08/07/2024 10.3  8.7 - 10.5 mg/dL Final    Total Protein 08/07/2024 7.3  6.0 - 8.4 g/dL Final    Albumin 08/07/2024 3.1 (L)  3.5 - 5.2 g/dL Final    Total Bilirubin 08/07/2024 0.4  0.1 - 1.0 mg/dL Final    Comment: For infants and newborns, interpretation of results should be based  on gestational age, weight and in agreement with clinical  observations.    Premature Infant recommended reference ranges:  Up to 24 hours.............<8.0 mg/dL  Up to 48 hours............<12.0 mg/dL  3-5 days..................<15.0 mg/dL  6-29 days.................<15.0 mg/dL      Alkaline Phosphatase 08/07/2024 228 (H)  55 - 135 U/L Final    AST 08/07/2024 76 (H)  10 - 40 U/L Final    ALT 08/07/2024 128 (H)  10 - 44 U/L Final    eGFR 08/07/2024 >60.0  >60 mL/min/1.73 m^2 Final    Anion Gap 08/07/2024 6 (L)  8 - 16 mmol/L Final    Hemoglobin A1C 08/07/2024 7.1 (H)  4.0 - 5.6 % Final    Comment: ADA Screening Guidelines:  5.7-6.4%  Consistent with prediabetes  >or=6.5%  Consistent with diabetes    High levels of fetal hemoglobin interfere with the HbA1C  assay. Heterozygous hemoglobin variants (HbS, HgC, etc)do  not significantly interfere with this assay.   However, presence of multiple variants may affect accuracy.      Estimated Avg Glucose 08/07/2024 157 (H)  68 - 131 mg/dL Final    TSH 08/07/2024 2.694  0.400 -  "4.000 uIU/mL Final    Cholesterol 08/07/2024 120  120 - 199 mg/dL Final    Comment: The National Cholesterol Education Program (NCEP) has set the  following guidelines (reference ranges) for Cholesterol:  Optimal.....................<200 mg/dL  Borderline High.............200-239 mg/dL  High........................> or = 240 mg/dL      Triglycerides 08/07/2024 66  30 - 150 mg/dL Final    Comment: The National Cholesterol Education Program (NCEP) has set the  following guidelines (reference values) for triglycerides:  Normal......................<150 mg/dL  Borderline High.............150-199 mg/dL  High........................200-499 mg/dL      HDL 08/07/2024 45  40 - 75 mg/dL Final    Comment: The National Cholesterol Education Program (NCEP) has set the  following guidelines (reference values) for HDL Cholesterol:  Low...............<40 mg/dL  Optimal...........>60 mg/dL      LDL Cholesterol 08/07/2024 61.8 (L)  63.0 - 159.0 mg/dL Final    Comment: The National Cholesterol Education Program (NCEP) has set the  following guidelines (reference values) for LDL Cholesterol:  Optimal.......................<130 mg/dL  Borderline High...............130-159 mg/dL  High..........................160-189 mg/dL  Very High.....................>190 mg/dL      HDL/Cholesterol Ratio 08/07/2024 37.5  20.0 - 50.0 % Final    Total Cholesterol/HDL Ratio 08/07/2024 2.7  2.0 - 5.0 Final    Non-HDL Cholesterol 08/07/2024 75  mg/dL Final    Comment: Risk category and Non-HDL cholesterol goals:  Coronary heart disease (CHD)or equivalent (10-year risk of CHD >20%):  Non-HDL cholesterol goal     <130 mg/dL  Two or more CHD risk factors and 10-year risk of CHD <= 20%:  Non-HDL cholesterol goal     <160 mg/dL  0 to 1 CHD risk factor:  Non-HDL cholesterol goal     <190 mg/dL      Uric Acid 08/07/2024 4.5  3.4 - 7.0 mg/dL Final       Lab Results   Component Value Date    INR 1.0 11/20/2023       No results found for: "SMOOTHMUSCAB", " ""MITOAB"    Lab Results   Component Value Date    IRON 39 (L) 02/01/2024    TIBC 450 02/01/2024    FERRITIN 32 02/01/2024    FESATURATED 9 (L) 02/01/2024       Lab Results   Component Value Date    HEPCAB Negative 10/12/2018       Lab Results   Component Value Date    TSH 2.694 08/07/2024       Lab Results   Component Value Date    TSH 2.694 08/07/2024     08/07/2024    ALKPHOS 228 (H) 08/07/2024    AST 76 (H) 08/07/2024     (H) 08/07/2024    BILITOT 0.4 08/07/2024    INR 1.0 11/20/2023     No results found for: "ABORH"    No results found for: "RETIC", "LDH", "HAPTOGLOBIN"    No results found for: "ANASCREEN", "SMOOTHMUSCAB", "AMAIFA", "HEPAIGG", "HEPBCAB", "HEPBSAB", "D3QDNAFTJG"  No results found for: "CERULOPLSM"     Lab Results   Component Value Date    HGBA1C 7.1 (H) 08/07/2024     Lab Results   Component Value Date    CHOL 120 08/07/2024    CHOL 125 06/30/2023    CHOL 146 05/31/2022     Lab Results   Component Value Date    HDL 45 08/07/2024    HDL 43 06/30/2023    HDL 37 (L) 05/31/2022     Lab Results   Component Value Date    LDLCALC 61.8 (L) 08/07/2024    LDLCALC 63.2 06/30/2023    LDLCALC 89.0 05/31/2022     Lab Results   Component Value Date    TRIG 66 08/07/2024    TRIG 94 06/30/2023    TRIG 100 05/31/2022     Lab Results   Component Value Date    CHOLHDL 37.5 08/07/2024    CHOLHDL 34.4 06/30/2023    CHOLHDL 25.3 05/31/2022         I personally reviewed imaging studies and outside records..      Assessment:       1. Elevated liver function tests    2. Abnormal coagulation profile    3. Malignant neoplasm of pancreas, unspecified location of malignancy    4. Prostate cancer           Plan:     Problem List Items Addressed This Visit       Prostate cancer    Malignant neoplasm of pancreas, unspecified location of malignancy     Other Visit Diagnoses       Elevated liver function tests    -  Primary    Relevant Orders    Alpha-1-Antitrypsin    RICO Screen w/Reflex    Anti-Liver, Kidney, " Microsome Ab    Antimitochondrial Antibody    Anti-Smooth Muscle Antibody    CBC Auto Differential    Ceruloplasmin    Comprehensive Metabolic Panel    Ferritin    FibroScan (Vibration Controlled Transient Elastography)    Hepatitis A antibody, IgG    Hepatitis B Core Antibody, Total    Hepatitis B Surface Ab, Qualitative    Hepatitis B Surface Antigen    Hepatitis C Antibody    IgG    Iron and TIBC    Protime-INR    Tissue Transglutaminase, IgA    US Abdomen Complete    Alpha-Fetoprotein and AFP L-3    DIXON-Gamma-Carboxy Prothrombin (DCP)    US Abdomen Complete    Abnormal coagulation profile        Relevant Orders    Protime-INR            Demario was seen today for elevated lft.    Diagnoses and all orders for this visit:    Elevated liver function tests  -     Ambulatory referral/consult to Hepatology  -     Alpha-1-Antitrypsin; Future  -     RICO Screen w/Reflex; Future  -     Anti-Liver, Kidney, Microsome Ab; Future  -     Antimitochondrial Antibody; Future  -     Anti-Smooth Muscle Antibody; Future  -     CBC Auto Differential; Standing  -     Ceruloplasmin; Future  -     Comprehensive Metabolic Panel; Standing  -     Ferritin; Future  -     FibroScan (Vibration Controlled Transient Elastography); Future  -     Hepatitis A antibody, IgG; Future  -     Hepatitis B Core Antibody, Total; Future  -     Hepatitis B Surface Ab, Qualitative; Future  -     Hepatitis B Surface Antigen; Future  -     Hepatitis C Antibody; Future  -     IgG; Future  -     Iron and TIBC; Future  -     Protime-INR; Future  -     Tissue Transglutaminase, IgA; Future  -     US Abdomen Complete; Future  -     Alpha-Fetoprotein and AFP L-3; Future  -     DIXON-Gamma-Carboxy Prothrombin (DCP); Future  -     US Abdomen Complete; Future    Abnormal coagulation profile  -     Protime-INR; Future    Malignant neoplasm of pancreas, unspecified location of malignancy    Prostate cancer        Recommendations  Etiological workup for viral ( Hepatitis  A/B/C), autoimmune, genetic liver disease ( Luis E, A1AT etc).  CBC, CMP INR today. Then CMP before next visit.   US abd.  Fibroscan.   Recommend PCP to monitor A1c Q 3 months  US abd. AFP L3, DCP for HCC screening.    I recommended a more pragmatic approach to the patient's medical problems which would include the following:  - life-style modifications: weight loss, daily exercise (30 mins of HIIT or cardio), low carb/low fat diet.  - Educated patient on spectrum of fatty liver disease and potential for cirrhosis if MASH present.  - Explored dietary habits and discussed dietary recommendations.  - Low calorie, low carbohydrate, high protein mediterranean diet with goal of loosing >3-5% to improve steatosis and >7-10% to improve histological changes  Recommend alcohol abstinence.    Further recommendations pending labs, US, fibroscan.    I have sent communication to the referring physician and/or primary care provider.    Time Statement  A total 35 minutes spent includes time preparing to see patient, reviewing  diagnostic studies and records, direct face-to-face visit, completing orders, medications, reconciliation, prescription management, and care coordination.    We discussed in depth the nature of the patient's disease, the management plan in details. I have provided the patient with an opportunity to ask questions and have all questions answered.    Discussed with the patient that it is likely that the patient will see results before myself or my nurse are able to view them and report results due to the Cures Act passed 4/1/21. Results will be sent immediately to the patient who are enrolled in the patient portal. If results come through after business hours or on weekend, we will not see them until the next business day that we are in the office. If resulted during the business day, we will likely not be able to review them until after completing all patient visits in office that day.     Kevin Coleman,  FNP [facility]

## 2024-12-02 ENCOUNTER — TELEPHONE (OUTPATIENT)
Dept: INTERNAL MEDICINE | Facility: CLINIC | Age: 69
End: 2024-12-02
Payer: MEDICARE

## 2024-12-02 NOTE — TELEPHONE ENCOUNTER
----- Message from Nyla sent at 12/2/2024 12:12 PM CST -----  Contact: LORENA JIMENEZ [[id number]]  .Type:  RX Refill Request    Who Called:  LORENA JIMENEZ [[id number]]  Refill or New Rx: refill  RX Name and Strength: allopurinoL (ZYLOPRIM) 100 MG tablet  How is the patient currently taking it? (ex. 1XDay): as prescribed  Is this a 30 day or 90 day RX: 90  Preferred Pharmacy with phone number: .  [facility]/pharmacy #[id number] - [place] - [address]  [address] [place] [address]  Phone: [phone] Fax: [phone]  Local or Mail Order: local  Ordering Provider: Historical  Would the patient rather a call back or a response via [facility]?  call  Best Call Back Number: .[phone] (home)   Additional Information:

## 2024-12-05 ENCOUNTER — LAB VISIT (OUTPATIENT)
Dept: LAB | Facility: HOSPITAL | Age: 69
End: 2024-12-05
Attending: FAMILY MEDICINE
Payer: MEDICARE

## 2024-12-05 ENCOUNTER — OFFICE VISIT (OUTPATIENT)
Dept: INTERNAL MEDICINE | Facility: CLINIC | Age: 69
End: 2024-12-05
Payer: MEDICARE

## 2024-12-05 VITALS
RESPIRATION RATE: 18 BRPM | TEMPERATURE: 96 F | BODY MASS INDEX: 27.99 KG/M2 | DIASTOLIC BLOOD PRESSURE: 84 MMHG | SYSTOLIC BLOOD PRESSURE: 136 MMHG | WEIGHT: 218 LBS | HEART RATE: 100 BPM | OXYGEN SATURATION: 98 %

## 2024-12-05 DIAGNOSIS — E11.69 HYPERLIPIDEMIA ASSOCIATED WITH TYPE 2 DIABETES MELLITUS: ICD-10-CM

## 2024-12-05 DIAGNOSIS — E78.5 HYPERLIPIDEMIA ASSOCIATED WITH TYPE 2 DIABETES MELLITUS: Primary | ICD-10-CM

## 2024-12-05 DIAGNOSIS — C7A.098 MALIGNANT CARCINOID TUMORS OF OTHER SITES: ICD-10-CM

## 2024-12-05 DIAGNOSIS — N52.9 ERECTILE DYSFUNCTION, UNSPECIFIED ERECTILE DYSFUNCTION TYPE: ICD-10-CM

## 2024-12-05 DIAGNOSIS — I10 HYPERTENSION, UNSPECIFIED TYPE: ICD-10-CM

## 2024-12-05 DIAGNOSIS — E11.69 HYPERLIPIDEMIA ASSOCIATED WITH TYPE 2 DIABETES MELLITUS: Primary | ICD-10-CM

## 2024-12-05 DIAGNOSIS — M10.9 GOUT, UNSPECIFIED CAUSE, UNSPECIFIED CHRONICITY, UNSPECIFIED SITE: ICD-10-CM

## 2024-12-05 DIAGNOSIS — C61 PROSTATE CANCER: ICD-10-CM

## 2024-12-05 DIAGNOSIS — Z87.39 PERSONAL HISTORY OF GOUT: ICD-10-CM

## 2024-12-05 DIAGNOSIS — I15.2 HYPERTENSION ASSOCIATED WITH DIABETES: ICD-10-CM

## 2024-12-05 DIAGNOSIS — E11.59 HYPERTENSION ASSOCIATED WITH DIABETES: ICD-10-CM

## 2024-12-05 DIAGNOSIS — E78.5 HYPERLIPIDEMIA ASSOCIATED WITH TYPE 2 DIABETES MELLITUS: ICD-10-CM

## 2024-12-05 LAB
ESTIMATED AVG GLUCOSE: 163 MG/DL (ref 68–131)
HBA1C MFR BLD: 7.3 % (ref 4–5.6)

## 2024-12-05 PROCEDURE — 1159F MED LIST DOCD IN RCRD: CPT | Mod: CPTII,S$GLB,, | Performed by: FAMILY MEDICINE

## 2024-12-05 PROCEDURE — 4010F ACE/ARB THERAPY RXD/TAKEN: CPT | Mod: CPTII,S$GLB,, | Performed by: FAMILY MEDICINE

## 2024-12-05 PROCEDURE — 1126F AMNT PAIN NOTED NONE PRSNT: CPT | Mod: CPTII,S$GLB,, | Performed by: FAMILY MEDICINE

## 2024-12-05 PROCEDURE — 3288F FALL RISK ASSESSMENT DOCD: CPT | Mod: CPTII,S$GLB,, | Performed by: FAMILY MEDICINE

## 2024-12-05 PROCEDURE — 99999 PR PBB SHADOW E&M-EST. PATIENT-LVL III: CPT | Mod: PBBFAC,,, | Performed by: FAMILY MEDICINE

## 2024-12-05 PROCEDURE — 1101F PT FALLS ASSESS-DOCD LE1/YR: CPT | Mod: CPTII,S$GLB,, | Performed by: FAMILY MEDICINE

## 2024-12-05 PROCEDURE — 83036 HEMOGLOBIN GLYCOSYLATED A1C: CPT | Performed by: FAMILY MEDICINE

## 2024-12-05 PROCEDURE — 3075F SYST BP GE 130 - 139MM HG: CPT | Mod: CPTII,S$GLB,, | Performed by: FAMILY MEDICINE

## 2024-12-05 PROCEDURE — 36415 COLL VENOUS BLD VENIPUNCTURE: CPT | Performed by: FAMILY MEDICINE

## 2024-12-05 PROCEDURE — G2211 COMPLEX E/M VISIT ADD ON: HCPCS | Mod: S$GLB,,, | Performed by: FAMILY MEDICINE

## 2024-12-05 PROCEDURE — 99214 OFFICE O/P EST MOD 30 MIN: CPT | Mod: S$GLB,,, | Performed by: FAMILY MEDICINE

## 2024-12-05 PROCEDURE — 3008F BODY MASS INDEX DOCD: CPT | Mod: CPTII,S$GLB,, | Performed by: FAMILY MEDICINE

## 2024-12-05 PROCEDURE — 3051F HG A1C>EQUAL 7.0%<8.0%: CPT | Mod: CPTII,S$GLB,, | Performed by: FAMILY MEDICINE

## 2024-12-05 PROCEDURE — 3079F DIAST BP 80-89 MM HG: CPT | Mod: CPTII,S$GLB,, | Performed by: FAMILY MEDICINE

## 2024-12-05 RX ORDER — CLOTRIMAZOLE 1 %
CREAM (GRAM) TOPICAL 2 TIMES DAILY
Qty: 60 G | Refills: 0 | Status: SHIPPED | OUTPATIENT
Start: 2024-12-05

## 2024-12-05 RX ORDER — ALLOPURINOL 100 MG/1
100 TABLET ORAL DAILY
Qty: 90 TABLET | Refills: 1 | Status: SHIPPED | OUTPATIENT
Start: 2024-12-05

## 2024-12-05 NOTE — PROGRESS NOTES
Subjective:       Patient ID: Demario Live is a 68 y.o. male.    Chief Complaint: f/u on chronic issues      HPI    Patient Active Problem List   Diagnosis    Hypertension    HLD (hyperlipidemia)    Erectile dysfunction    Gout    Prostate cancer    Acute deep vein thrombosis (DVT) of both lower extremities    Type 2 diabetes mellitus without complication, without long-term current use of insulin    Malignant carcinoid tumors of other sites    Over weight    Malignant neoplasm of pancreas, unspecified location of malignancy    Hypertension associated with diabetes       Past Medical History:   Diagnosis Date    Acute deep vein thrombosis (DVT) of both lower extremities 4/17/2023    Acute pulmonary embolism 4/17/2023    Chronic kidney disease, stage 3a 4/17/2023    HLD (hyperlipidemia) 2/15/2019    Hypertension     Neuroendocrine carcinoma of pancreas 4/21/2021    Type 2 diabetes mellitus without complication, without long-term current use of insulin 4/17/2023       Past Surgical History:   Procedure Laterality Date    LIPOMA RESECTION      PROSTATE SURGERY  02/14/2023       Family History   Problem Relation Name Age of Onset    Stroke Mother      Heart disease Father      Celiac disease Neg Hx      Hemochromatosis Neg Hx         Social History     Tobacco Use   Smoking Status Never    Passive exposure: Past   Smokeless Tobacco Never       Wt Readings from Last 5 Encounters:   12/05/24 98.9 kg (218 lb)   11/25/24 99.5 kg (219 lb 5.7 oz)   11/21/24 100.7 kg (222 lb 0.1 oz)   08/07/24 99.8 kg (220 lb 0.3 oz)   02/01/24 100.1 kg (220 lb 12.7 oz)     History of Present Illness    CHIEF COMPLAINT:  Patient presents today for follow-up.    ONCOLOGY:  He continues follow-up with Dr. Avelar for management of malignant carcinoid tumors. He underwent a scan last month and is scheduled for follow-up next week to review the results. He also has a history of prostate cancer, which is being monitored by Dr. Valdez, his  urologist. He had a recent visit with Dr. Valdez last week, which yielded favorable results per his report    HEPATOLOGY:  He recently visited the hepatology team at the end of November due to elevated liver function tests and an abnormal coagulation profile. An ultrasound and labs were ordered. Recent lab results show a slight improvement in liver function levels, though the hepatologist has not yet commented on these results.    CARDIOVASCULAR:  He was transitioned from amlodipine to lisinopril 40 mg for hypertension management, considering his diabetes. He has a history of blood clots but is no longer on anticoagulants. He denies any current swelling problems or trouble breathing or extremity swelling    ENDOCRINE:  He has type 2 diabetes. His last A1C was 7.1. A new A1C has been ordered to reassess glycemic control. He is not currently on any specific medications for diabetes. Open to starting if need be    OPHTHALMOLOGY:  He recently had a diabetes-related eye exam. The ophthalmologist diagnosed open angle with borderline findings at high glaucoma risk in both eyes. Cataracts are present but not visually significant. A follow-up visit is scheduled    GASTROINTESTINAL:  A previously scheduled endoscopy in October was canceled due to a death in the family. His colonoscopy was also canceled. He expects to be contacted by the facility to reschedule the procedure before his next appointment. He is aware if they don't contact him to let us know    MUSCULOSKELETAL:  He is taking allopurinol at a low dose for gout management and has colchicine available for flare-ups. He experiences gout flare-ups every 3-6 months, occasionally going up to a year without an episode. He denies current foot discomfort related to gout and is uncertain about the triggers for his gout flares.    MEDICATIONS:  Current medications include lisinopril 40 mg, Lipitor 10 mg, allopurinol (low dose), Viagra 100 mg as needed, and colchicine for gout  flare-ups.    LABS:  Recent labs show slightly low hemoglobin at 12.9, while hematocrit is within normal range. LDL was 61, indicating well-controlled cholesterol levels.    PREVENTIVE CARE:  He expresses interest in receiving the shingles vaccine as previously recommended.    SEXUAL HEALTH:  He reports improvement in erectile dysfunction symptoms after increasing the dose of Viagra to 100 mg.           Objective:      Vitals:    12/05/24 1042   BP: 136/84   Pulse: 100   Resp: 18   Temp: 96 °F (35.6 °C)       Physical Exam  Constitutional:       General: He is not in acute distress.     Appearance: He is not ill-appearing.   Pulmonary:      Effort: Pulmonary effort is normal. No respiratory distress.   Neurological:      General: No focal deficit present.      Mental Status: He is alert.   Psychiatric:         Mood and Affect: Mood normal.         Behavior: Behavior normal.       Protective Sensation (w/ 10 gram monofilament):  Right: Intact  Left: Intact    Visual Inspection:  Callus -  Bilateral dry skin. Circumscribed dry skin lesion on top of Left foot. Bunion appearing bulge on foot    Pedal Pulses:   Right: Present  Left: Present    Posterior Tibialis Pulses:   Right:Present  Left: Present    Assessment:       1. Hyperlipidemia associated with type 2 diabetes mellitus    2. Malignant carcinoid tumors of other sites    3. Prostate cancer    4. Hypertension, unspecified type    5. Hypertension associated with diabetes    6. Gout, unspecified cause, unspecified chronicity, unspecified site    7. Erectile dysfunction, unspecified erectile dysfunction type    8. Personal history of gout        Plan:   Assessment & Plan    PLAN SUMMARY:  Continue lisinopril 40mg daily for hypertension  Refill colchicine for as-needed use in gout flare-ups  Continue Lipitor 10mg daily for hyperlipidemia  Order shingles vaccine  Continue low-dose allopurinol daily for gout  Continue prn Viagra to 100mg as needed for erectile  dysfunction  Order A1C test  Start clotrimazole cream, apply to feet twice daily for fungal rash  Follow up in 4 months  Contact office if colonoscopy is not scheduled by monologue team  Contact office if A1C results are unsatisfactory to schedule virtual visit  Contact office with significant updates from upcoming oncology appointment    PROSTATE CANCER:  Prostate cancer being monitored by Dr. Valdez; recent test results good.  Contact office with any significant updates from upcoming oncology appointment.    HYPERLIPIDEMIA:  Continued Lipitor 10mg for hyperlipidemia; LDL at 61, cholesterol well-controlled.  Continued Lipitor 10mg daily for hyperlipidemia.    TYPE 2 DIABETES MELLITUS:  Monitoring diabetes without specific medications; last A1C 7.1.  Ordered A1C test.  Contact office if A1C results are not satisfactory to schedule a virtual visit to discuss med options    HYPERTENSION:  Continued lisinopril 40mg for hypertension; blood pressure well-controlled.  Educated on signs of angioedema related to lisinopril use (swelling of lips, mouth, tongue).  Continued lisinopril 40mg daily for hypertension.    ERECTILE DYSFUNCTION:  Increased Viagra to 100mg for erectile dysfunction with reported improvement.  Increased Viagra to 100mg as needed for erectile dysfunction.    HISTORY OF VENOUS THROMBOSIS:  History of blood clots, no current anticoagulation therapy.    GOUT:  Continued low-dose allopurinol for gout; considering future drug holiday.  Discussed potential causes of gout flare-ups, clarifying misconceptions about diet triggers.  Continued allopurinol (low dose) daily for gout.  Refilled colchicine for as-needed use in gout flare-ups.    HALLUX VALGUS:  Observed skin changes on feet; diagnosed bunion, not gout-related.    TINEA PEDIS:  Identified fungal skin rash on feet.  Started clotrimazole cream, apply to feet twice daily for fungal rash.    Offered podiatry consult - given no complaints doesn't feel  needed.    FOLLOW-UP:  Noted slightly low hemoglobin (12.9) and normal hematocrit; will monitor.  Ordered shingles vaccine.  Follow up in 4 months.  Contact office if colonoscopy is not scheduled

## 2024-12-12 ENCOUNTER — TELEPHONE (OUTPATIENT)
Dept: HEPATOLOGY | Facility: CLINIC | Age: 69
End: 2024-12-12
Payer: MEDICARE

## 2024-12-12 NOTE — TELEPHONE ENCOUNTER
----- Message from Ashley sent at 12/12/2024  2:55 PM CST -----  Contact: Demario  Type:  Sooner Apoointment Request    Caller is requesting a sooner appointment. Caller will not accept being placed on the waitlist and is requesting a message be sent to doctor.  Name of Caller:Demario  When is the first available appointment?12/19/24  Symptoms:Ultrasound/Fibroscan  Would the patient rather a call back or a response via [facility]? call  Best Call Back Number:[phone]  Additional Information: Patient reports having a scheduling conflict with imaging scans and request to reschedule. Please give patient a call back to assist.   Thank you,  GH

## 2024-12-19 ENCOUNTER — OFFICE VISIT (OUTPATIENT)
Dept: OPHTHALMOLOGY | Facility: CLINIC | Age: 69
End: 2024-12-19
Payer: MEDICARE

## 2024-12-19 ENCOUNTER — PROCEDURE VISIT (OUTPATIENT)
Dept: HEPATOLOGY | Facility: CLINIC | Age: 69
End: 2024-12-19
Payer: MEDICARE

## 2024-12-19 VITALS — WEIGHT: 218.25 LBS | BODY MASS INDEX: 28.01 KG/M2 | HEIGHT: 74 IN

## 2024-12-19 DIAGNOSIS — C61 PROSTATE CANCER: ICD-10-CM

## 2024-12-19 DIAGNOSIS — H25.13 NUCLEAR SCLEROSIS OF BOTH EYES: ICD-10-CM

## 2024-12-19 DIAGNOSIS — H40.023 OPEN ANGLE WITH BORDERLINE FINDINGS AND HIGH GLAUCOMA RISK IN BOTH EYES: Primary | ICD-10-CM

## 2024-12-19 DIAGNOSIS — C25.9 MALIGNANT NEOPLASM OF PANCREAS, UNSPECIFIED LOCATION OF MALIGNANCY: ICD-10-CM

## 2024-12-19 DIAGNOSIS — E11.9 DIABETES MELLITUS TYPE 2 WITHOUT RETINOPATHY: ICD-10-CM

## 2024-12-19 DIAGNOSIS — R74.8 ELEVATED LIVER ENZYMES: ICD-10-CM

## 2024-12-19 PROCEDURE — 99999 PR PBB SHADOW E&M-EST. PATIENT-LVL II: CPT | Mod: PBBFAC,,, | Performed by: OPHTHALMOLOGY

## 2024-12-19 PROCEDURE — 4010F ACE/ARB THERAPY RXD/TAKEN: CPT | Mod: CPTII,S$GLB,, | Performed by: OPHTHALMOLOGY

## 2024-12-19 PROCEDURE — 91200 LIVER ELASTOGRAPHY: CPT | Mod: S$GLB,,, | Performed by: INTERNAL MEDICINE

## 2024-12-19 PROCEDURE — 3051F HG A1C>EQUAL 7.0%<8.0%: CPT | Mod: CPTII,S$GLB,, | Performed by: OPHTHALMOLOGY

## 2024-12-19 PROCEDURE — 1160F RVW MEDS BY RX/DR IN RCRD: CPT | Mod: CPTII,S$GLB,, | Performed by: OPHTHALMOLOGY

## 2024-12-19 PROCEDURE — 99213 OFFICE O/P EST LOW 20 MIN: CPT | Mod: S$GLB,,, | Performed by: OPHTHALMOLOGY

## 2024-12-19 PROCEDURE — 91200 LIVER ELASTOGRAPHY: CPT | Mod: S$GLB,,, | Performed by: NURSE PRACTITIONER

## 2024-12-19 PROCEDURE — 1159F MED LIST DOCD IN RCRD: CPT | Mod: CPTII,S$GLB,, | Performed by: OPHTHALMOLOGY

## 2024-12-19 PROCEDURE — 92083 EXTENDED VISUAL FIELD XM: CPT | Mod: S$GLB,,, | Performed by: OPHTHALMOLOGY

## 2024-12-19 NOTE — PROGRESS NOTES
HPI     Glaucoma     Additional comments: 4m IOP check with HV[facility]. Denies pain or irritation. Va   stable.           Last edited by Davy Stinson on 12/19/2024  8:10 AM.            Assessment /Plan     For exam results, see Encounter Report.    Open angle with borderline findings and high glaucoma risk in both eyes  HVF 24-2 full OU. No evidence of glaucoma at this time but based on risk factors recommend to continue monitoring.     Nuclear sclerosis of both eyes  Cataracts are present but not visually significant. Will continue to monitor.    Diabetes mellitus type 2 without retinopathy   Last A1c 7.3 . Diabetes controlled with no diabetic retinopathy on last dilated exam. Reviewed diabetic eye precautions including excellent blood sugar control, and importance of regular follow up.      RTC 6 months gOCT and DIL

## 2024-12-19 NOTE — PROCEDURES
FibroScan (Vibration Controlled Transient Elastography)    Date/Time: 12/19/2024 9:30 AM    Performed by: Shilpi Diez RN  Authorized by: Kevin Coleman FNP    Diagnosis:  NAFLD    Probe:  XL    Universal Protocol: Patient's identity, procedure and site were verified, confirmatory pause was performed.  Discussed procedure including risks and potential complications.  Questions answered.  Patient verbalizes understanding and wishes to proceed with VCTE.     Procedure: After providing explanations of the procedure, patient was placed in the supine position with right arm in maximum abduction to allow optimal exposure of right lateral abdomen.  Patient was briefly assessed, Testing was performed in the mid-axillary location, 50Hz Shear Wave pulses were applied and the resulting Shear Wave and Propagation Speed detected with a 3.5 MHz ultrasonic signal, using the FibroScan probe, Skin to liver capsule distance and liver parenchyma were accessed during the entire examination with the FibroScan probe, Patient was instructed to breathe normally and to abstain from sudden movements during the procedure, allowing for random measurements of liver stiffness. At least 10 Shear Waves were produced, Individual measurements of each Shear Wave were calculated.  Patient tolerated the procedure well with no complications.  Meets discharge criteria as was dismissed.  Rates pain 0 out of 10.  Patient will follow up with ordering provider to review results.    Findings  Median liver stiffness score:  6.9  CAP Reading: dB/m:  244    IQR/med %:  14  Interpretation  Fibrosis interpretation is based on medial liver stiffness - Kilopascal (kPa).    Fibrosis Stage:  F 0-1  Steatosis interpretation is based on controlled attenuation parameter - (dB/m).    Steatosis Grade:  <S1  Comments/Plan:  No steatosis and mild fibrosis

## 2024-12-27 ENCOUNTER — HOSPITAL ENCOUNTER (OUTPATIENT)
Dept: PREADMISSION TESTING | Facility: HOSPITAL | Age: 69
Discharge: HOME | End: 2024-12-27
Attending: INTERNAL MEDICINE
Payer: MEDICARE

## 2024-12-27 DIAGNOSIS — Z12.11 SCREENING FOR COLON CANCER: Primary | ICD-10-CM

## 2024-12-27 RX ORDER — POLYETHYLENE GLYCOL 3350, SODIUM SULFATE, POTASSIUM CHLORIDE, MAGNESIUM SULFATE, AND SODIUM CHLORIDE FOR ORAL SOLUTION 178.7-7.3G
2 KIT ORAL SEE ADMIN INSTRUCTIONS
Qty: 1 EACH | Refills: 0 | Status: SHIPPED | OUTPATIENT
Start: 2024-12-27

## 2024-12-30 ENCOUNTER — HOSPITAL ENCOUNTER (OUTPATIENT)
Dept: RADIOLOGY | Facility: HOSPITAL | Age: 69
Discharge: HOME | End: 2024-12-30
Attending: NURSE PRACTITIONER
Payer: MEDICARE

## 2024-12-30 DIAGNOSIS — R74.8 ELEVATED LIVER ENZYMES: ICD-10-CM

## 2024-12-30 DIAGNOSIS — C25.9 MALIGNANT NEOPLASM OF PANCREAS, UNSPECIFIED LOCATION OF MALIGNANCY: ICD-10-CM

## 2024-12-30 DIAGNOSIS — C61 PROSTATE CANCER: ICD-10-CM

## 2024-12-30 PROCEDURE — 76700 US EXAM ABDOM COMPLETE: CPT | Mod: TC

## 2024-12-30 PROCEDURE — 76700 US EXAM ABDOM COMPLETE: CPT | Mod: 26,,, | Performed by: STUDENT IN AN ORGANIZED HEALTH CARE EDUCATION/TRAINING PROGRAM

## 2025-01-29 NOTE — TELEPHONE ENCOUNTER
"Faxed over pre-op clearance, office notes, labs, ekg and chest x-ray to Y[facility] at [phone]    Your fax has been successfully sent to [phone] at [phone].  ------------------------------------------------------------  From: [id number]  ------------------------------------------------------------  1/31/2023 8:57:33 AM Transmission Record   Sent to [phone] with remote ID "[id number]"   Result: (0/339;0/0) Success   Page record: 1 - 24   Elapsed time: 08:13 on channel 55    " [As Noted in HPI] : as noted in HPI [Negative] : Constitutional

## 2025-02-10 NOTE — TELEPHONE ENCOUNTER
Patient called in requesting medication because of severe back pain , patient declined setting up appointment.     Please reach out to patient to advise of next steps.    Pt states he will call for an appt. Verbalized understanding.

## 2025-03-11 ENCOUNTER — PATIENT OUTREACH (OUTPATIENT)
Dept: ADMINISTRATIVE | Facility: HOSPITAL | Age: 70
End: 2025-03-11
Payer: MEDICARE

## 2025-03-12 ENCOUNTER — OFFICE VISIT (OUTPATIENT)
Dept: INTERNAL MEDICINE | Facility: CLINIC | Age: 70
End: 2025-03-12
Payer: MEDICARE

## 2025-03-12 VITALS
TEMPERATURE: 96 F | WEIGHT: 218.5 LBS | DIASTOLIC BLOOD PRESSURE: 82 MMHG | BODY MASS INDEX: 28.04 KG/M2 | HEIGHT: 74 IN | OXYGEN SATURATION: 99 % | HEART RATE: 104 BPM | SYSTOLIC BLOOD PRESSURE: 138 MMHG

## 2025-03-12 DIAGNOSIS — M10.9 GOUT, UNSPECIFIED CAUSE, UNSPECIFIED CHRONICITY, UNSPECIFIED SITE: ICD-10-CM

## 2025-03-12 DIAGNOSIS — Z79.899 ENCOUNTER FOR LONG-TERM (CURRENT) USE OF MEDICATIONS: ICD-10-CM

## 2025-03-12 DIAGNOSIS — I10 HYPERTENSION, UNSPECIFIED TYPE: ICD-10-CM

## 2025-03-12 DIAGNOSIS — C7A.098 MALIGNANT CARCINOID TUMORS OF OTHER SITES: ICD-10-CM

## 2025-03-12 DIAGNOSIS — B35.3 TINEA PEDIS, UNSPECIFIED LATERALITY: Primary | ICD-10-CM

## 2025-03-12 DIAGNOSIS — E78.5 HYPERLIPIDEMIA ASSOCIATED WITH TYPE 2 DIABETES MELLITUS: ICD-10-CM

## 2025-03-12 DIAGNOSIS — Z12.11 SCREENING FOR COLON CANCER: ICD-10-CM

## 2025-03-12 DIAGNOSIS — E11.69 HYPERLIPIDEMIA ASSOCIATED WITH TYPE 2 DIABETES MELLITUS: ICD-10-CM

## 2025-03-12 DIAGNOSIS — C61 PROSTATE CANCER: ICD-10-CM

## 2025-03-12 DIAGNOSIS — N52.9 ERECTILE DYSFUNCTION, UNSPECIFIED ERECTILE DYSFUNCTION TYPE: ICD-10-CM

## 2025-03-12 PROCEDURE — 4010F ACE/ARB THERAPY RXD/TAKEN: CPT | Mod: CPTII,S$GLB,, | Performed by: FAMILY MEDICINE

## 2025-03-12 PROCEDURE — 99999 PR PBB SHADOW E&M-EST. PATIENT-LVL IV: CPT | Mod: PBBFAC,,, | Performed by: FAMILY MEDICINE

## 2025-03-12 PROCEDURE — 3072F LOW RISK FOR RETINOPATHY: CPT | Mod: CPTII,S$GLB,, | Performed by: FAMILY MEDICINE

## 2025-03-12 PROCEDURE — 3075F SYST BP GE 130 - 139MM HG: CPT | Mod: CPTII,S$GLB,, | Performed by: FAMILY MEDICINE

## 2025-03-12 PROCEDURE — 99214 OFFICE O/P EST MOD 30 MIN: CPT | Mod: S$GLB,,, | Performed by: FAMILY MEDICINE

## 2025-03-12 PROCEDURE — 1126F AMNT PAIN NOTED NONE PRSNT: CPT | Mod: CPTII,S$GLB,, | Performed by: FAMILY MEDICINE

## 2025-03-12 PROCEDURE — 3008F BODY MASS INDEX DOCD: CPT | Mod: CPTII,S$GLB,, | Performed by: FAMILY MEDICINE

## 2025-03-12 PROCEDURE — 1159F MED LIST DOCD IN RCRD: CPT | Mod: CPTII,S$GLB,, | Performed by: FAMILY MEDICINE

## 2025-03-12 PROCEDURE — G2211 COMPLEX E/M VISIT ADD ON: HCPCS | Mod: S$GLB,,, | Performed by: FAMILY MEDICINE

## 2025-03-12 PROCEDURE — 3079F DIAST BP 80-89 MM HG: CPT | Mod: CPTII,S$GLB,, | Performed by: FAMILY MEDICINE

## 2025-03-12 RX ORDER — CLOTRIMAZOLE AND BETAMETHASONE DIPROPIONATE 10; .64 MG/G; MG/G
CREAM TOPICAL 2 TIMES DAILY
Qty: 45 G | Refills: 1 | Status: SHIPPED | OUTPATIENT
Start: 2025-03-12

## 2025-03-12 RX ORDER — CLOTRIMAZOLE 1 %
CREAM (GRAM) TOPICAL 2 TIMES DAILY
Qty: 60 G | Refills: 0 | Status: SHIPPED | OUTPATIENT
Start: 2025-03-12

## 2025-03-12 RX ORDER — METFORMIN HYDROCHLORIDE 500 MG/1
500 TABLET ORAL 2 TIMES DAILY WITH MEALS
Qty: 180 TABLET | Refills: 3 | Status: SHIPPED | OUTPATIENT
Start: 2025-03-12 | End: 2025-06-10

## 2025-03-12 NOTE — PROGRESS NOTES
Please advised, Lov 1/20/20 Vinny patient.    Subjective:       Patient ID: Demario Live is a 69 y.o. male.    Chief Complaint: Follow-up (DM)    HPI    Problem List[1]    Past Medical History:   Diagnosis Date    Acute deep vein thrombosis (DVT) of both lower extremities 4/17/2023    Acute pulmonary embolism 4/17/2023    Chronic kidney disease, stage 3a 4/17/2023    HLD (hyperlipidemia) 2/15/2019    Hypertension     Neuroendocrine carcinoma of pancreas 4/21/2021    Type 2 diabetes mellitus without complication, without long-term current use of insulin 4/17/2023       Past Surgical History:   Procedure Laterality Date    LIPOMA RESECTION      PROSTATE SURGERY  02/14/2023       Family History   Problem Relation Name Age of Onset    Stroke Mother      Heart disease Father      Celiac disease Neg Hx      Hemochromatosis Neg Hx         Tobacco Use History[2]    Wt Readings from Last 5 Encounters:   03/12/25 99.1 kg (218 lb 7.6 oz)   12/19/24 99 kg (218 lb 4.1 oz)   12/05/24 98.9 kg (218 lb)   11/25/24 99.5 kg (219 lb 5.7 oz)   11/21/24 100.7 kg (222 lb 0.1 oz)       For further HPI details, see assessment and plan.    Review of Systems    Objective:      Vitals:    03/12/25 0826   BP: 138/82   Pulse:    Temp:        Physical Exam  Constitutional:       General: He is not in acute distress.     Appearance: He is not ill-appearing.   Pulmonary:      Effort: Pulmonary effort is normal. No respiratory distress.   Neurological:      General: No focal deficit present.      Mental Status: He is alert.   Psychiatric:         Mood and Affect: Mood normal.         Behavior: Behavior normal.         Assessment:       1. Tinea pedis, unspecified laterality    2. Hyperlipidemia associated with type 2 diabetes mellitus    3. Prostate cancer    4. Malignant carcinoid tumors of other sites    5. Hypertension, unspecified type    6. Gout, unspecified cause, unspecified chronicity, unspecified site    7. Erectile dysfunction, unspecified erectile dysfunction type    8. Screening  for colon cancer        Plan:       Presents to clinic for follow-up on chronic conditions     Neuroendocrine tumor   Working with Hematology Oncology and gets monthly infusion. Continue working w specialist.    Hematology also monitoring an iron-deficiency anemia due to chronic blood loss    Prostate cancer   Also working with the urologist   Dr. Valdez   Continue monitoring    Hyperlipidemia   Patient is on Lipitor . tolerating  Lab Results   Component Value Date    LDLCALC 61.8 (L) 08/07/2024   Well-controlled.  Continue statin.  We will check in August     DM II  Lab Results   Component Value Date    HGBA1C 7.3 (H) 12/05/2024   Diabetes decent given age but would prefer tighter control if possible. We will continue to monitor.    Patient was never tried metformin in the past.  Renal functions normal.  I am going to start him on metformin 500 mg twice a day.  We will monitor blood glucose levels and renal function going forward.  Discussed potential GI side effects    Hypertension   Blood pressure is elevated today   BP Readings from Last 3 Encounters:   03/12/25 138/82   12/05/24 136/84   11/25/24 (!) 151/88   Variable  138/82 yesterday.  Reports typically in  130s/80s  Will continue for now.  Daughter in law in an NP and checks it for him - encouraged closer monitoring to ensure well controlled.  Will continue current med lisinopril 40    ED  Prn viagra  Helps  No side effects  Continue prn    Gout  Lab Results   Component Value Date    URICACID 4.5 08/07/2024   No recent flare ups.  Has prn colchine.  Continue allopurinol at present dosage    Tinea pedis  Improved w/ clotrimoazole but needs refill.    Elevated LFT  Saw hepatology in 11/24  Mild fibrosis noted on Fibroscan  Doesn't drink alcohol  Uncertain of plan or f/u - will ensure doesn't fall thru cracks and messaging Hep    Screening for colon cancer   Patient was well overdue for colonoscopy per his report.  Feels it has been over 10 years.  We will  order.  Advise he expect telephone call for scheduling and instruction purposes.    Posterior neck rash   Appears to be like a folliculitis but there is a more diffuse fungal looking appearance as well.  Prescribing clotrimazole with betamethasone.  Its use clotrimazole by itself on his feet and clotrimazole with betamethasone on his neck.  Twice daily for the next 10 days.  If not getting better I want him to let me know and we will send him to Dermatology    Plan to update lab in 3 months.  See me soon after lab is drawn in 3 mo.        This note was verbally dictated, please excuse any type errors.         [1]   Patient Active Problem List  Diagnosis    Hypertension    HLD (hyperlipidemia)    Erectile dysfunction    Gout    Prostate cancer    Acute deep vein thrombosis (DVT) of both lower extremities    Type 2 diabetes mellitus without complication, without long-term current use of insulin    Malignant carcinoid tumors of other sites    Over weight    Malignant neoplasm of pancreas, unspecified location of malignancy    Hypertension associated with diabetes   [2]   Social History  Tobacco Use   Smoking Status Never    Passive exposure: Past   Smokeless Tobacco Never

## 2025-03-18 ENCOUNTER — HOSPITAL ENCOUNTER (OUTPATIENT)
Dept: PREADMISSION TESTING | Facility: HOSPITAL | Age: 70
Discharge: HOME | End: 2025-03-18
Attending: FAMILY MEDICINE
Payer: MEDICARE

## 2025-03-18 DIAGNOSIS — Z12.11 SCREENING FOR COLON CANCER: ICD-10-CM

## 2025-03-31 ENCOUNTER — HOSPITAL ENCOUNTER (OUTPATIENT)
Dept: PREADMISSION TESTING | Facility: HOSPITAL | Age: 70
Discharge: HOME | End: 2025-03-31
Attending: COLON & RECTAL SURGERY
Payer: MEDICARE

## 2025-03-31 DIAGNOSIS — Z12.11 SCREENING FOR COLON CANCER: Primary | ICD-10-CM

## 2025-03-31 RX ORDER — SODIUM, POTASSIUM,MAG SULFATES 17.5-3.13G
1 SOLUTION, RECONSTITUTED, ORAL ORAL DAILY
Qty: 1 KIT | Refills: 0 | Status: SHIPPED | OUTPATIENT
Start: 2025-03-31 | End: 2025-04-02

## 2025-04-10 ENCOUNTER — PATIENT MESSAGE (OUTPATIENT)
Dept: HEMATOLOGY/ONCOLOGY | Facility: CLINIC | Age: 70
End: 2025-04-10
Payer: MEDICARE

## 2025-04-11 ENCOUNTER — PATIENT MESSAGE (OUTPATIENT)
Dept: HEMATOLOGY/ONCOLOGY | Facility: CLINIC | Age: 70
End: 2025-04-11
Payer: MEDICARE

## 2025-04-11 ENCOUNTER — TELEPHONE (OUTPATIENT)
Dept: HEMATOLOGY/ONCOLOGY | Facility: CLINIC | Age: 70
End: 2025-04-11
Payer: MEDICARE

## 2025-04-14 ENCOUNTER — PATIENT MESSAGE (OUTPATIENT)
Dept: ADMINISTRATIVE | Facility: OTHER | Age: 70
End: 2025-04-14
Payer: MEDICARE

## 2025-04-21 ENCOUNTER — PATIENT MESSAGE (OUTPATIENT)
Dept: ADMINISTRATIVE | Facility: OTHER | Age: 70
End: 2025-04-21
Payer: MEDICARE

## 2025-04-30 ENCOUNTER — RESULTS FOLLOW-UP (OUTPATIENT)
Dept: HEPATOLOGY | Facility: CLINIC | Age: 70
End: 2025-04-30
Payer: MEDICARE

## 2025-04-30 DIAGNOSIS — R74.8 ELEVATED LIVER ENZYMES: Primary | ICD-10-CM

## 2025-05-05 ENCOUNTER — TELEPHONE (OUTPATIENT)
Dept: HEPATOLOGY | Facility: CLINIC | Age: 70
End: 2025-05-05
Payer: MEDICARE

## 2025-05-05 DIAGNOSIS — R74.8 ELEVATED LIVER ENZYMES: Primary | ICD-10-CM

## 2025-05-05 DIAGNOSIS — N28.1 RENAL CYST: ICD-10-CM

## 2025-05-05 DIAGNOSIS — K76.9 CHRONIC LIVER DISEASE: ICD-10-CM

## 2025-05-05 DIAGNOSIS — Z85.05 HISTORY OF LIVER CANCER: ICD-10-CM

## 2025-05-05 RX ORDER — ALLOPURINOL 100 MG/1
100 TABLET ORAL DAILY
Qty: 90 TABLET | Refills: 0 | Status: SHIPPED | OUTPATIENT
Start: 2025-05-05

## 2025-05-05 NOTE — TELEPHONE ENCOUNTER
I called the patient several times before to follow up but no answer. Today I finally got hold of him.    The patient reported that he is still following up with his Oncologist Dr. Power for his cancer and pancreatic mass and they do CT imaging every 3 months.    He reported that his PCP told him that his renal cysts are ok and he has not yet seen any Nephrologist.     I offered him Nephrologist referral.     I informed him that he should follow up with Dr. King for sign of cirrhosis on his ultrasound liver imaging.

## 2025-05-05 NOTE — TELEPHONE ENCOUNTER
Care Due:                  Date            Visit Type   Department     Provider  --------------------------------------------------------------------------------                                EP -                              PRIMARY      [facility] INTERNAL  Last Visit: 03-      CARE ([facility])   SAMIR Abraham                              EP -                              PRIMARY      [facility] INTERNAL  Next Visit: 06-      CARE ([facility])   SAMIR Abraham                                                            Last  Test          Frequency    Reason                     Performed    Due Date  --------------------------------------------------------------------------------    HBA1C.......  6 months...  metFORMIN................  12- 06-    Lipid Panel.  12 months..  atorvastatin.............  08- 08-    Uric Acid...  12 months..  allopurinoL..............  08- 08-    [facility] Embedded Care Due Messages. Reference number: [id number].   5/05/2025 12:21:16 PM CDT

## 2025-05-05 NOTE — TELEPHONE ENCOUNTER
----- Message from Christopher sent at 5/5/2025 12:11 PM CDT -----  Type: RX Refill RequestWho Called: Patient Refill or New RX:Refill RX Name and Strength: allopurinoL (ZYLOPRIM) 100 MG tablet.Preferred Pharmacy with Phone Number:[facility]/pharmacy #[id number] - [place] [address] [address]jgnj: [phone] Fax: [phone] Best Call Back Number: [phone] Additional Information:

## 2025-05-06 ENCOUNTER — PATIENT MESSAGE (OUTPATIENT)
Dept: HEPATOLOGY | Facility: CLINIC | Age: 70
End: 2025-05-06
Payer: MEDICARE

## 2025-05-06 NOTE — TELEPHONE ENCOUNTER
Provider Staff:  Action required for this patient    Requires labs      Please see care gap opportunities below in Care Due Message.    Thanks!  [facility]     Appointments      Date Provider   Last Visit   3/12/2025 Keegan Abraham MD   Next Visit   6/17/2025 Keegan Abraham MD     Refill Decision Note   Demario Live  is requesting a refill authorization.  Brief Assessment and Rationale for Refill:  Approve     Medication Therapy Plan:        Comments:     Note composed:9:12 PM 05/05/2025

## 2025-05-16 ENCOUNTER — PATIENT OUTREACH (OUTPATIENT)
Dept: ADMINISTRATIVE | Facility: HOSPITAL | Age: 70
End: 2025-05-16
Payer: MEDICARE

## 2025-05-16 NOTE — PROGRESS NOTES
[facility] Score: 1     Colon Cancer Screening    Tetanus Vaccine  Shingles/Zoster Vaccine  RSV Vaccine            Pt has labs scheduled 6/10/25 and follow up on 6/17/25.  Discussed colonoscopy, order placed at last visit in March. States he has been out of town helping his son last couple months.  Offered to give him number to endo schedulers, said he does not have a pen. Advised him would send contact info through portal.    [facility] closed-  Does Not Meet Criteria for Program  [facility] Value Based Care Coordination Program  [facility] VBC Auto Enrollment Filter Rule

## 2025-06-03 ENCOUNTER — PATIENT OUTREACH (OUTPATIENT)
Dept: ADMINISTRATIVE | Facility: HOSPITAL | Age: 70
End: 2025-06-03
Payer: MEDICARE

## 2025-06-10 ENCOUNTER — PATIENT OUTREACH (OUTPATIENT)
Dept: ADMINISTRATIVE | Facility: HOSPITAL | Age: 70
End: 2025-06-10
Payer: MEDICARE

## 2025-06-10 ENCOUNTER — HOSPITAL ENCOUNTER (OUTPATIENT)
Dept: RADIOLOGY | Facility: HOSPITAL | Age: 70
Discharge: HOME | End: 2025-06-10
Attending: NURSE PRACTITIONER
Payer: MEDICARE

## 2025-06-10 DIAGNOSIS — R74.8 ELEVATED LIVER ENZYMES: ICD-10-CM

## 2025-06-10 PROCEDURE — 76705 ECHO EXAM OF ABDOMEN: CPT | Mod: 26,,, | Performed by: RADIOLOGY

## 2025-06-10 PROCEDURE — 76705 ECHO EXAM OF ABDOMEN: CPT | Mod: TC

## 2025-06-11 ENCOUNTER — RESULTS FOLLOW-UP (OUTPATIENT)
Dept: INTERNAL MEDICINE | Facility: CLINIC | Age: 70
End: 2025-06-11

## 2025-06-17 ENCOUNTER — OFFICE VISIT (OUTPATIENT)
Dept: INTERNAL MEDICINE | Facility: CLINIC | Age: 70
End: 2025-06-17
Payer: MEDICARE

## 2025-06-17 VITALS
TEMPERATURE: 98 F | HEART RATE: 106 BPM | OXYGEN SATURATION: 100 % | DIASTOLIC BLOOD PRESSURE: 76 MMHG | SYSTOLIC BLOOD PRESSURE: 136 MMHG | RESPIRATION RATE: 18 BRPM | BODY MASS INDEX: 27.73 KG/M2 | WEIGHT: 216 LBS

## 2025-06-17 DIAGNOSIS — D64.9 MILD ANEMIA: ICD-10-CM

## 2025-06-17 DIAGNOSIS — E11.65 TYPE 2 DIABETES MELLITUS WITH HYPERGLYCEMIA, WITHOUT LONG-TERM CURRENT USE OF INSULIN: ICD-10-CM

## 2025-06-17 DIAGNOSIS — Z12.11 SCREENING FOR COLON CANCER: ICD-10-CM

## 2025-06-17 DIAGNOSIS — E11.69 HYPERLIPIDEMIA ASSOCIATED WITH TYPE 2 DIABETES MELLITUS: ICD-10-CM

## 2025-06-17 DIAGNOSIS — E78.5 HYPERLIPIDEMIA ASSOCIATED WITH TYPE 2 DIABETES MELLITUS: ICD-10-CM

## 2025-06-17 DIAGNOSIS — I15.2 HYPERTENSION ASSOCIATED WITH DIABETES: ICD-10-CM

## 2025-06-17 DIAGNOSIS — R94.6 ABNORMAL RESULTS OF THYROID FUNCTION STUDIES: ICD-10-CM

## 2025-06-17 DIAGNOSIS — C7A.8 NEUROENDOCRINE CARCINOMA OF PANCREAS: Primary | ICD-10-CM

## 2025-06-17 DIAGNOSIS — M10.9 GOUT, UNSPECIFIED CAUSE, UNSPECIFIED CHRONICITY, UNSPECIFIED SITE: ICD-10-CM

## 2025-06-17 DIAGNOSIS — L73.9 FOLLICULITIS: ICD-10-CM

## 2025-06-17 DIAGNOSIS — R79.89 ELEVATED TSH: ICD-10-CM

## 2025-06-17 DIAGNOSIS — C25.9 MALIGNANT NEOPLASM OF PANCREAS, UNSPECIFIED LOCATION OF MALIGNANCY: ICD-10-CM

## 2025-06-17 DIAGNOSIS — E11.59 HYPERTENSION ASSOCIATED WITH DIABETES: ICD-10-CM

## 2025-06-17 DIAGNOSIS — C61 PROSTATE CANCER: ICD-10-CM

## 2025-06-17 DIAGNOSIS — B35.3 TINEA PEDIS, UNSPECIFIED LATERALITY: ICD-10-CM

## 2025-06-17 PROCEDURE — 99999 PR PBB SHADOW E&M-EST. PATIENT-LVL III: CPT | Mod: PBBFAC,,, | Performed by: FAMILY MEDICINE

## 2025-06-17 PROCEDURE — 3066F NEPHROPATHY DOC TX: CPT | Mod: CPTII,S$GLB,, | Performed by: FAMILY MEDICINE

## 2025-06-17 PROCEDURE — 99214 OFFICE O/P EST MOD 30 MIN: CPT | Mod: S$GLB,,, | Performed by: FAMILY MEDICINE

## 2025-06-17 PROCEDURE — 3051F HG A1C>EQUAL 7.0%<8.0%: CPT | Mod: CPTII,S$GLB,, | Performed by: FAMILY MEDICINE

## 2025-06-17 PROCEDURE — 3288F FALL RISK ASSESSMENT DOCD: CPT | Mod: CPTII,S$GLB,, | Performed by: FAMILY MEDICINE

## 2025-06-17 PROCEDURE — 4010F ACE/ARB THERAPY RXD/TAKEN: CPT | Mod: CPTII,S$GLB,, | Performed by: FAMILY MEDICINE

## 2025-06-17 PROCEDURE — 1126F AMNT PAIN NOTED NONE PRSNT: CPT | Mod: CPTII,S$GLB,, | Performed by: FAMILY MEDICINE

## 2025-06-17 PROCEDURE — 3072F LOW RISK FOR RETINOPATHY: CPT | Mod: CPTII,S$GLB,, | Performed by: FAMILY MEDICINE

## 2025-06-17 PROCEDURE — 1159F MED LIST DOCD IN RCRD: CPT | Mod: CPTII,S$GLB,, | Performed by: FAMILY MEDICINE

## 2025-06-17 PROCEDURE — 3061F NEG MICROALBUMINURIA REV: CPT | Mod: CPTII,S$GLB,, | Performed by: FAMILY MEDICINE

## 2025-06-17 PROCEDURE — G2211 COMPLEX E/M VISIT ADD ON: HCPCS | Mod: S$GLB,,, | Performed by: FAMILY MEDICINE

## 2025-06-17 PROCEDURE — 3078F DIAST BP <80 MM HG: CPT | Mod: CPTII,S$GLB,, | Performed by: FAMILY MEDICINE

## 2025-06-17 PROCEDURE — 3008F BODY MASS INDEX DOCD: CPT | Mod: CPTII,S$GLB,, | Performed by: FAMILY MEDICINE

## 2025-06-17 PROCEDURE — 1101F PT FALLS ASSESS-DOCD LE1/YR: CPT | Mod: CPTII,S$GLB,, | Performed by: FAMILY MEDICINE

## 2025-06-17 PROCEDURE — 3075F SYST BP GE 130 - 139MM HG: CPT | Mod: CPTII,S$GLB,, | Performed by: FAMILY MEDICINE

## 2025-06-17 RX ORDER — CLOTRIMAZOLE AND BETAMETHASONE DIPROPIONATE 10; .64 MG/G; MG/G
CREAM TOPICAL 2 TIMES DAILY
Qty: 45 G | Refills: 1 | Status: SHIPPED | OUTPATIENT
Start: 2025-06-17

## 2025-06-17 RX ORDER — ATORVASTATIN CALCIUM 10 MG/1
10 TABLET, FILM COATED ORAL DAILY
Qty: 90 TABLET | Refills: 3 | Status: SHIPPED | OUTPATIENT
Start: 2025-06-17

## 2025-06-17 RX ORDER — CLOTRIMAZOLE 1 %
CREAM (GRAM) TOPICAL 2 TIMES DAILY
Qty: 60 G | Refills: 0 | Status: SHIPPED | OUTPATIENT
Start: 2025-06-17

## 2025-06-17 RX ORDER — METFORMIN HYDROCHLORIDE 1000 MG/1
1000 TABLET ORAL 2 TIMES DAILY WITH MEALS
Qty: 180 TABLET | Refills: 3 | Status: SHIPPED | OUTPATIENT
Start: 2025-06-17 | End: 2026-06-17

## 2025-06-17 NOTE — PROGRESS NOTES
Subjective:       Patient ID: Demario Live is a 69 y.o. male.    Chief Complaint: f/u on chronic issues.  HPI    Problem List[1]    Past Medical History:   Diagnosis Date    Acute deep vein thrombosis (DVT) of both lower extremities 4/17/2023    Acute pulmonary embolism 4/17/2023    Chronic kidney disease, stage 3a 4/17/2023    HLD (hyperlipidemia) 2/15/2019    Hypertension     Neuroendocrine carcinoma of pancreas 4/21/2021    Type 2 diabetes mellitus without complication, without long-term current use of insulin 4/17/2023       Past Surgical History:   Procedure Laterality Date    LIPOMA RESECTION      PROSTATE SURGERY  02/14/2023       Family History   Problem Relation Name Age of Onset    Stroke Mother      Heart disease Father      Celiac disease Neg Hx      Hemochromatosis Neg Hx         Tobacco Use History[2]    Wt Readings from Last 5 Encounters:   06/17/25 98 kg (216 lb)   03/12/25 99.1 kg (218 lb 7.6 oz)   12/19/24 99 kg (218 lb 4.1 oz)   12/05/24 98.9 kg (218 lb)   11/25/24 99.5 kg (219 lb 5.7 oz)       For further HPI details, see assessment and plan.  Doing well w no active complaints.  Review of Systems   Constitutional:  Negative for chills and fever.   HENT:  Negative for trouble swallowing.    Respiratory:  Negative for shortness of breath.    Cardiovascular:  Negative for chest pain.   Neurological:  Negative for dizziness.       Objective:      Vitals:    06/17/25 0800   BP: 136/76   Pulse: 106   Resp: 18   Temp: 98.2 °F (36.8 °C)       Physical Exam  Constitutional:       General: He is not in acute distress.     Appearance: He is not ill-appearing.   Pulmonary:      Effort: Pulmonary effort is normal. No respiratory distress.   Neurological:      General: No focal deficit present.      Mental Status: He is alert.   Psychiatric:         Mood and Affect: Mood normal.         Behavior: Behavior normal.         Assessment:       1. Neuroendocrine carcinoma of pancreas    2. Hyperlipidemia  associated with type 2 diabetes mellitus    3. Tinea pedis, unspecified laterality    4. Prostate cancer    5. Type 2 diabetes mellitus with hyperglycemia, without long-term current use of insulin    6. Hypertension associated with diabetes    7. Gout, unspecified cause, unspecified chronicity, unspecified site    8. Screening for colon cancer    9. Mild anemia    10. Folliculitis    11. Malignant neoplasm of pancreas, unspecified location of malignancy    12. Elevated TSH    13. Abnormal results of thyroid function studies        Plan:   Neuroendocrine carcinoma of pancreas    Patient working with the Hematology Oncology.  Further imaging and follow up as planned  Reviewed recent scan - some progression noted - has f/u planned to discussed with heme/onc next week per patient report. Discussed with patient and encouraged he keep f/u as planned.    Hyperlipidemia associated with type 2 diabetes mellitus  -     atorvastatin (LIPITOR) 10 MG tablet; Take 1 tablet (10 mg total) by mouth once daily.  Dispense: 90 tablet; Refill: 3    Cholesterol is well controlled.  Continue statin therapy.  Lipitor 10.  Tolerating    Tinea pedis, unspecified laterality  -     clotrimazole (LOTRIMIN) 1 % cream; Apply topically 2 (two) times daily.  Dispense: 60 g; Refill: 0  Clotrimazole has worked well.  Seems to have come about once again.  Continue medication topically    Prostate cancer  Patient has follow up with his urologist scheduled    Type 2 diabetes mellitus with hyperglycemia, without long-term current use of insulin  -     metFORMIN (GLUCOPHAGE) 1000 MG tablet; Take 1 tablet (1,000 mg total) by mouth 2 (two) times daily with meals.  Dispense: 180 tablet; Refill: 3      Not quite at goal.  Increase metformin from 500-1000 twice a day.  We will continue to monitor.  If need be we will add 2nd agent.  Continue lifestyle efforts    Hypertension associated with diabetes    Hypertension is well controlled.  Continue lisinopril 40  mg.  Monitor for any swelling of the lips the mouth or tongue.  If that ever occurs stop taking lisinopril and seek medical attention advise    Gout, unspecified cause, unspecified chronicity, unspecified site    No recent flare-ups.  On allopurinol 100 mg.  Uric acid controlled.  Continue medication.  He has colchicine just in case    Screening for colon cancer  -     Ambulatory referral/consult to Endo Procedure ; Future; Expected date: 06/17/2025    Patient's colonoscopy had to be canceled had to go out of town.  We will order again.  Expect phone call    Mild anemia    Chronic.  Stable.  We will continue to monitor.    Folliculitis  -     clotrimazole-betamethasone 1-0.05% (LOTRISONE) cream; Apply topically 2 (two) times daily.  Dispense: 45 g; Refill: 1  Folliculitis on his neck really improved with clotrimazole/betamethasone.  He wants to have it on hand just in case.  Refilled    Erectile dysfunction   Takes tolerates and benefits Viagra when needed.  Continue    Elevated TSH   T4 normal   We will continue to monitor with next round of lab    Immunization due:  Will get 2nd shingles shot.    Lab in 3 mon  See me after.      This note was verbally dictated, please excuse any type errors.         [1]   Patient Active Problem List  Diagnosis    Hypertension    HLD (hyperlipidemia)    Erectile dysfunction    Gout    Prostate cancer    Acute deep vein thrombosis (DVT) of both lower extremities    Type 2 diabetes mellitus without complication, without long-term current use of insulin    Malignant carcinoid tumors of other sites    Over weight    Malignant neoplasm of pancreas, unspecified location of malignancy    Hypertension associated with diabetes   [2]   Social History  Tobacco Use   Smoking Status Never    Passive exposure: Past   Smokeless Tobacco Never

## 2025-06-19 ENCOUNTER — OFFICE VISIT (OUTPATIENT)
Dept: OPHTHALMOLOGY | Facility: CLINIC | Age: 70
End: 2025-06-19
Payer: MEDICARE

## 2025-06-19 DIAGNOSIS — H25.13 NUCLEAR SCLEROSIS OF BOTH EYES: ICD-10-CM

## 2025-06-19 DIAGNOSIS — H40.023 OPEN ANGLE WITH BORDERLINE FINDINGS AND HIGH GLAUCOMA RISK IN BOTH EYES: Primary | ICD-10-CM

## 2025-06-19 DIAGNOSIS — E11.9 DIABETES MELLITUS TYPE 2 WITHOUT RETINOPATHY: ICD-10-CM

## 2025-06-19 PROCEDURE — 4010F ACE/ARB THERAPY RXD/TAKEN: CPT | Mod: CPTII,S$GLB,, | Performed by: OPHTHALMOLOGY

## 2025-06-19 PROCEDURE — 1160F RVW MEDS BY RX/DR IN RCRD: CPT | Mod: CPTII,S$GLB,, | Performed by: OPHTHALMOLOGY

## 2025-06-19 PROCEDURE — 1159F MED LIST DOCD IN RCRD: CPT | Mod: CPTII,S$GLB,, | Performed by: OPHTHALMOLOGY

## 2025-06-19 PROCEDURE — 3051F HG A1C>EQUAL 7.0%<8.0%: CPT | Mod: CPTII,S$GLB,, | Performed by: OPHTHALMOLOGY

## 2025-06-19 PROCEDURE — 3066F NEPHROPATHY DOC TX: CPT | Mod: CPTII,S$GLB,, | Performed by: OPHTHALMOLOGY

## 2025-06-19 PROCEDURE — 99213 OFFICE O/P EST LOW 20 MIN: CPT | Mod: S$GLB,,, | Performed by: OPHTHALMOLOGY

## 2025-06-19 PROCEDURE — 99999 PR PBB SHADOW E&M-EST. PATIENT-LVL I: CPT | Mod: PBBFAC,,, | Performed by: OPHTHALMOLOGY

## 2025-06-19 PROCEDURE — 2023F DILAT RTA XM W/O RTNOPTHY: CPT | Mod: CPTII,S$GLB,, | Performed by: OPHTHALMOLOGY

## 2025-06-19 PROCEDURE — 92133 CPTRZD OPH DX IMG PST SGM ON: CPT | Mod: S$GLB,,, | Performed by: OPHTHALMOLOGY

## 2025-06-19 PROCEDURE — 3061F NEG MICROALBUMINURIA REV: CPT | Mod: CPTII,S$GLB,, | Performed by: OPHTHALMOLOGY

## 2025-06-19 NOTE — PROGRESS NOTES
HPI     Glaucoma Suspect     Additional comments: Pt present for gOCT and DIL.            Comments    States that his vision is stable and denies any new issues at this time.           Last edited by Tamika Dockery on 6/19/2025  7:45 AM.            Assessment /Plan     For exam results, see Encounter Report.    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      Nuclear sclerosis of both eyes  Cataracts are present but not visually significant. Will continue to monitor.     Diabetes mellitus type 2 without retinopathy  Last A1c 7.8 . Diabetes controlled with no diabetic retinopathy on dilated exam. Reviewed diabetic eye precautions including excellent blood sugar control, and importance of regular follow up.       Return to clinic in 6 months for 24-2 HVF and IOP check or sooner PRN

## 2025-08-04 RX ORDER — LISINOPRIL 40 MG/1
40 TABLET ORAL DAILY
Qty: 90 TABLET | Refills: 3 | Status: SHIPPED | OUTPATIENT
Start: 2025-08-04 | End: 2026-08-04

## 2025-08-04 NOTE — TELEPHONE ENCOUNTER
No care due was identified.  [facility] Embedded Care Due Messages. Reference number: [id number].   8/04/2025 1:10:24 PM CDT

## 2025-08-20 ENCOUNTER — HOSPITAL ENCOUNTER (OUTPATIENT)
Dept: PREADMISSION TESTING | Facility: HOSPITAL | Age: 70
Discharge: HOME | End: 2025-08-20
Attending: FAMILY MEDICINE
Payer: MEDICARE